# Patient Record
Sex: MALE | Race: WHITE | NOT HISPANIC OR LATINO | Employment: FULL TIME | URBAN - METROPOLITAN AREA
[De-identification: names, ages, dates, MRNs, and addresses within clinical notes are randomized per-mention and may not be internally consistent; named-entity substitution may affect disease eponyms.]

---

## 2017-03-26 ENCOUNTER — HOSPITAL ENCOUNTER (EMERGENCY)
Facility: HOSPITAL | Age: 42
Discharge: HOME | End: 2017-03-26
Attending: EMERGENCY MEDICINE | Admitting: EMERGENCY MEDICINE

## 2017-03-26 VITALS
HEIGHT: 70 IN | DIASTOLIC BLOOD PRESSURE: 76 MMHG | OXYGEN SATURATION: 96 % | BODY MASS INDEX: 26.48 KG/M2 | RESPIRATION RATE: 24 BRPM | HEART RATE: 119 BPM | WEIGHT: 185 LBS | TEMPERATURE: 97.4 F | SYSTOLIC BLOOD PRESSURE: 122 MMHG

## 2017-03-26 DIAGNOSIS — M54.2 NECK PAIN: Primary | ICD-10-CM

## 2017-03-26 DIAGNOSIS — E10.8 TYPE 1 DIABETES MELLITUS WITH COMPLICATION (HCC): ICD-10-CM

## 2017-03-26 LAB — GLUCOSE BLDC GLUCOMTR-MCNC: 305 MG/DL (ref 70–130)

## 2017-03-26 PROCEDURE — 82962 GLUCOSE BLOOD TEST: CPT

## 2017-03-26 PROCEDURE — 99283 EMERGENCY DEPT VISIT LOW MDM: CPT

## 2017-03-27 NOTE — ED PROVIDER NOTES
"Subjective   History of Present Illness  This 42-year-old gentleman presents to the emergency department with complaints of headache and left neck and upper back pain.  This apparently is chronic and ongoing issue for this gentleman with frequent exacerbations.  The patient has been seen in October of this past year with similar complaints and at that time apparently became upset regarding the plan of care and left AMA.  He is also been seen a number of times at the [facility] with a similar complaints with several AMA discharges.  The patient's most recent visit to the [facility] was on the 22nd of this month at which time he admission was proposed due to uncontrolled diabetes and some degree of ketosis.  The patient left AMA once again.  A portion of the patient's chief complaint at presentation at that time was once again back and neck pain.  The patient has been seen at [facility] several times in the last several weeks with neck and back pain as well as sinus congestion a diagnosis of sinusitis and has been started on several medications.    Precipitant for his hyperglycemic issues Y's the initiation of a prednisone Dosepak by the urgent care center when he was seen the day prior to presenting to the emergency department at .  He is also continuing on an completing a course of Augmentin for \"sinusitis\".  Regardless the patient complains primarily of left sided neck pain for the last several days initially radiating into his shoulder and left upper extremity now not doing so.  He denies acute trauma or injury and indicates that this has been an ongoing issue.  Per review of records he has undergone imaging which is demonstrated no acute disease requiring surgical entered mention he has apparently in the past had physical therapy without significant response and has been referred to pain treatment center with, at least in the past, report of difficulty in arranging an appointment.  " "The patient this evening tells me primarily that he simply feels unwell and he is having neck and back pain as well as a headache he tells me that his sugars were elevated and that \"no doctor in this state seems to be able to tell me what's wrong with me\".     Past medical history is significant for a history of diabetes he tells me that he utilizes Lantus twice daily at a 10 unit dose each time.  He is apparently not on a short acting agent.  Additionally he appears to be on Januvia as well as metformin.  Other medications are reviewed and are noted on the chart.    Social history he does not smoke he does not drink or utilize drugs    Of systems he denies fever or chills he denies nausea vomiting or diarrhea he denies cough cold or runny nose he denies sore throat he denies visual changes she denies loss of bowel or bladder control  Review of Systems   Constitutional: Negative for chills and fever.   HENT: Negative for sore throat and trouble swallowing.    Eyes: Negative for visual disturbance.   Respiratory: Negative for cough.    Gastrointestinal: Negative for abdominal pain, diarrhea, nausea and vomiting.   Genitourinary: Negative for dysuria, frequency and urgency.   Musculoskeletal: Positive for back pain.   All other systems reviewed and are negative.      Past Medical History:   Diagnosis Date   • Diabetes mellitus        No Known Allergies    History reviewed. No pertinent surgical history.    Family History   Problem Relation Age of Onset   • Diabetes Father    • Stroke Father    • Kidney failure Father        Social History     Social History   • Marital status:      Spouse name: N/A   • Number of children: N/A   • Years of education: N/A     Social History Main Topics   • Smoking status: Never Smoker   • Smokeless tobacco: None   • Alcohol use No   • Drug use: No   • Sexual activity: Defer     Other Topics Concern   • None     Social History Narrative           Objective   Physical Exam "   Constitutional: He is oriented to person, place, and time. He appears well-developed and well-nourished. No distress.   HENT:   Head: Normocephalic and atraumatic.   Mouth/Throat: Oropharynx is clear and moist.   Eyes: Pupils are equal, round, and reactive to light. No scleral icterus.   Cardiovascular: Normal rate and regular rhythm.    No murmur heard.  Pulmonary/Chest: Effort normal and breath sounds normal. No respiratory distress. He has no wheezes. He has no rales.   Abdominal: Soft. Bowel sounds are normal. He exhibits no distension. There is no tenderness.   Musculoskeletal: He exhibits no edema.   Neurological: He is alert and oriented to person, place, and time. No cranial nerve deficit. Coordination normal.   Skin: Skin is warm and dry. He is not diaphoretic.   Psychiatric: He has a normal mood and affect. His behavior is normal.   Nursing note and vitals reviewed.   the patient's speech and mentation are normal he appears in no acute distress has noted his cardiac exam shows a normal rate counted at 80 by myself on examination (an initial pulse was recorded as 119 in triage) there is mild diffuse tenderness to the left neck however range of motion is normal there is no crepitus with movement pulses are intact in the upper extremities bilaterally they are normal they are symmetric deep tendon reflexes are intact in the upper extremity bilaterally    The patient further tells me that he is apparently was told at  at some point that he required a transfusion was anemic.    At the conclusion of my examination I spoke with the patient about a proposed course of evaluation which would include blood work to confirm an elevated blood glucose as well as to confirm an anemia.  I told him that we would check for ketones as he apparently did have some previous ketosis.  I told him that we would provide him with pain medication and muscle relaxers for his neck pain as well as perform a rectal examination to check  "for occult blood (he denies melena hematochezia or hematemesis)..  At that point he expressed frustration that \"no doctor could do anything for him\".  I told him that our primary function here was not necessarily to provide in depth and detail diagnosis but rather to address more acute issues as they appeared and to attempt to make either appropriate referrals or initiate appropriate treatment as an inpatient.  I further told him that we would provide him a short acting insulin should his glucoses be elevated to that point on serum evaluation.  He replied that\" we were going to do anything\" that he would leave this place.  He asked that I prepare his paperwork and he be allowed to be to leave.  The patient is noted to be hemodynamically stable he is afebrile his exam demonstrates no evidence of any acute life-threatening illness and I will comply with the patient's request that we discharge him.  He has adamantly refused to allow me to draw blood or perform any other diagnostic evaluation.  He tells me that he is very familiar with diabetes and with ketosis and that he does not feel that that is currently a problem and once again declines any further workup.  I have told him that I will evaluate him for his anemia as well he again has declined.  He appears to be aware of his surroundings to understand the implication of declining evaluation and to have normal mental status he appears to be competent to accept or decline any proposed treatment.  It asked further that he follow-up with his physician he is currently seeing a Dr. Saenz [facility] has done so several times over the past several weeks    Procedures         ED Course  ED Course                  MDM    Final diagnoses:   Neck pain   Type 1 diabetes mellitus with complication            Reynaldo Haley MD  03/28/17 0643    "

## 2017-10-19 ENCOUNTER — OFFICE VISIT (OUTPATIENT)
Dept: NEUROSURGERY | Facility: CLINIC | Age: 42
End: 2017-10-19

## 2017-10-19 VITALS — WEIGHT: 190 LBS | HEIGHT: 69 IN | TEMPERATURE: 97 F | BODY MASS INDEX: 28.14 KG/M2

## 2017-10-19 DIAGNOSIS — M51.34 DDD (DEGENERATIVE DISC DISEASE), THORACIC: Primary | ICD-10-CM

## 2017-10-19 DIAGNOSIS — M51.36 DDD (DEGENERATIVE DISC DISEASE), LUMBAR: ICD-10-CM

## 2017-10-19 DIAGNOSIS — M50.30 DDD (DEGENERATIVE DISC DISEASE), CERVICAL: ICD-10-CM

## 2017-10-19 PROCEDURE — 99243 OFF/OP CNSLTJ NEW/EST LOW 30: CPT | Performed by: NEUROLOGICAL SURGERY

## 2017-10-19 NOTE — PROGRESS NOTES
Subjective   Patient ID: Joshua Power is a 42 y.o. male is being seen for consultation today at the request of Riky Saenz MD  Chief Complaint: Diffuse pain    History of Present Illness: The patient is a 42-year-old man who has a history of progressive pain that began in his low to mid back and has progressively involved his upper back and neck and now his head.  He has developed such a debilitating pain in his description that he has had to stop employment working at a computer because simply sitting and working is painful to him.  He had MRI of his thoracic and cervical spine last year, and again this year, and a diagnosis of DISH (disseminated idiopathic skeletal hyperostosis.  A new MRI scan of his grass neck and cervical spine were done and he was referred for neurosurgical evaluation from rheumatology.    Review of Radiographic Studies:  Cervical MRI scan shows degenerative disc change of moderate degree at C5-6.  Thoracic MRI scan shows multiple anterior osteophytes throughout out the thoracic spine without significant degenerative instability.  There is no canal stenosis, no subluxation, and no evidence of nerve root compression.    The following portions of the patient's history were reviewed, updated as appropriate and approved: allergies, current medications, past family history, past medical history, past social history, past surgical history, review of systems and problem list.  Review of Systems   Constitutional: Positive for activity change, appetite change, chills, diaphoresis, fatigue and unexpected weight change. Negative for fever.   HENT: Positive for congestion, dental problem, ear pain, facial swelling, sinus pressure, sore throat and trouble swallowing. Negative for drooling, ear discharge, hearing loss, mouth sores, nosebleeds, postnasal drip, rhinorrhea, sneezing, tinnitus and voice change.    Eyes: Positive for photophobia. Negative for pain, discharge, redness, itching and  visual disturbance.   Respiratory: Positive for apnea and shortness of breath. Negative for cough, choking, chest tightness, wheezing and stridor.    Cardiovascular: Negative for chest pain, palpitations and leg swelling.   Gastrointestinal: Negative for abdominal distention, abdominal pain, anal bleeding, blood in stool, constipation, diarrhea, nausea, rectal pain and vomiting.   Endocrine: Negative for cold intolerance, heat intolerance, polydipsia, polyphagia and polyuria.   Genitourinary: Positive for flank pain. Negative for decreased urine volume, difficulty urinating, dysuria, enuresis, frequency, genital sores, hematuria and urgency.   Musculoskeletal: Positive for arthralgias, gait problem, joint swelling, myalgias, neck pain and neck stiffness. Negative for back pain.   Skin: Negative for color change, pallor, rash and wound.   Allergic/Immunologic: Negative for environmental allergies, food allergies and immunocompromised state.   Neurological: Positive for dizziness, tremors, weakness, light-headedness, numbness and headaches. Negative for seizures, syncope, facial asymmetry and speech difficulty.   Hematological: Negative for adenopathy. Does not bruise/bleed easily.   Psychiatric/Behavioral: Positive for confusion, dysphoric mood and sleep disturbance. Negative for agitation, behavioral problems, decreased concentration, hallucinations, self-injury and suicidal ideas. The patient is not nervous/anxious and is not hyperactive.    All other systems reviewed and are negative.      Objective     NEUROLOGICAL EXAMINATION:      MENTAL STATUS:  Alert and oriented.  Speech intact.  Recent and remote memory intact.      CRANIAL NERVES:    Pupils are equal and reactive to light.  Extraocular movements are conjugate.  There is no facial asymmetry.  Hearing appears to be intact.    MUSCULOSKELETAL:  SLR negative. Jorge's test negative.    MOTOR:  Deltoid, biceps, triceps, and  strength intact.  No hand  atrophy.  Hip flexion, knee extension, ankle dorsiflexion and plantar flexion intact. No tremor, spasticity, ataxia, or dysmetria.    SENSATION: Intact to touch upper and lower extremities.  Position sense intact.    REFLEXES:  DTR Intact and symmetrical in upper and lower extremities. Negative Babinski bilaterally    Assessment   Disseminated spinal pain.  A possible diagnosis of disseminated idiopathic skeletal hyperostosis based on anterior osteophytes in the thoracic region is a consideration, but it does not have the appearance of a full-blown syndrome.  I cannot specifically diagnose the cause of his pain.       Plan   Recommend pain management since there is no neurosurgical treatment for this spinal problem.  He says he had a referral may but it was denied by his insurance carrier.  He became quite agitated and left suddenly.       Riky Fraser MD

## 2019-03-17 ENCOUNTER — APPOINTMENT (OUTPATIENT)
Dept: CT IMAGING | Facility: HOSPITAL | Age: 44
End: 2019-03-17

## 2019-03-17 ENCOUNTER — HOSPITAL ENCOUNTER (INPATIENT)
Facility: HOSPITAL | Age: 44
LOS: 2 days | Discharge: HOME | End: 2019-03-20
Attending: EMERGENCY MEDICINE | Admitting: HOSPITALIST

## 2019-03-17 DIAGNOSIS — K57.80 PERFORATED DIVERTICULUM: Primary | ICD-10-CM

## 2019-03-17 LAB
ALBUMIN SERPL-MCNC: 5.04 G/DL (ref 3.2–4.8)
ALBUMIN/GLOB SERPL: 1.8 G/DL (ref 1.5–2.5)
ALP SERPL-CCNC: 47 U/L (ref 25–100)
ALT SERPL W P-5'-P-CCNC: 53 U/L (ref 7–40)
ANION GAP SERPL CALCULATED.3IONS-SCNC: 11 MMOL/L (ref 3–11)
AST SERPL-CCNC: 137 U/L (ref 0–33)
BASOPHILS # BLD AUTO: 0.01 10*3/MM3 (ref 0–0.2)
BASOPHILS NFR BLD AUTO: 0.1 % (ref 0–1)
BILIRUB SERPL-MCNC: 0.5 MG/DL (ref 0.3–1.2)
BUN BLD-MCNC: 15 MG/DL (ref 9–23)
BUN/CREAT SERPL: 13.3 (ref 7–25)
CALCIUM SPEC-SCNC: 8.8 MG/DL (ref 8.7–10.4)
CHLORIDE SERPL-SCNC: 98 MMOL/L (ref 99–109)
CO2 SERPL-SCNC: 23 MMOL/L (ref 20–31)
CREAT BLD-MCNC: 1.13 MG/DL (ref 0.6–1.3)
DEPRECATED RDW RBC AUTO: 47.7 FL (ref 37–54)
EOSINOPHIL # BLD AUTO: 0.05 10*3/MM3 (ref 0–0.3)
EOSINOPHIL NFR BLD AUTO: 0.4 % (ref 0–3)
ERYTHROCYTE [DISTWIDTH] IN BLOOD BY AUTOMATED COUNT: 13.8 % (ref 11.3–14.5)
GFR SERPL CREATININE-BSD FRML MDRD: 70 ML/MIN/1.73
GLOBULIN UR ELPH-MCNC: 2.8 GM/DL
GLUCOSE BLD-MCNC: 253 MG/DL (ref 70–100)
HCT VFR BLD AUTO: 44.6 % (ref 38.9–50.9)
HGB BLD-MCNC: 15.6 G/DL (ref 13.1–17.5)
IMM GRANULOCYTES # BLD AUTO: 0.08 10*3/MM3 (ref 0–0.05)
IMM GRANULOCYTES NFR BLD AUTO: 0.6 % (ref 0–0.6)
LIPASE SERPL-CCNC: 63 U/L (ref 6–51)
LYMPHOCYTES # BLD AUTO: 3.1 10*3/MM3 (ref 0.6–4.8)
LYMPHOCYTES NFR BLD AUTO: 22 % (ref 24–44)
MCH RBC QN AUTO: 33.2 PG (ref 27–31)
MCHC RBC AUTO-ENTMCNC: 35 G/DL (ref 32–36)
MCV RBC AUTO: 94.9 FL (ref 80–99)
MONOCYTES # BLD AUTO: 0.77 10*3/MM3 (ref 0–1)
MONOCYTES NFR BLD AUTO: 5.5 % (ref 0–12)
NEUTROPHILS # BLD AUTO: 10.16 10*3/MM3 (ref 1.5–8.3)
NEUTROPHILS NFR BLD AUTO: 72 % (ref 41–71)
PLATELET # BLD AUTO: 179 10*3/MM3 (ref 150–450)
PMV BLD AUTO: 10.8 FL (ref 6–12)
POTASSIUM BLD-SCNC: 5.5 MMOL/L (ref 3.5–5.5)
PROT SERPL-MCNC: 7.8 G/DL (ref 5.7–8.2)
RBC # BLD AUTO: 4.7 10*6/MM3 (ref 4.2–5.76)
SODIUM BLD-SCNC: 132 MMOL/L (ref 132–146)
WBC NRBC COR # BLD: 14.09 10*3/MM3 (ref 3.5–10.8)

## 2019-03-17 PROCEDURE — 80053 COMPREHEN METABOLIC PANEL: CPT | Performed by: NURSE PRACTITIONER

## 2019-03-17 PROCEDURE — 87040 BLOOD CULTURE FOR BACTERIA: CPT | Performed by: NURSE PRACTITIONER

## 2019-03-17 PROCEDURE — 25010000002 ONDANSETRON PER 1 MG: Performed by: NURSE PRACTITIONER

## 2019-03-17 PROCEDURE — 83605 ASSAY OF LACTIC ACID: CPT | Performed by: NURSE PRACTITIONER

## 2019-03-17 PROCEDURE — 83690 ASSAY OF LIPASE: CPT | Performed by: NURSE PRACTITIONER

## 2019-03-17 PROCEDURE — 81003 URINALYSIS AUTO W/O SCOPE: CPT | Performed by: NURSE PRACTITIONER

## 2019-03-17 PROCEDURE — 74177 CT ABD & PELVIS W/CONTRAST: CPT

## 2019-03-17 PROCEDURE — 85025 COMPLETE CBC W/AUTO DIFF WBC: CPT | Performed by: NURSE PRACTITIONER

## 2019-03-17 PROCEDURE — 25010000002 MORPHINE PER 10 MG: Performed by: EMERGENCY MEDICINE

## 2019-03-17 PROCEDURE — 25010000002 HYDROMORPHONE 1 MG/ML SOLUTION: Performed by: EMERGENCY MEDICINE

## 2019-03-17 PROCEDURE — 36415 COLL VENOUS BLD VENIPUNCTURE: CPT

## 2019-03-17 PROCEDURE — 84145 PROCALCITONIN (PCT): CPT | Performed by: NURSE PRACTITIONER

## 2019-03-17 PROCEDURE — 25010000002 IOPAMIDOL 61 % SOLUTION: Performed by: EMERGENCY MEDICINE

## 2019-03-17 PROCEDURE — 99285 EMERGENCY DEPT VISIT HI MDM: CPT

## 2019-03-17 RX ORDER — ONDANSETRON 2 MG/ML
4 INJECTION INTRAMUSCULAR; INTRAVENOUS ONCE
Status: COMPLETED | OUTPATIENT
Start: 2019-03-17 | End: 2019-03-17

## 2019-03-17 RX ORDER — TRAZODONE HYDROCHLORIDE 150 MG/1
50 TABLET ORAL NIGHTLY
COMMUNITY
End: 2022-03-01 | Stop reason: ALTCHOICE

## 2019-03-17 RX ORDER — PROPRANOLOL HYDROCHLORIDE 10 MG/1
10 TABLET ORAL DAILY
COMMUNITY
End: 2022-03-01

## 2019-03-17 RX ORDER — CLONAZEPAM 0.5 MG/1
0.5 TABLET ORAL
COMMUNITY

## 2019-03-17 RX ORDER — MORPHINE SULFATE 4 MG/ML
4 INJECTION, SOLUTION INTRAMUSCULAR; INTRAVENOUS ONCE
Status: COMPLETED | OUTPATIENT
Start: 2019-03-17 | End: 2019-03-17

## 2019-03-17 RX ORDER — SODIUM CHLORIDE 0.9 % (FLUSH) 0.9 %
10 SYRINGE (ML) INJECTION AS NEEDED
Status: DISCONTINUED | OUTPATIENT
Start: 2019-03-17 | End: 2019-03-20 | Stop reason: HOSPADM

## 2019-03-17 RX ADMIN — HYDROMORPHONE HYDROCHLORIDE 1 MG: 1 INJECTION, SOLUTION INTRAMUSCULAR; INTRAVENOUS; SUBCUTANEOUS at 23:22

## 2019-03-17 RX ADMIN — ONDANSETRON 4 MG: 2 INJECTION INTRAMUSCULAR; INTRAVENOUS at 20:47

## 2019-03-17 RX ADMIN — IOPAMIDOL 100 ML: 612 INJECTION, SOLUTION INTRAVENOUS at 21:51

## 2019-03-17 RX ADMIN — MORPHINE SULFATE 4 MG: 4 INJECTION INTRAVENOUS at 20:57

## 2019-03-17 RX ADMIN — SODIUM CHLORIDE 1000 ML: 9 INJECTION, SOLUTION INTRAVENOUS at 20:47

## 2019-03-18 PROBLEM — E86.0 DEHYDRATION: Status: ACTIVE | Noted: 2019-03-18

## 2019-03-18 PROBLEM — F32.A DEPRESSION: Status: ACTIVE | Noted: 2019-03-18

## 2019-03-18 PROBLEM — I10 HTN (HYPERTENSION): Status: ACTIVE | Noted: 2019-03-18

## 2019-03-18 PROBLEM — K57.80 PERFORATED DIVERTICULUM: Status: ACTIVE | Noted: 2019-03-18

## 2019-03-18 PROBLEM — D64.9 ANEMIA: Status: ACTIVE | Noted: 2019-03-18

## 2019-03-18 PROBLEM — E11.9 TYPE 2 DIABETES MELLITUS: Status: ACTIVE | Noted: 2019-03-18

## 2019-03-18 PROBLEM — E87.20 LACTIC ACIDOSIS: Status: ACTIVE | Noted: 2019-03-18

## 2019-03-18 PROBLEM — G47.33 OSA (OBSTRUCTIVE SLEEP APNEA): Status: ACTIVE | Noted: 2019-03-18

## 2019-03-18 PROBLEM — G89.4 CHRONIC PAIN SYNDROME: Status: ACTIVE | Noted: 2019-03-18

## 2019-03-18 PROBLEM — K57.20 DIVERTICULITIS OF COLON WITH PERFORATION: Status: ACTIVE | Noted: 2019-03-18

## 2019-03-18 LAB
ANION GAP SERPL CALCULATED.3IONS-SCNC: 9 MMOL/L (ref 3–11)
BASOPHILS # BLD AUTO: 0.01 10*3/MM3 (ref 0–0.2)
BASOPHILS NFR BLD AUTO: 0.1 % (ref 0–1)
BILIRUB UR QL STRIP: NEGATIVE
BUN BLD-MCNC: 14 MG/DL (ref 9–23)
BUN/CREAT SERPL: 14.4 (ref 7–25)
CALCIUM SPEC-SCNC: 8.1 MG/DL (ref 8.7–10.4)
CHLORIDE SERPL-SCNC: 104 MMOL/L (ref 99–109)
CLARITY UR: CLEAR
CO2 SERPL-SCNC: 26 MMOL/L (ref 20–31)
COLOR UR: YELLOW
CREAT BLD-MCNC: 0.97 MG/DL (ref 0.6–1.3)
D-LACTATE SERPL-SCNC: 1.5 MMOL/L (ref 0.5–2)
D-LACTATE SERPL-SCNC: 3.2 MMOL/L (ref 0.5–2)
DEPRECATED RDW RBC AUTO: 48.7 FL (ref 37–54)
EOSINOPHIL # BLD AUTO: 0.03 10*3/MM3 (ref 0–0.3)
EOSINOPHIL NFR BLD AUTO: 0.2 % (ref 0–3)
ERYTHROCYTE [DISTWIDTH] IN BLOOD BY AUTOMATED COUNT: 13.9 % (ref 11.3–14.5)
GFR SERPL CREATININE-BSD FRML MDRD: 84 ML/MIN/1.73
GLUCOSE BLD-MCNC: 153 MG/DL (ref 70–100)
GLUCOSE BLDC GLUCOMTR-MCNC: 101 MG/DL (ref 70–130)
GLUCOSE BLDC GLUCOMTR-MCNC: 114 MG/DL (ref 70–130)
GLUCOSE BLDC GLUCOMTR-MCNC: 187 MG/DL (ref 70–130)
GLUCOSE BLDC GLUCOMTR-MCNC: 200 MG/DL (ref 70–130)
GLUCOSE UR STRIP-MCNC: ABNORMAL MG/DL
HBA1C MFR BLD: 8.8 % (ref 4.8–5.6)
HCT VFR BLD AUTO: 37.9 % (ref 38.9–50.9)
HGB BLD-MCNC: 12.8 G/DL (ref 13.1–17.5)
HGB UR QL STRIP.AUTO: NEGATIVE
HOLD SPECIMEN: NORMAL
IMM GRANULOCYTES # BLD AUTO: 0.06 10*3/MM3 (ref 0–0.05)
IMM GRANULOCYTES NFR BLD AUTO: 0.4 % (ref 0–0.6)
KETONES UR QL STRIP: NEGATIVE
LEUKOCYTE ESTERASE UR QL STRIP.AUTO: NEGATIVE
LYMPHOCYTES # BLD AUTO: 2.58 10*3/MM3 (ref 0.6–4.8)
LYMPHOCYTES NFR BLD AUTO: 16.8 % (ref 24–44)
MCH RBC QN AUTO: 32.5 PG (ref 27–31)
MCHC RBC AUTO-ENTMCNC: 33.8 G/DL (ref 32–36)
MCV RBC AUTO: 96.2 FL (ref 80–99)
MONOCYTES # BLD AUTO: 1.14 10*3/MM3 (ref 0–1)
MONOCYTES NFR BLD AUTO: 7.4 % (ref 0–12)
NEUTROPHILS # BLD AUTO: 11.58 10*3/MM3 (ref 1.5–8.3)
NEUTROPHILS NFR BLD AUTO: 75.5 % (ref 41–71)
NITRITE UR QL STRIP: NEGATIVE
PH UR STRIP.AUTO: 6.5 [PH] (ref 5–8)
PLATELET # BLD AUTO: 130 10*3/MM3 (ref 150–450)
PMV BLD AUTO: 10.2 FL (ref 6–12)
POTASSIUM BLD-SCNC: 3.7 MMOL/L (ref 3.5–5.5)
PROCALCITONIN SERPL-MCNC: <0.05 NG/ML
PROT UR QL STRIP: NEGATIVE
RBC # BLD AUTO: 3.94 10*6/MM3 (ref 4.2–5.76)
SODIUM BLD-SCNC: 139 MMOL/L (ref 132–146)
SP GR UR STRIP: 1.03 (ref 1–1.03)
UROBILINOGEN UR QL STRIP: ABNORMAL
WBC NRBC COR # BLD: 15.34 10*3/MM3 (ref 3.5–10.8)

## 2019-03-18 PROCEDURE — 80048 BASIC METABOLIC PNL TOTAL CA: CPT | Performed by: HOSPITALIST

## 2019-03-18 PROCEDURE — 25010000002 HYDROMORPHONE 1 MG/ML SOLUTION: Performed by: INTERNAL MEDICINE

## 2019-03-18 PROCEDURE — 94799 UNLISTED PULMONARY SVC/PX: CPT

## 2019-03-18 PROCEDURE — 99223 1ST HOSP IP/OBS HIGH 75: CPT | Performed by: HOSPITALIST

## 2019-03-18 PROCEDURE — 63710000001 INSULIN LISPRO (HUMAN) PER 5 UNITS: Performed by: PHYSICIAN ASSISTANT

## 2019-03-18 PROCEDURE — C1894 INTRO/SHEATH, NON-LASER: HCPCS

## 2019-03-18 PROCEDURE — 25010000002 HYDROMORPHONE PER 4 MG: Performed by: HOSPITALIST

## 2019-03-18 PROCEDURE — 25010000002 PIPERACILLIN SOD-TAZOBACTAM PER 1 G: Performed by: HOSPITALIST

## 2019-03-18 PROCEDURE — 25010000002 PIPERACILLIN SOD-TAZOBACTAM PER 1 G: Performed by: NURSE PRACTITIONER

## 2019-03-18 PROCEDURE — 25010000002 LORAZEPAM PER 2 MG: Performed by: HOSPITALIST

## 2019-03-18 PROCEDURE — 02HV33Z INSERTION OF INFUSION DEVICE INTO SUPERIOR VENA CAVA, PERCUTANEOUS APPROACH: ICD-10-PCS | Performed by: HOSPITALIST

## 2019-03-18 PROCEDURE — 63710000001 INSULIN REGULAR HUMAN PER 5 UNITS: Performed by: NURSE PRACTITIONER

## 2019-03-18 PROCEDURE — 82962 GLUCOSE BLOOD TEST: CPT

## 2019-03-18 PROCEDURE — 63710000001 INSULIN DETEMIR PER 5 UNITS: Performed by: HOSPITALIST

## 2019-03-18 PROCEDURE — 94660 CPAP INITIATION&MGMT: CPT

## 2019-03-18 PROCEDURE — 85025 COMPLETE CBC W/AUTO DIFF WBC: CPT | Performed by: HOSPITALIST

## 2019-03-18 PROCEDURE — 83605 ASSAY OF LACTIC ACID: CPT | Performed by: NURSE PRACTITIONER

## 2019-03-18 PROCEDURE — 83036 HEMOGLOBIN GLYCOSYLATED A1C: CPT | Performed by: HOSPITALIST

## 2019-03-18 PROCEDURE — C1751 CATH, INF, PER/CENT/MIDLINE: HCPCS

## 2019-03-18 RX ORDER — LORAZEPAM 2 MG/ML
0.5 INJECTION INTRAMUSCULAR EVERY 12 HOURS PRN
Status: DISCONTINUED | OUTPATIENT
Start: 2019-03-18 | End: 2019-03-20 | Stop reason: HOSPADM

## 2019-03-18 RX ORDER — HYDROCODONE BITARTRATE AND ACETAMINOPHEN 5; 325 MG/1; MG/1
1 TABLET ORAL EVERY 6 HOURS PRN
Status: DISCONTINUED | OUTPATIENT
Start: 2019-03-18 | End: 2019-03-19

## 2019-03-18 RX ORDER — DULOXETIN HYDROCHLORIDE 60 MG/1
60 CAPSULE, DELAYED RELEASE ORAL 2 TIMES DAILY
Status: DISCONTINUED | OUTPATIENT
Start: 2019-03-18 | End: 2019-03-20 | Stop reason: HOSPADM

## 2019-03-18 RX ORDER — SODIUM CHLORIDE 9 MG/ML
125 INJECTION, SOLUTION INTRAVENOUS CONTINUOUS
Status: DISCONTINUED | OUTPATIENT
Start: 2019-03-18 | End: 2019-03-20 | Stop reason: HOSPADM

## 2019-03-18 RX ORDER — DIVALPROEX SODIUM 500 MG/1
500 TABLET, DELAYED RELEASE ORAL EVERY 12 HOURS SCHEDULED
Status: CANCELLED | OUTPATIENT
Start: 2019-03-17

## 2019-03-18 RX ORDER — PROPRANOLOL HYDROCHLORIDE 10 MG/1
10 TABLET ORAL DAILY
Status: DISCONTINUED | OUTPATIENT
Start: 2019-03-19 | End: 2019-03-20 | Stop reason: HOSPADM

## 2019-03-18 RX ORDER — FERROUS SULFATE 325(65) MG
325 TABLET ORAL
Status: CANCELLED | OUTPATIENT
Start: 2019-03-18

## 2019-03-18 RX ORDER — SODIUM CHLORIDE 0.9 % (FLUSH) 0.9 %
20 SYRINGE (ML) INJECTION AS NEEDED
Status: DISCONTINUED | OUTPATIENT
Start: 2019-03-18 | End: 2019-03-20 | Stop reason: HOSPADM

## 2019-03-18 RX ORDER — NICOTINE POLACRILEX 4 MG
15 LOZENGE BUCCAL
Status: DISCONTINUED | OUTPATIENT
Start: 2019-03-18 | End: 2019-03-20 | Stop reason: HOSPADM

## 2019-03-18 RX ORDER — PROPRANOLOL HYDROCHLORIDE 10 MG/1
10 TABLET ORAL DAILY
Status: CANCELLED | OUTPATIENT
Start: 2019-03-18

## 2019-03-18 RX ORDER — TRAZODONE HYDROCHLORIDE 50 MG/1
75 TABLET ORAL NIGHTLY
Status: DISCONTINUED | OUTPATIENT
Start: 2019-03-18 | End: 2019-03-19

## 2019-03-18 RX ORDER — SODIUM CHLORIDE 0.9 % (FLUSH) 0.9 %
10 SYRINGE (ML) INJECTION EVERY 12 HOURS SCHEDULED
Status: DISCONTINUED | OUTPATIENT
Start: 2019-03-18 | End: 2019-03-20 | Stop reason: HOSPADM

## 2019-03-18 RX ORDER — SODIUM CHLORIDE 0.9 % (FLUSH) 0.9 %
10 SYRINGE (ML) INJECTION AS NEEDED
Status: DISCONTINUED | OUTPATIENT
Start: 2019-03-18 | End: 2019-03-20 | Stop reason: HOSPADM

## 2019-03-18 RX ORDER — CLONAZEPAM 0.5 MG/1
0.5 TABLET ORAL 2 TIMES DAILY
Status: CANCELLED | OUTPATIENT
Start: 2019-03-17

## 2019-03-18 RX ORDER — HYDROMORPHONE HYDROCHLORIDE 1 MG/ML
0.5 INJECTION, SOLUTION INTRAMUSCULAR; INTRAVENOUS; SUBCUTANEOUS
Status: DISCONTINUED | OUTPATIENT
Start: 2019-03-18 | End: 2019-03-18

## 2019-03-18 RX ORDER — CLONAZEPAM 0.5 MG/1
0.5 TABLET ORAL 2 TIMES DAILY
Status: DISCONTINUED | OUTPATIENT
Start: 2019-03-18 | End: 2019-03-20 | Stop reason: HOSPADM

## 2019-03-18 RX ORDER — DEXTROSE MONOHYDRATE 25 G/50ML
25 INJECTION, SOLUTION INTRAVENOUS
Status: DISCONTINUED | OUTPATIENT
Start: 2019-03-18 | End: 2019-03-20 | Stop reason: HOSPADM

## 2019-03-18 RX ORDER — DIVALPROEX SODIUM 500 MG/1
500 TABLET, DELAYED RELEASE ORAL EVERY 12 HOURS SCHEDULED
Status: DISCONTINUED | OUTPATIENT
Start: 2019-03-18 | End: 2019-03-20 | Stop reason: HOSPADM

## 2019-03-18 RX ORDER — DULOXETIN HYDROCHLORIDE 60 MG/1
60 CAPSULE, DELAYED RELEASE ORAL 2 TIMES DAILY
Status: CANCELLED | OUTPATIENT
Start: 2019-03-17

## 2019-03-18 RX ORDER — FAMOTIDINE 20 MG/1
20 TABLET, FILM COATED ORAL 2 TIMES DAILY
Status: DISCONTINUED | OUTPATIENT
Start: 2019-03-18 | End: 2019-03-20 | Stop reason: HOSPADM

## 2019-03-18 RX ADMIN — TAZOBACTAM SODIUM AND PIPERACILLIN SODIUM 3.38 G: 375; 3 INJECTION, SOLUTION INTRAVENOUS at 18:18

## 2019-03-18 RX ADMIN — LORAZEPAM 0.5 MG: 2 INJECTION INTRAMUSCULAR; INTRAVENOUS at 03:28

## 2019-03-18 RX ADMIN — SODIUM CHLORIDE 125 ML/HR: 9 INJECTION, SOLUTION INTRAVENOUS at 12:35

## 2019-03-18 RX ADMIN — LORAZEPAM 0.5 MG: 2 INJECTION INTRAMUSCULAR; INTRAVENOUS at 17:32

## 2019-03-18 RX ADMIN — FAMOTIDINE 20 MG: 20 TABLET ORAL at 20:32

## 2019-03-18 RX ADMIN — HYDROCODONE BITARTRATE AND ACETAMINOPHEN 1 TABLET: 5; 325 TABLET ORAL at 22:40

## 2019-03-18 RX ADMIN — HYDROMORPHONE HYDROCHLORIDE 0.5 MG: 1 INJECTION, SOLUTION INTRAMUSCULAR; INTRAVENOUS; SUBCUTANEOUS at 12:46

## 2019-03-18 RX ADMIN — TAZOBACTAM SODIUM AND PIPERACILLIN SODIUM 3.38 G: 375; 3 INJECTION, SOLUTION INTRAVENOUS at 12:02

## 2019-03-18 RX ADMIN — HYDROMORPHONE HYDROCHLORIDE 0.5 MG: 1 INJECTION, SOLUTION INTRAMUSCULAR; INTRAVENOUS; SUBCUTANEOUS at 09:03

## 2019-03-18 RX ADMIN — TAZOBACTAM SODIUM AND PIPERACILLIN SODIUM 4.5 G: 500; 4 INJECTION, SOLUTION INTRAVENOUS at 00:02

## 2019-03-18 RX ADMIN — DIVALPROEX SODIUM 500 MG: 500 TABLET, DELAYED RELEASE ORAL at 20:33

## 2019-03-18 RX ADMIN — HYDROMORPHONE HYDROCHLORIDE 0.5 MG: 1 INJECTION, SOLUTION INTRAMUSCULAR; INTRAVENOUS; SUBCUTANEOUS at 05:50

## 2019-03-18 RX ADMIN — HYDROMORPHONE HYDROCHLORIDE 1 MG: 1 INJECTION, SOLUTION INTRAMUSCULAR; INTRAVENOUS; SUBCUTANEOUS at 14:21

## 2019-03-18 RX ADMIN — METHYLNALTREXONE BROMIDE 6 MG: 12 INJECTION, SOLUTION SUBCUTANEOUS at 16:52

## 2019-03-18 RX ADMIN — CLONAZEPAM 0.5 MG: 0.5 TABLET ORAL at 20:32

## 2019-03-18 RX ADMIN — TAZOBACTAM SODIUM AND PIPERACILLIN SODIUM 3.38 G: 375; 3 INJECTION, SOLUTION INTRAVENOUS at 05:29

## 2019-03-18 RX ADMIN — TAZOBACTAM SODIUM AND PIPERACILLIN SODIUM 3.38 G: 375; 3 INJECTION, SOLUTION INTRAVENOUS at 23:48

## 2019-03-18 RX ADMIN — INSULIN DETEMIR 15 UNITS: 100 INJECTION, SOLUTION SUBCUTANEOUS at 01:33

## 2019-03-18 RX ADMIN — SODIUM CHLORIDE 1000 ML: 9 INJECTION, SOLUTION INTRAVENOUS at 01:35

## 2019-03-18 RX ADMIN — SODIUM CHLORIDE 1000 ML: 9 INJECTION, SOLUTION INTRAVENOUS at 15:45

## 2019-03-18 RX ADMIN — HYDROMORPHONE HYDROCHLORIDE 1 MG: 1 INJECTION, SOLUTION INTRAMUSCULAR; INTRAVENOUS; SUBCUTANEOUS at 16:52

## 2019-03-18 RX ADMIN — SODIUM CHLORIDE 125 ML/HR: 9 INJECTION, SOLUTION INTRAVENOUS at 01:35

## 2019-03-18 RX ADMIN — INSULIN LISPRO 2 UNITS: 100 INJECTION, SOLUTION INTRAVENOUS; SUBCUTANEOUS at 08:50

## 2019-03-18 RX ADMIN — DULOXETINE HYDROCHLORIDE 60 MG: 60 CAPSULE, DELAYED RELEASE ORAL at 20:33

## 2019-03-18 RX ADMIN — INSULIN HUMAN 2 UNITS: 100 INJECTION, SOLUTION PARENTERAL at 23:48

## 2019-03-18 RX ADMIN — TRAZODONE HYDROCHLORIDE 75 MG: 50 TABLET ORAL at 20:33

## 2019-03-18 RX ADMIN — INSULIN LISPRO 3 UNITS: 100 INJECTION, SOLUTION INTRAVENOUS; SUBCUTANEOUS at 01:43

## 2019-03-18 NOTE — H&P
"    [facility] Medicine Services  HISTORY AND PHYSICAL    Patient Name: Joshua Poewr  : 1975  MRN: [id number]  Primary Care Physician: Riky Saenz MD  Date of admission: 3/17/2019      Subjective   Subjective     Chief Complaint:  Abd pain    HPI:  Joshua Power is a 44 y.o. male with history of CONSTANTIN, HTN, depression, uncontrolled IDDM2, and chronic pain syndrome, who presented to the ED with sudden onset of sharp bilat pelvic pain, that radiated to the groin, which started about 1 hour prior to presenting to the ED. Pt denies any prior abd pain or fever/chills prior to today, but while he was in the ED, he reported some hot and cold flashes. No N/V/D/C. No aggravating or relieving factors. No hematochezia or melena. No dysuria. Currently denies any other symptoms, except extreme thirst, but he also reports chronic polydipsia/polyuria.     Of note, pt reported that he's had 1 prior episode of diverticulitis in 2018, which he was treated at . He stated that there were no perforation, but \"doctors told me my colon was very angry and that I needed to have surgery, but the next day, came back and said we'll try some liquid first.\" Pt was sent home after about 3 days with abx. He did not have a f/u colonoscopy after his last bout of diverticulitis, but stated that he did have a colonoscopy about 5 years ago in [place], but he's not sure why, poss due to h/o anemia.      W/U in the ED c/w acute diverticulitis with perforation. Dr Dumont from gen surgery consulted by ED provided. Pt will be admitted for further care under hospitalists' service.      Review of Systems   Otherwise complete ROS reviewed and is negative except as mentioned in the HPI.    Personal History     Past Medical History:   Diagnosis Date   • Anemia    • Arthritis    • Diabetes mellitus (CMS/HCC)    • DISH (diffuse idiopathic skeletal hyperostosis)    • Headache    • Hypertension    • Injury of back  "       History reviewed. No pertinent surgical history.    Family History: family history includes Diabetes in his father; Kidney failure in his father; Stroke in his father. Otherwise pertinent FHx was reviewed and unremarkable.     Social History:  reports that  has never smoked. he has never used smokeless tobacco. He reports that he does not drink alcohol or use drugs.  Social History     Social History Narrative   • Not on file       Medications:    Available home medication information reviewed.      Allergies   Allergen Reactions   • Nsaids GI Bleeding       Objective   Objective     Vital Signs:   Temp:  [97.4 °F (36.3 °C)-99 °F (37.2 °C)] 99 °F (37.2 °C)  Heart Rate:  [] 105  Resp:  [16-18] 18  BP: (122-143)/(79-99) 140/80        Physical Exam   General Assessment: No acute cardiopulmonary distress. Well developed and well nourished.    HEENT: NCAT, PERRL, lips/MM very dry    Neck: Supple    CVS: RRR, S1S2 normal, no murmurs    Resp: CTAB, no adventitious sound    Abd: soft, tender over bilat lower quadrants, mildly distended, normal BS, + voluntary guarding, but peritoneal signs    Ext: No edema, both calves are symmetric and NTTP    Neuro: Face symmetric, speech clear, ROMERO    Skin: W/D/I. No rash.    Psych: Affect is appropriate        Results Reviewed:  I have personally reviewed current lab, radiology, and data and agree.    Results from last 7 days   Lab Units 03/17/19  2042   WBC 10*3/mm3 14.09*   HEMOGLOBIN g/dL 15.6   HEMATOCRIT % 44.6   PLATELETS 10*3/mm3 179     Results from last 7 days   Lab Units 03/17/19  2042   SODIUM mmol/L 132   POTASSIUM mmol/L 5.5   CHLORIDE mmol/L 98*   CO2 mmol/L 23.0   BUN mg/dL 15   CREATININE mg/dL 1.13   GLUCOSE mg/dL 253*   CALCIUM mg/dL 8.8   ALT (SGPT) U/L 53*   AST (SGOT) U/L 137*     Estimated Creatinine Clearance: 93.1 mL/min (by C-G formula based on SCr of 1.13 mg/dL).  Brief Urine Lab Results  (Last result in the past 365 days)      Color   Clarity    Blood   Leuk Est   Nitrite   Protein   CREAT   Urine HCG        03/17/19 2338 Yellow Clear Negative Negative Negative Negative             No results found for: BNP  Imaging Results (last 24 hours)     Procedure Component Value Units Date/Time    CT Abdomen Pelvis With Contrast [[id number]] Collected:  03/17/19 2144     Updated:  03/17/19 2242    Narrative:       EXAM:    CT Abdomen and Pelvis With Contrast     EXAM DATE/TIME:    3/17/2019 9:44 PM     CLINICAL HISTORY:    44 years old, male; Pain; Abdominal pain; Localized; Lower; Patient HX: Low   abd pain, diarrhea     TECHNIQUE:    Axial computed tomography images of the abdomen and pelvis with intravenous   contrast.    All CT scans at this facility use at least one of these dose optimization   techniques: automated exposure control; mA and/or kV adjustment per patient   size (includes targeted exams where dose is matched to clinical indication); or   iterative reconstruction.    Coronal reformatted images were created and reviewed.     CONTRAST:    Contrast Material: 100 ml of iso 300; Contrast Route: existing     COMPARISON:    No relevant prior studies available.     FINDINGS:    Lower thorax: No acute findings.     ABDOMEN:    Liver: Normal. No mass.    Gallbladder and bile ducts: Normal. No calcified stones. No ductal dilation.    Pancreas: Normal. No ductal dilation.    Spleen: Normal. No splenomegaly.    Adrenals: Normal. No mass.    Kidneys and ureters: Normal. No hydronephrosis.    Stomach and bowel:  Colonic diverticulosis. Segment of wall thickening at the   junction of the sigmoid and descending colon, consistent in appearance with   acute diverticulitis. There is surrounding fat stranding. There are adjacent   small locules of extraluminal air.    Appendix: A normal appendix is identified.    PELVIS:    Bladder: Unremarkable as visualized.    Reproductive: Unremarkable as visualized.     ABDOMEN and PELVIS:    Intraperitoneal space: See above. No  abscess.   Bones/joints: No acute fracture. No dislocation.    Soft tissues: Unremarkable.    Vasculature: Normal. No abdominal aortic aneurysm.    Lymph nodes: Normal. No enlarged lymph nodes.       Impression:       Acute perforated diverticulitis at the junction of the sigmoid and descending   colon. No abscess.    THIS DOCUMENT HAS BEEN ELECTRONICALLY SIGNED BY JOSE G TREVIZO MD             Assessment/Plan   Assessment / Plan     Active Hospital Problems    Diagnosis Date Noted   • Diverticulitis of colon with perforation [K57.20] 03/18/2019   • HTN (hypertension) [I10] 03/18/2019   • Type 2 diabetes mellitus (CMS/HCC) [E11.9] 03/18/2019   • Anemia [D64.9] 03/18/2019   • Perforated diverticulum [K57.80] 03/18/2019   • CONSTANTIN (obstructive sleep apnea) [G47.33] 03/18/2019   • Depression [F32.9] 03/18/2019   • Chronic pain syndrome [G89.4] 03/18/2019   • Dehydration [E86.0] 03/18/2019   • Lactic acidosis [E87.2] 03/18/2019     Joshua Power is a 44 y.o. male with history of CONSTANTIN, HTN, depression, IDDM2, and chronic pain syndrome, admitted with complicated recurrent diverticulitis-first episode 7 months ago, treated at [facility].    - Hemodynamically stable and nontoxic  - Lactic acid is 3.2 and pt only received 1L NS in the ED. Still appears very dry, so will give anoth 1L NS bolus.  - Surgical consultation obtained by ED provider, awaiting formal eval  - Cont with maintenance IVF after bolus, pain control/supportive measures, and anticipatory care  - Strict I/O  - Holding home meds and sub with IV equivalent when possible  - Insulin for DM2. Pt normally takes Humalog 26 units BID. No A1C on file. Curently hyperglycemic, could be reactive. I'll give 15 units of basal insulin x 1 only since pt will be NPO and add low dose SSI. Accu check q6h while NPO. Check A1C in am.  - CPAP at night      DVT prophylaxis: Mechanical only due to poss need for surgery    CODE STATUS:    Code Status and Medical Interventions:   Ordered at:  03/18/19 0001     Code Status:    CPR     Medical Interventions (Level of Support Prior to Arrest):    Full       Admission Status:  I believe this patient meets INPATIENT status due to the need for care which can only be reasonably provided in an hospital setting such as possible need for aggressive/expedited ancillary services and/or consultation services, IV medications, close physician monitoring, and/or procedures.  In such, I feel patient’s risk for adverse outcomes and need for care warrant INPATIENT evaluation and predict the patient’s care encounter to likely last beyond 2 midnights.    Electronically signed by Bonny Putnam MD, 03/18/19, 12:08 AM.

## 2019-03-18 NOTE — CONSULTS
General Surgery Consultation Note    Date of Service: 3/18/2019  Joshua Power  [id number]  1975      Referring Provider: Vivi Humphreys MD    Location of Consult: Hospital floor     Reason for Consultation: Diverticulitis       History of Present Illness:  I am seeing, Joshua Power, in consultation for Vivi Humphreys MD regarding diverticulitis.  44-year-old gentleman presents with approximately 24 hours worth of crampy abdominal pain which is located in the left lower quadrant.  He reports chills without fever, and nausea without significant vomiting.  He had an episode of diverticulitis approximately 1 year ago for which she was seen at the [facility].  He is feeling somewhat better.    Problem List Items Addressed This Visit        Digestive    Perforated diverticulum - Primary          Past Medical History:   Diagnosis Date   • Anemia    • Arthritis    • Diabetes mellitus (CMS/HCC)    • DISH (diffuse idiopathic skeletal hyperostosis)    • Headache    • Hypertension    • Injury of back        No past surgical history on file.    Allergies   Allergen Reactions   • Nsaids GI Bleeding       No current facility-administered medications on file prior to encounter.      Current Outpatient Medications on File Prior to Encounter   Medication Sig Dispense Refill   • clonazePAM (KlonoPIN) 0.5 MG tablet Take 0.5 mg by mouth 2 (Two) Times a Day. TAKE 0.5MG IN AM, TAKE 1MG DAILY AT 4PM     • divalproex (DEPAKOTE) 500 MG DR tablet Take 500 mg by mouth 2 (Two) Times a Day.     • DULoxetine (CYMBALTA) 20 MG capsule Take 60 mg by mouth 2 (Two) Times a Day.     • ferrous gluconate 324 (37.5 Fe) MG tablet tablet Take 1 mg by mouth Daily With Breakfast.     • insulin lispro (humaLOG) 100 UNIT/ML injection Inject 26 Units under the skin into the appropriate area as directed 2 (Two) Times a Day.     • propranolol (INDERAL) 10 MG tablet Take 10 mg by mouth Daily.     • traZODone (DESYREL) 150 MG tablet Take 150 mg  by mouth Every Night.           Current Facility-Administered Medications:   •  ! [facility], , Does not apply, Q12H, Jose Rush, [facility]  •  dextrose (D50W) 25 g/ 50mL Intravenous Solution 25 g, 25 g, Intravenous, Q15 Min PRN, Bonny Putnam MD  •  dextrose (GLUTOSE) oral gel 15 g, 15 g, Oral, Q15 Min PRN, Bonny Putnam MD  •  glucagon (human recombinant) (GLUCAGEN DIAGNOSTIC) injection 1 mg, 1 mg, Subcutaneous, PRN, Bonny Putnam MD  •  HYDROmorphone (DILAUDID) injection 1 mg, 1 mg, Intravenous, Q2H PRN, Vivi Humphreys MD  •  insulin regular (humuLIN R,novoLIN R) injection 0-7 Units, 0-7 Units, Subcutaneous, Q6H, Gladis Bhakta APRN  •  LORazepam (ATIVAN) injection 0.5 mg, 0.5 mg, Intravenous, Q12H PRN, Bonny Putnam MD, 0.5 mg at 03/18/19 0328  •  piperacillin-tazobactam (ZOSYN) 3.375 g in iso-osmotic dextrose 50 ml (premix), 3.375 g, Intravenous, Q6H, Bonny Putnam MD, 3.375 g at 03/18/19 1202  •  sodium chloride 0.9 % bolus 1,000 mL, 1,000 mL, Intravenous, Once, Vivi Humphreys MD  •  [COMPLETED] Insert peripheral IV, , , Once **AND** sodium chloride 0.9 % flush 10 mL, 10 mL, Intravenous, PRN, Leidy Osborne, APRN  •  sodium chloride 0.9 % flush 10 mL, 10 mL, Intravenous, Q12H, Fior Ferrell APRN  •  sodium chloride 0.9 % flush 10 mL, 10 mL, Intravenous, PRN, Fior Ferrell APRN  •  sodium chloride 0.9 % flush 20 mL, 20 mL, Intravenous, PRN, Fior Ferrell APRN  •  sodium chloride 0.9 % infusion, 125 mL/hr, Intravenous, Continuous, Bonny Putnam MD, Last Rate: 125 mL/hr at 03/18/19 1235, 125 mL/hr at 03/18/19 1235    Family History   Problem Relation Age of Onset   • Diabetes Father    • Stroke Father    • Kidney failure Father      Social History     Socioeconomic History   • Marital status:      Spouse name: Not on file   • Number of children: Not on file   • Years of education: Not on  "file   • Highest education level: Not on file   Social Needs   • Financial resource strain: Not on file   • Food insecurity - worry: Not on file   • Food insecurity - inability: Not on file   • Transportation needs - medical: Not on file   • Transportation needs - non-medical: Not on file   Occupational History   • Not on file   Tobacco Use   • Smoking status: Never Smoker   • Smokeless tobacco: Never Used   Substance and Sexual Activity   • Alcohol use: No   • Drug use: No   • Sexual activity: Defer   Other Topics Concern   • Not on file   Social History Narrative   • Not on file       Review of Systems:  Review of Systems   Constitutional: Negative for fatigue and fever.   HENT: Negative for drooling, hearing loss, postnasal drip and sore throat.    Eyes: Negative for photophobia and visual disturbance.   Respiratory: Negative for chest tightness and shortness of breath.    Cardiovascular: Negative for chest pain and leg swelling.   Gastrointestinal: Positive for abdominal distention, constipation and nausea.   Endocrine: Positive for polyphagia. Negative for polyuria.   Genitourinary: Negative for difficulty urinating, flank pain and hematuria.   Musculoskeletal: Negative for arthralgias and myalgias.   Skin: Negative for color change and pallor.   Allergic/Immunologic: Negative for immunocompromised state.   Neurological: Positive for headaches. Negative for speech difficulty and light-headedness.   Hematological: Negative for adenopathy.   Psychiatric/Behavioral: Negative for behavioral problems, dysphoric mood and self-injury.     Otherwise the 12 point review of systems is negative.    /69 (BP Location: Left arm, Patient Position: Lying)   Pulse 100   Temp 97.7 °F (36.5 °C) (Oral)   Resp 16   Ht 175.3 cm (69\")   Wt 91.2 kg (201 lb 2 oz)   SpO2 100%   BMI 29.70 kg/m²   Body mass index is 29.7 kg/m².    General: Mild distress  HEENT: PER, no icterus, normal sclerae  Cardiac: regular rhythm,  no " audible rubs  Pulmonary: bilateral breath sounds, non labored  Abdominal: Soft, tender in the lower abdomen, no evidence of a generalized peritonitis  Neurologic: awake, alert, no obvious focal deficits  Extremities: warm, no edema  Skin: no obvious rashes nor worrisome lesions seen     CBC  Results from last 7 days   Lab Units 03/18/19  0323   WBC 10*3/mm3 15.34*   HEMOGLOBIN g/dL 12.8*   HEMATOCRIT % 37.9*   PLATELETS 10*3/mm3 130*       CMP  Results from last 7 days   Lab Units 03/18/19  0323 03/17/19  2042   SODIUM mmol/L 139 132   POTASSIUM mmol/L 3.7 5.5   CHLORIDE mmol/L 104 98*   CO2 mmol/L 26.0 23.0   BUN mg/dL 14 15   CREATININE mg/dL 0.97 1.13   CALCIUM mg/dL 8.1* 8.8   BILIRUBIN mg/dL  --  0.5   ALK PHOS U/L  --  47   ALT (SGPT) U/L  --  53*   AST (SGOT) U/L  --  137*   GLUCOSE mg/dL 153* 253*       Radiology  Imaging Results (last 72 hours)     Procedure Component Value Units Date/Time    CT Abdomen Pelvis With Contrast [[id number]] Collected:  03/17/19 2144     Updated:  03/17/19 2242    Narrative:       EXAM:    CT Abdomen and Pelvis With Contrast     EXAM DATE/TIME:    3/17/2019 9:44 PM     CLINICAL HISTORY:    44 years old, male; Pain; Abdominal pain; Localized; Lower; Patient HX: Low   abd pain, diarrhea     TECHNIQUE:    Axial computed tomography images of the abdomen and pelvis with intravenous   contrast.    All CT scans at this facility use at least one of these dose optimization   techniques: automated exposure control; mA and/or kV adjustment per patient   size (includes targeted exams where dose is matched to clinical indication); or   iterative reconstruction.    Coronal reformatted images were created and reviewed.     CONTRAST:    Contrast Material: 100 ml of iso 300; Contrast Route: existing     COMPARISON:    No relevant prior studies available.     FINDINGS:    Lower thorax: No acute findings.     ABDOMEN:    Liver: Normal. No mass.    Gallbladder and bile ducts: Normal. No calcified  stones. No ductal dilation.    Pancreas: Normal. No ductal dilation.    Spleen: Normal. No splenomegaly.    Adrenals: Normal. No mass.    Kidneys and ureters: Normal. No hydronephrosis.    Stomach and bowel:  Colonic diverticulosis. Segment of wall thickening at the   junction of the sigmoid and descending colon, consistent in appearance with   acute diverticulitis. There is surrounding fat stranding. There are adjacent   small locules of extraluminal air.    Appendix: A normal appendix is identified.    PELVIS:    Bladder: Unremarkable as visualized.    Reproductive: Unremarkable as visualized.     ABDOMEN and PELVIS:    Intraperitoneal space: See above. No abscess.   Bones/joints: No acute fracture. No dislocation.    Soft tissues: Unremarkable.    Vasculature: Normal. No abdominal aortic aneurysm.    Lymph nodes: Normal. No enlarged lymph nodes.       Impression:       Acute perforated diverticulitis at the junction of the sigmoid and descending   colon. No abscess.    THIS DOCUMENT HAS BEEN ELECTRONICALLY SIGNED BY JOSE G TREVIZO MD            Assessment:  Perforated sigmoid diverticulitis  Diabetes mellitus  Hypertension    Plan:  Clear liquids, IV fluid resuscitation, IV antibiotics, and judicious glucose control.  I personally reviewed the CT images of his abdomen and pelvis.  I think he will get over this without an acute operation at this point.  I discussed this with the patient and his mother (via telephone).  Repeat labs in the morning.    Marco A Dumont MD  03/18/19  3:23 PM

## 2019-03-18 NOTE — PROGRESS NOTES
Discharge Planning Assessment  [facility]     Patient Name: Joshua Power  MRN: [id number]  Today's Date: 3/18/2019    Admit Date: 3/17/2019    Discharge Needs Assessment     Row Name 03/18/19 1543       Living Environment    Lives With  alone    Current Living Arrangements  home/apartment/condo    Primary Care Provided by  self    Provides Primary Care For  no one    Family Caregiver if Needed  none    Able to Return to Prior Arrangements  yes       Resource/Environmental Concerns    Resource/Environmental Concerns  none    Transportation Concerns  car, none       Transition Planning    Patient/Family Anticipates Transition to  home    Patient/Family Anticipated Services at Transition  none    Transportation Anticipated  family or friend will provide tells me a friend will drive him home       Discharge Needs Assessment    Readmission Within the Last 30 Days  no previous admission in last 30 days    Concerns to be Addressed  no discharge needs identified    Equipment Currently Used at Home  -- has a cpap machine from [facility] [employer], needs further instruction and wishes to change agencies. I will check to see if he can do this. Has a glucometer    Anticipated Changes Related to Illness  none    Equipment Needed After Discharge  none    Current Discharge Risk  lives alone        Discharge Plan     Row Name 03/18/19 1548       Plan    Plan  home    Patient/Family in Agreement with Plan  yes    Plan Comments  Patient anticipates home at discharge. He would like to change PCP to [facility] as well as endocrinologist.     Final Discharge Disposition Code  01 - home or self-care        Destination      No service coordination in this encounter.      Durable Medical Equipment      No service coordination in this encounter.      Dialysis/Infusion      No service coordination in this encounter.      Home Medical Care      No service coordination in this encounter.      Community Resources      No service coordination in this  encounter.          Demographic Summary     Row Name 03/18/19 1541       General Information    Admission Type  inpatient    Referral Source  admission list    Preferred Language  English    General Information Comments  PCP is Riky Saenz, patient wishes a [facility] PCP        Contact Information    Permission Granted to Share Info With      Contact Information Obtained for      Contact Information Comments  [phone]        Functional Status     Row Name 03/18/19 1541       Functional Status    Usual Activity Tolerance  excellent    Current Activity Tolerance  excellent       Functional Status, IADL    Medications  independent    Meal Preparation  independent    Housekeeping  independent    Laundry  independent    Shopping  independent       Employment/    Employment/ Comments  Has [place] [employer]        Psychosocial    No documentation.       Abuse/Neglect    No documentation.       Legal    No documentation.       Substance Abuse    No documentation.       Patient Forms    No documentation.           Ingris Garrison RN

## 2019-03-18 NOTE — PLAN OF CARE
Problem: Patient Care Overview  Goal: Plan of Care Review  Outcome: Ongoing (interventions implemented as appropriate)   03/18/19 0037 03/18/19 0413   Plan of Care Review   Progress --  improving   OTHER   Outcome Summary --  VSS. Resting well on shift. Fluids running. Bolus completed. Consult in the am. Continue to monitor.   Coping/Psychosocial   Plan of Care Reviewed With patient --      Goal: Individualization and Mutuality  Outcome: Ongoing (interventions implemented as appropriate)    Goal: Discharge Needs Assessment  Outcome: Ongoing (interventions implemented as appropriate)    Goal: Interprofessional Rounds/Family Conf  Outcome: Ongoing (interventions implemented as appropriate)

## 2019-03-18 NOTE — PLAN OF CARE
Problem: Infection, Risk/Actual (Adult)  Goal: Infection Prevention/Resolution  Outcome: Ongoing (interventions implemented as appropriate)   03/18/19 3093   Infection, Risk/Actual (Adult)   Infection Prevention/Resolution making progress toward outcome

## 2019-03-18 NOTE — PROGRESS NOTES
"    [facility] Medicine Services  PROGRESS NOTE    Patient Name: Joshua Power  : 1975  MRN: [id number]    Date of Admission: 3/17/2019  Length of Stay: 0  Primary Care Physician: Riky Saenz MD    Subjective   Subjective     CC:  Abd pain    HPI:  Bilat lower abd soreness, no nausea.  Had diarrhea yesterday.    Had diverticulitis 7 months ago; has not had colonoscopy since age 35.     Seemed NAD when I was there, but three minutes after I left, PICC nurse went in and he told her he might check himself out AMA because he feels so bad.    Review of Systems   Gen-  Mouth dry, has headache.  Has been up to BR today and is less dizzy than yest.   CV- No chest pain, palpitations  Resp- No cough, dyspnea.  Has CONSTANTIN but doesn't use CPAP at home - \"Its my fault, I know.\"  Endo - DM since age 35, no neuropathy or retinopathy, rarely sees MD        Otherwise ROS is negative except as mentioned in the HPI.    Objective   Objective     Vital Signs:   Temp:  [97.4 °F (36.3 °C)-99 °F (37.2 °C)] 97.7 °F (36.5 °C)  Heart Rate:  [] 100  Resp:  [16-18] 16  BP: (115-143)/(61-99) 115/69        Physical Exam:  Constitutional: Awake, alert, pleasant NAD  Eyes: PERRLA, sclerae anicteric, no conjunctival injection  HENT: NCAT, mucous membranes dry  Neck: Supple, no thyromegaly, no lymphadenopathy, trachea midline  Respiratory: Clear to auscultation bilaterally, nonlabored respirations   Cardiovascular: RRR, no murmurs, rubs, or gallops, palpable pedal pulses bilaterally  Gastrointestinal: pos BS.  Tender BLQ.  Severe rebound pain.  Abd soft.    Musculoskeletal: No bilateral ankle edema, no clubbing or cyanosis to extremities  Psychiatric: Appropriate affect, cooperative  Neurologic: Oriented x 3, strength symmetric in all extremities, Cranial Nerves grossly intact to confrontation, speech clear  Skin: No rashes    Results Reviewed:  I have personally reviewed current lab, radiology, and data " and agree.    Results from last 7 days   Lab Units 03/18/19  0323 03/17/19  2042   WBC 10*3/mm3 15.34* 14.09*   HEMOGLOBIN g/dL 12.8* 15.6   HEMATOCRIT % 37.9* 44.6   PLATELETS 10*3/mm3 130* 179     Results from last 7 days   Lab Units 03/18/19  0323 03/17/19  2042   SODIUM mmol/L 139 132   POTASSIUM mmol/L 3.7 5.5   CHLORIDE mmol/L 104 98*   CO2 mmol/L 26.0 23.0   BUN mg/dL 14 15   CREATININE mg/dL 0.97 1.13   GLUCOSE mg/dL 153* 253*   CALCIUM mg/dL 8.1* 8.8   ALT (SGPT) U/L  --  53*   AST (SGOT) U/L  --  137*     Estimated Creatinine Clearance: 108.5 mL/min (by C-G formula based on SCr of 0.97 mg/dL).    No results found for: BNP    Microbiology Results Abnormal     Procedure Component Value - Date/Time    Blood Culture - Blood, Arm, Left [[id number]] Collected:  03/17/19 2325    Lab Status:  Preliminary result Specimen:  Blood from Arm, Left Updated:  03/18/19 1201     Blood Culture No growth at less than 24 hours    Blood Culture - Blood, Arm, Left [[id number]] Collected:  03/17/19 2325    Lab Status:  Preliminary result Specimen:  Blood from Arm, Left Updated:  03/18/19 1201     Blood Culture No growth at less than 24 hours          Imaging Results (last 24 hours)     Procedure Component Value Units Date/Time    CT Abdomen Pelvis With Contrast [[id number]] Collected:  03/17/19 2144     Updated:  03/17/19 2242    Narrative:       EXAM:    CT Abdomen and Pelvis With Contrast     EXAM DATE/TIME:    3/17/2019 9:44 PM     CLINICAL HISTORY:    44 years old, male; Pain; Abdominal pain; Localized; Lower; Patient HX: Low   abd pain, diarrhea     TECHNIQUE:    Axial computed tomography images of the abdomen and pelvis with intravenous   contrast.    All CT scans at this facility use at least one of these dose optimization   techniques: automated exposure control; mA and/or kV adjustment per patient   size (includes targeted exams where dose is matched to clinical indication); or   iterative reconstruction.    Coronal  reformatted images were created and reviewed.     CONTRAST:    Contrast Material: 100 ml of iso 300; Contrast Route: existing     COMPARISON:    No relevant prior studies available.     FINDINGS:    Lower thorax: No acute findings.     ABDOMEN:    Liver: Normal. No mass.    Gallbladder and bile ducts: Normal. No calcified stones. No ductal dilation.    Pancreas: Normal. No ductal dilation.    Spleen: Normal. No splenomegaly.    Adrenals: Normal. No mass.    Kidneys and ureters: Normal. No hydronephrosis.    Stomach and bowel:  Colonic diverticulosis. Segment of wall thickening at the   junction of the sigmoid and descending colon, consistent in appearance with   acute diverticulitis. There is surrounding fat stranding. There are adjacent   small locules of extraluminal air.    Appendix: A normal appendix is identified.    PELVIS:    Bladder: Unremarkable as visualized.    Reproductive: Unremarkable as visualized.     ABDOMEN and PELVIS:    Intraperitoneal space: See above. No abscess.   Bones/joints: No acute fracture. No dislocation.    Soft tissues: Unremarkable.    Vasculature: Normal. No abdominal aortic aneurysm.    Lymph nodes: Normal. No enlarged lymph nodes.       Impression:       Acute perforated diverticulitis at the junction of the sigmoid and descending   colon. No abscess.    THIS DOCUMENT HAS BEEN ELECTRONICALLY SIGNED BY JOSE G TREVIZO MD               I have reviewed the medications:    Current Facility-Administered Medications:   •  ! [facility], , Does not apply, Q12H, Jose Rush, [facility]  •  dextrose (D50W) 25 g/ 50mL Intravenous Solution 25 g, 25 g, Intravenous, Q15 Min PRN, Bonny Putnam MD  •  dextrose (GLUTOSE) oral gel 15 g, 15 g, Oral, Q15 Min PRN, Bonny Putnam MD  •  glucagon (human recombinant) (GLUCAGEN DIAGNOSTIC) injection 1 mg, 1 mg, Subcutaneous, PRN, Bonny Putnam MD  •  HYDROmorphone (DILAUDID) injection 0.5 mg, 0.5  mg, Intravenous, Q2H PRN, Bonny Putnam MD, 0.5 mg at 03/18/19 1246  •  insulin regular (humuLIN R,novoLIN R) injection 0-7 Units, 0-7 Units, Subcutaneous, Q6H, Gladis Bhakta APRN  •  LORazepam (ATIVAN) injection 0.5 mg, 0.5 mg, Intravenous, Q12H PRN, Bonny Putnam MD, 0.5 mg at 03/18/19 0328  •  piperacillin-tazobactam (ZOSYN) 3.375 g in iso-osmotic dextrose 50 ml (premix), 3.375 g, Intravenous, Q6H, Bonny Putnam MD, 3.375 g at 03/18/19 1202  •  [COMPLETED] Insert peripheral IV, , , Once **AND** sodium chloride 0.9 % flush 10 mL, 10 mL, Intravenous, PRN, Leidy Osborne APRN  •  sodium chloride 0.9 % infusion, 125 mL/hr, Intravenous, Continuous, Bonny Putnam MD, Last Rate: 125 mL/hr at 03/18/19 1235, 125 mL/hr at 03/18/19 1235      Assessment/Plan   Assessment / Plan     Active Hospital Problems    Diagnosis Date Noted   • Diverticulitis of colon with perforation [K57.20] 03/18/2019   • HTN (hypertension) [I10] 03/18/2019   • Type 2 diabetes mellitus (CMS/HCC) [E11.9] 03/18/2019   • Anemia [D64.9] 03/18/2019   • Perforated diverticulum [K57.80] 03/18/2019   • CONSTANTIN (obstructive sleep apnea) [G47.33] 03/18/2019   • Depression [F32.9] 03/18/2019   • Chronic pain syndrome [G89.4] 03/18/2019   • Dehydration [E86.0] 03/18/2019   • Lactic acidosis [E87.2] 03/18/2019          Brief Hospital Course to date:  Joshua Power is a 44 y.o. male with history of CONSTANTIN, HTN, depression, IDDM2, and chronic pain syndrome, admitted with complicated recurrent diverticulitis.  First episode 7 months ago, treated at [facility].       Diverticulitis with microperf  - abx day 1  - ID and Gen Surg consulted  - Lactic acidosis:  Fluids - give additional bolus    DM2  - normally takes Humalog 26 units BID  - 8.8 A1c  - basal/SSI.  Currently NPO    CONSTANTIN  - CPAP at night    LFTs elev  - denies Etoh use  - liver, spleen and GB nl on CT    Anxiety, chronic benzos  - continue home meds when able to take po.   -  IV ativan for now.       DVT Prophylaxis:      Disposition: I expect the patient to be discharged tbd.    CODE STATUS:   Code Status and Medical Interventions:   Ordered at: 03/18/19 0001     Code Status:    CPR     Medical Interventions (Level of Support Prior to Arrest):    Full         Electronically signed by Vivi Humphreys MD, 03/18/19, 1:53 PM.

## 2019-03-18 NOTE — CONSULTS
INFECTIOUS DISEASE CONSULT/INITIAL HOSPITAL VISIT    Joshua Power  1975  [id number]    Date of Consult: 3/18/2019    Admission Date: 3/17/2019      Requesting Provider: Vivi Humphreys MD  Evaluating Physician: Amador Vega MD    Reason for Consultation: acute diverticulitis with perforation     History of present illness:    Patient is a 44 y.o. male, seen today for acute diverticulitis with perforation.  He presented to the ED with sharp bilateral pelvic pain.  CT scan with perorated diverticulitis, no abscess.  He had a previous episode of diverticulitis last year treated at . He did not have a follow up colonoscopy.  Admitting labs with a leukocytosis of 14, 000, elevated LFTs,  Lactic acidosis, and he has been afebrile.  He denies any fever, but has been experiencing chills.     Past Medical History:   Diagnosis Date   • Anemia    • Arthritis    • Diabetes mellitus (CMS/HCC)    • DISH (diffuse idiopathic skeletal hyperostosis)    • Headache    • Hypertension    • Injury of back        History reviewed. No pertinent surgical history.    Family History   Problem Relation Age of Onset   • Diabetes Father    • Stroke Father    • Kidney failure Father        Social History     Socioeconomic History   • Marital status:      Spouse name: Not on file   • Number of children: Not on file   • Years of education: Not on file   • Highest education level: Not on file   Social Needs   • Financial resource strain: Not on file   • Food insecurity - worry: Not on file   • Food insecurity - inability: Not on file   • Transportation needs - medical: Not on file   • Transportation needs - non-medical: Not on file   Occupational History   • Not on file   Tobacco Use   • Smoking status: Never Smoker   • Smokeless tobacco: Never Used   Substance and Sexual Activity   • Alcohol use: No   • Drug use: No   • Sexual activity: Defer   Other Topics Concern   • Not on file   Social History Narrative   • Not on file        Allergies   Allergen Reactions   • Nsaids GI Bleeding         Medication:    Current Facility-Administered Medications:   •  ! Home medication [facility], , Does not apply, Q12H, Jose Rush, [facility]  •  clonazePAM (KlonoPIN) tablet 0.5 mg, 0.5 mg, Oral, BID, Vivi Humphreys MD  •  dextrose (D50W) 25 g/ 50mL Intravenous Solution 25 g, 25 g, Intravenous, Q15 Min PRN, Bonny Putnam MD  •  dextrose (GLUTOSE) oral gel 15 g, 15 g, Oral, Q15 Min PRN, Bonny Putnam MD  •  divalproex (DEPAKOTE) DR tablet 500 mg, 500 mg, Oral, Q12H, Vivi Humphreys MD  •  DULoxetine (CYMBALTA) DR capsule 60 mg, 60 mg, Oral, BID, Vivi Humphreys MD  •  famotidine (PEPCID) tablet 20 mg, 20 mg, Oral, BID, Marco A Dumont MD  •  glucagon (human recombinant) (GLUCAGEN DIAGNOSTIC) injection 1 mg, 1 mg, Subcutaneous, PRN, Bonny Putnam MD  •  HYDROcodone-acetaminophen (NORCO) 5-325 MG per tablet 1 tablet, 1 tablet, Oral, Q6H PRN, Marco A Dumont MD  •  HYDROmorphone (DILAUDID) injection 1 mg, 1 mg, Intravenous, Q2H PRN, Vivi Humphreys MD  •  insulin regular (humuLIN R,novoLIN R) injection 0-7 Units, 0-7 Units, Subcutaneous, Q6H, Gladis Bhakta, [facility]AL  •  LORazepam (ATIVAN) injection 0.5 mg, 0.5 mg, Intravenous, Q12H PRN, Bonny Putnam MD, 0.5 mg at 03/18/19 0328  •  methylnaltrexone (RELISTOR) injection 6 mg, 6 mg, Subcutaneous, Every Other Day, Marco A Dumont MD  •  piperacillin-tazobactam (ZOSYN) 3.375 g in iso-osmotic dextrose 50 ml (premix), 3.375 g, Intravenous, Q6H, Bonny Putnam MD, 3.375 g at 03/18/19 1202  •  [START ON 3/19/2019] propranolol (INDERAL) tablet 10 mg, 10 mg, Oral, Daily, Vivi Humphreys MD  •  sodium chloride 0.9 % bolus 1,000 mL, 1,000 mL, Intravenous, Once, Vivi Humphreys MD, Last Rate: 500 mL/hr at 03/18/19 1545, 1,000 mL at 03/18/19 1545  •  [COMPLETED] Insert peripheral IV, , , Once **AND** sodium chloride 0.9 % flush 10 mL, 10  mL, Intravenous, PRN, Leidy Osborne APRN  •  sodium chloride 0.9 % flush 10 mL, 10 mL, Intravenous, Q12H, Fior Ferrell APRN  •  sodium chloride 0.9 % flush 10 mL, 10 mL, Intravenous, PRN, Fior Ferrell APRN  •  sodium chloride 0.9 % flush 20 mL, 20 mL, Intravenous, PRN, Fior Ferrell APRN  •  sodium chloride 0.9 % infusion, 125 mL/hr, Intravenous, Continuous, Bonny Putnam MD, Last Rate: 125 mL/hr at 03/18/19 1235, 125 mL/hr at 03/18/19 1235  •  traZODone (DESYREL) tablet 75 mg, 75 mg, Oral, Nightly, Vivi Humphreys MD    Antibiotics:  Anti-Infectives (From admission, onward)    Ordered     Dose/Rate Route Frequency Start Stop    03/18/19 0100  piperacillin-tazobactam (ZOSYN) 3.375 g in iso-osmotic dextrose 50 ml (premix)     Ordering Provider:  Bonny Putnam MD    3.375 g  over 30 Minutes Intravenous Every 6 Hours 03/18/19 0600 03/23/19 0559    03/17/19 2306  piperacillin-tazobactam (ZOSYN) 4.5 g in iso-osmotic dextrose 100 mL IVPB (premix)     Ordering Provider:  Leidy Osborne APRN    4.5 g  over 30 Minutes Intravenous Once 03/17/19 2308 03/18/19 0032            Review of Systems:  Constitutional-- No Fever, +  chills or sweats.  Appetite good, and no malaise. No fatigue.  HEENT-- No new vision, hearing or throat complaints.  No epistaxis or oral sores.  Denies odynophagia or dysphagia. No headache, photophobia or neck stiffness.  CV-- No chest pain, palpitation or syncope  Resp-- No SOB/cough/Hemoptysis  GI- No nausea, vomiting, or diarrhea  Bilateral lower quadrant pain  -- No dysuria, hematuria, or flank pain.  Denies hesitancy, urgency or flank pain.  Lymph- no swollen lymph nodes in neck/axilla or groin.   Heme- No active bruising or bleeding; no Hx of DVT or PE.  MS-- no swelling or pain in the bones or joints of arms/legs.  No new back pain.  Neuro-- No acute focal weakness or numbness in the arms or legs.  No seizures.  Skin--No rashes or  lesions      Physical Exam:   Vital Signs  Temp (24hrs), Av.4 °F (36.9 °C), Min:97.4 °F (36.3 °C), Max:99.3 °F (37.4 °C)    Temp  Min: 97.4 °F (36.3 °C)  Max: 99.3 °F (37.4 °C)  BP  Min: 115/69  Max: 143/99  Pulse  Min: 79  Max: 110  Resp  Min: 16  Max: 18  SpO2  Min: 95 %  Max: 100 %    GENERAL: Awake and alert, in no acute distress.   HEENT: Normocephalic, atraumatic.  PERRL. EOMI. No conjunctival injection. No icterus. Oropharynx clear without evidence of thrush or exudate. No evidence of peridontal disease.    NECK: Supple without nuchal rigidity. No mass.  LYMPH: No cervical, axillary or inguinal lymphadenopathy.  HEART: RRR; No murmur, rubs, gallops.   LUNGS: Clear to auscultation bilaterally without wheezing, rales, rhonchi. Normal respiratory effort. Nonlabored. No dullness.  ABDOMEN: Soft, moderately tender R> L lower quadrants  EXT:  No cyanosis, clubbing or edema. No cord.  : Genitalia generally unremarkable.  Without Puente catheter.  MSK: FROM without joint effusions noted arms/legs.    SKIN: Warm and dry without cutaneous eruptions on Inspection/palpation.    NEURO: Oriented to PPT. No focal deficits on motor/sensory exam at arms/legs.  PSYCHIATRIC: Normal insight and judgement. Cooperative with PE    Laboratory Data    Results from last 7 days   Lab Units 19   WBC 10*3/mm3 15.34* 14.09*   HEMOGLOBIN g/dL 12.8* 15.6   HEMATOCRIT % 37.9* 44.6   PLATELETS 10*3/mm3 130* 179     Results from last 7 days   Lab Units 19  0323   SODIUM mmol/L 139   POTASSIUM mmol/L 3.7   CHLORIDE mmol/L 104   CO2 mmol/L 26.0   BUN mg/dL 14   CREATININE mg/dL 0.97   GLUCOSE mg/dL 153*   CALCIUM mg/dL 8.1*     Results from last 7 days   Lab Units 19   ALK PHOS U/L 47   BILIRUBIN mg/dL 0.5   ALT (SGPT) U/L 53*   AST (SGOT) U/L 137*             Results from last 7 days   Lab Units 19  0342   LACTATE mmol/L 1.5             Estimated Creatinine Clearance: 108.5 mL/min (by C-G  formula based on SCr of 0.97 mg/dL).      Microbiology:      3/17:  BC pending                           Radiology:  Imaging Results (last 72 hours)     Procedure Component Value Units Date/Time    CT Abdomen Pelvis With Contrast [[id number]] Collected:  03/17/19 2144     Updated:  03/17/19 2242    Narrative:       EXAM:    CT Abdomen and Pelvis With Contrast     EXAM DATE/TIME:    3/17/2019 9:44 PM     CLINICAL HISTORY:    44 years old, male; Pain; Abdominal pain; Localized; Lower; Patient HX: Low   abd pain, diarrhea     TECHNIQUE:    Axial computed tomography images of the abdomen and pelvis with intravenous   contrast.    All CT scans at this facility use at least one of these dose optimization   techniques: automated exposure control; mA and/or kV adjustment per patient   size (includes targeted exams where dose is matched to clinical indication); or   iterative reconstruction.    Coronal reformatted images were created and reviewed.     CONTRAST:    Contrast Material: 100 ml of iso 300; Contrast Route: existing     COMPARISON:    No relevant prior studies available.     FINDINGS:    Lower thorax: No acute findings.     ABDOMEN:    Liver: Normal. No mass.    Gallbladder and bile ducts: Normal. No calcified stones. No ductal dilation.    Pancreas: Normal. No ductal dilation.    Spleen: Normal. No splenomegaly.    Adrenals: Normal. No mass.    Kidneys and ureters: Normal. No hydronephrosis.    Stomach and bowel:  Colonic diverticulosis. Segment of wall thickening at the   junction of the sigmoid and descending colon, consistent in appearance with   acute diverticulitis. There is surrounding fat stranding. There are adjacent   small locules of extraluminal air.    Appendix: A normal appendix is identified.    PELVIS:    Bladder: Unremarkable as visualized.    Reproductive: Unremarkable as visualized.     ABDOMEN and PELVIS:    Intraperitoneal space: See above. No abscess.   Bones/joints: No acute fracture. No  dislocation.    Soft tissues: Unremarkable.    Vasculature: Normal. No abdominal aortic aneurysm.    Lymph nodes: Normal. No enlarged lymph nodes.       Impression:       Acute perforated diverticulitis at the junction of the sigmoid and descending   colon. No abscess.    THIS DOCUMENT HAS BEEN ELECTRONICALLY SIGNED BY JOSE G TREVIZO MD        I read his CT scan    Impression:   1.  Acute sigmoid diverticulitis-with perforation.  I will plan to simplify his antibiotics at the time of discharge to either Invanz or ceftriaxone/Flagyl.  He will likely require approximately 2 weeks of intravenous antibiotic therapy and probably should ultimately undergo surgical resection of this area on an elective basis.  2.  Leukocytosis/neutrophilia-secondary to above  3.  Elevated LFTs  4.  Diabetes mellitus, type 2      PLAN/RECOMMENDATIONS:   Thank you for asking us to see Joshua Power, I recommend the followin.  Continue Zosyn for now  2.  PICC line placement  3.  Surgery consult    Dr. Vega has obtained the history, performed the physical exam and formulated the above treatment plan.        I discussed his situation with Dr. Dumont today.  We will plan for him to discharge soon on intravenous antibiotic therapy and consider a partial colectomy in 3-4 weeks.    Amador Vega MD  3/18/2019  4:45 PM

## 2019-03-18 NOTE — PAYOR COMM NOTE
"Joshua Nielson (44 y.o. Male) Initial notification and clinicals. Sonia Jefferson RN      Date of Birth Social Security Number Address Home Phone MRN    [id number]  [address]  [address]  [facility] [address] [phone] [id number]    Cheondo[place] Marital Status          None        Admission Date Admission Type Admitting Provider Attending Provider Department, Room/Bed    3/17/19 Emergency Bonny Putnam MD Khamvanthong, Phonekeo, MD [facility] 5B, N538/1    Discharge Date Discharge Disposition Discharge Destination                       Attending Provider:  Bonny Putnam MD    Allergies:  Nsaids    Isolation:  None   Infection:  None   Code Status:  CPR    Ht:  175.3 cm (69\")   Wt:  91.2 kg (201 lb 2 oz)    Admission Cmt:  None   Principal Problem:  None                Active Insurance as of 3/17/2019     Primary Coverage     Payor Plan Insurance Group Employer/Plan Group    ANTHEM MEDICAID ANTHEM MEDICAID [id number]     Payor Plan Address Payor Plan Phone Number Payor Plan Fax Number Effective Dates    [address] [address] [phone]  10/1/2016 - None Entered    [facility] [id number]       Subscriber Name Subscriber Birth Date Member ID       JOSHUA NIELSON 1975 [id number]                 Emergency Contacts      (Rel.) Home Phone Work Phone Mobile Phone    Sana Nielson (Mother) [phone] -- [phone]            Problem List           Codes Noted - Resolved       Hospital    Diverticulitis of colon with perforation ICD-10-CM: K57.20  ICD-9-CM: 562.11 3/18/2019 - Present    HTN (hypertension) ICD-10-CM: I10  ICD-9-CM: 401.9 3/18/2019 - Present    Type 2 diabetes mellitus (CMS/HCC) ICD-10-CM: E11.9  ICD-9-CM: 250.00 3/18/2019 - Present    Anemia ICD-10-CM: D64.9  ICD-9-CM: 285.9 3/18/2019 - Present    Perforated diverticulum ICD-10-CM: K57.80  ICD-9-CM: 562.10 3/18/2019 - Present    CONSTANTIN (obstructive sleep apnea) ICD-10-CM: " "G47.33  ICD-9-CM: 327.23 3/18/2019 - Present    Depression ICD-10-CM: F32.9  ICD-9-CM: 311 3/18/2019 - Present    Chronic pain syndrome ICD-10-CM: G89.4  ICD-9-CM: 338.4 3/18/2019 - Present          History & Physical     No notes of this type exist for this encounter.        Emergency Department Notes     No notes of this type exist for this encounter.        ICU Vital Signs     Row Name 03/18/19 0037 03/18/19 0020 03/18/19 0002 03/17/19 2130 03/17/19 2000       Height and Weight    Height  175.3 cm (69\")  --  --  --  175.3 cm (69\")    Height Method  Stated  --  --  --  Stated    Weight  91.2 kg (201 lb 2 oz)  --  --  --  83.9 kg (185 lb)    Weight Method  Standing scale  --  --  --  Stated    Ideal Body Weight (IBW) (kg)  73.69  --  --  --  73.69    BSA (Calculated - sq m)  2.07 sq meters  --  --  --  2 sq meters    BMI (Calculated)  29.7  --  --  --  27.3    Weight in (lb) to have BMI = 25  168.9  --  --  --  168.9       Vitals    Temp  99 °F (37.2 °C) nurse advised  98.1 °F (36.7 °C)  --  --  97.4 °F (36.3 °C)    Temp src  Oral  Oral  --  --  --    Pulse  105 nurse advised  107  110  89  79    Heart Rate Source  Monitor  Monitor  --  --  --    Resp  18  16  --  --  16    Resp Rate Source  Visual  Visual  --  --  --    BP  140/80 nurse advised  122/81  122/82  124/79  143/99    Noninvasive MAP (mmHg)  --  --  87  93  --    BP Location  Left arm  Left arm  --  --  --    BP Method  Automatic  Automatic  --  --  --    Patient Position  Lying  Lying  --  --  --       Oxygen Therapy    SpO2  97 %  96 %  96 %  98 %  98 %    Pulse Oximetry Type  Intermittent  Continuous  --  --  --    Device (Oxygen Therapy)  room air  room air  --  --  --        Hospital Medications (all)       Dose Frequency Start End    dextrose (D50W) 25 g/ 50mL Intravenous Solution 25 g 25 g Every 15 Minutes PRN 3/18/2019     Sig - Route: Infuse 50 mL into a venous catheter Every 15 (Fifteen) Minutes As Needed for Low Blood Sugar (Blood Sugar Less " Than 70). - Intravenous    dextrose (GLUTOSE) oral gel 15 g 15 g Every 15 Minutes PRN 3/18/2019     Sig - Route: Take 15 g by mouth Every 15 (Fifteen) Minutes As Needed for Low Blood Sugar (Blood sugar less than 70). - Oral    glucagon (human recombinant) (GLUCAGEN DIAGNOSTIC) injection 1 mg 1 mg As Needed 3/18/2019     Sig - Route: Inject 1 mg under the skin into the appropriate area as directed As Needed (Blood Glucose Less Than 70). - Subcutaneous    HYDROmorphone (DILAUDID) injection 0.5 mg 0.5 mg Every 2 Hours PRN 3/18/2019 3/28/2019    Sig - Route: Infuse 0.5 mL into a venous catheter Every 2 (Two) Hours As Needed for Severe Pain . - Intravenous    HYDROmorphone (DILAUDID) injection 1 mg 1 mg Once 3/17/2019 3/17/2019    Sig - Route: Infuse 1 mL into a venous catheter 1 (One) Time. - Intravenous    insulin detemir (LEVEMIR) injection 15 Units 15 Units Once 3/18/2019     Sig - Route: Inject 15 Units under the skin into the appropriate area as directed 1 (One) Time. - Subcutaneous    insulin lispro (humaLOG) injection 0-7 Units 0-7 Units 4 Times Daily With Meals & Nightly 3/18/2019     Sig - Route: Inject 0-7 Units under the skin into the appropriate area as directed 4 (Four) Times a Day With Meals & at Bedtime. - Subcutaneous    iopamidol (ISOVUE-300) 61 % injection 100 mL 100 mL Once in Imaging 3/17/2019 3/17/2019    Sig - Route: Infuse 100 mL into a venous catheter Once. - Intravenous    LORazepam (ATIVAN) injection 0.5 mg 0.5 mg Every 12 Hours PRN 3/18/2019 3/28/2019    Sig - Route: Infuse 0.25 mL into a venous catheter Every 12 (Twelve) Hours As Needed for Anxiety. - Intravenous    Morphine sulfate (PF) injection 4 mg 4 mg Once 3/17/2019 3/17/2019    Sig - Route: Infuse 1 mL into a venous catheter 1 (One) Time. - Intravenous    ondansetron (ZOFRAN) injection 4 mg 4 mg Once 3/17/2019 3/17/2019    Sig - Route: Infuse 2 mL into a venous catheter 1 (One) Time. - Intravenous    piperacillin-tazobactam (ZOSYN)  "3.375 g in iso-osmotic dextrose 50 ml (premix) 3.375 g Every 6 Hours 3/18/2019 3/23/2019    Sig - Route: Infuse 50 mL into a venous catheter Every 6 (Six) Hours. - Intravenous    piperacillin-tazobactam (ZOSYN) 4.5 g in iso-osmotic dextrose 100 mL IVPB (premix) 4.5 g Once 3/17/2019 3/18/2019    Sig - Route: Infuse 100 mL into a venous catheter 1 (One) Time. - Intravenous    sodium chloride 0.9 % bolus 1,000 mL 1,000 mL Once 3/17/2019 3/17/2019    Sig - Route: Infuse 1,000 mL into a venous catheter 1 (One) Time. - Intravenous    sodium chloride 0.9 % flush 10 mL 10 mL As Needed 3/17/2019     Sig - Route: Infuse 10 mL into a venous catheter As Needed for Line Care. - Intravenous    Linked Group 1:  \"And\" Linked Group Details        sodium chloride 0.9 % infusion 125 mL/hr Continuous 3/18/2019     Sig - Route: Infuse 125 mL/hr into a venous catheter Continuous. - Intravenous          Blood Administration Record (From admission, onward)    None          Lab Results (last 24 hours)     Procedure Component Value Units Date/Time    POC Glucose Once [[id number]]  (Abnormal) Collected:  03/18/19 0045    Specimen:  Blood Updated:  03/18/19 0105     Glucose 200 mg/dL     Procalcitonin [[id number]]  (Normal) Collected:  03/17/19 2325    Specimen:  Blood from Arm, Left Updated:  03/18/19 0031     Procalcitonin <0.05 ng/mL     Narrative:       As a Marker for Sepsis (Non-Neonates):   1. <0.5 ng/mL represents a low risk of severe sepsis and/or septic shock.  2. >2 ng/mL represents a high risk of severe sepsis and/or septic shock.    As a Marker for Lower Respiratory Tract Infections that require antibiotic therapy:    PCT on Admission     Antibiotic Therapy       6-12 Hrs later  > 0.5                Strongly Recommended             >0.25 - <0.5         Recommended  0.1 - 0.25           Discouraged              Remeasure/reassess PCT  <0.1                 Strongly Discouraged     Remeasure/reassess PCT                     PCT values " of < 0.5 ng/mL do not exclude an infection, because localized infections (without systemic signs) may be associated with such low concentrations, or a systemic infection in its initial stages (< 6 hours). Furthermore, increased PCT can occur without infection. PCT concentrations between 0.5 and 2.0 ng/mL should be interpreted taking into account the patient's history. It is recommended to retest PCT within 6-24 hours if any concentrations < 2 ng/mL are obtained.    Lactic Acid, Plasma [[id number]]  (Abnormal) Collected:  03/17/19 2325    Specimen:  Blood from Arm, Left Updated:  03/18/19 0025     Lactate 3.2 mmol/L      Comment: Falsely depressed results may occur on samples drawn from patients receiving N-Acetylcysteine (NAC) or Metamizole.       Lactic Acid, Reflex Timer (This will reflex a repeat order 3-3:15 hours after ordered.) [[id number]] Collected:  03/17/19 2325    Specimen:  Blood from Arm, Left Updated:  03/18/19 0025    Urinalysis With Microscopic If Indicated (No Culture) - Urine, Clean Catch [[id number]]  (Abnormal) Collected:  03/17/19 2338    Specimen:  Urine, Clean Catch Updated:  03/18/19 0014     Color, UA Yellow     Appearance, UA Clear     pH, UA 6.5     Specific Gravity, UA 1.029     Glucose, UA >=1000 mg/dL (3+)     Ketones, UA Negative     Bilirubin, UA Negative     Blood, UA Negative     Protein, UA Negative     Leuk Esterase, UA Negative     Nitrite, UA Negative     Urobilinogen, UA 0.2 E.U./dL    Narrative:       Urine microscopic not indicated.    Blood Culture - Blood, Arm, Left [[id number]] Collected:  03/17/19 2325    Specimen:  Blood from Arm, Left Updated:  03/17/19 2357    Blood Culture - Blood, Arm, Left [[id number]] Collected:  03/17/19 2325    Specimen:  Blood from Arm, Left Updated:  03/17/19 2357    Comprehensive Metabolic Panel [[id number]]  (Abnormal) Collected:  03/17/19 2042    Specimen:  Blood Updated:  03/17/19 2140     Glucose 253 mg/dL      BUN 15 mg/dL      Creatinine 1.13 mg/dL       Sodium 132 mmol/L      Potassium 5.5 mmol/L      Chloride 98 mmol/L      CO2 23.0 mmol/L      Calcium 8.8 mg/dL      Total Protein 7.8 g/dL      Albumin 5.04 g/dL      ALT (SGPT) 53 U/L      AST (SGOT) 137 U/L      Alkaline Phosphatase 47 U/L      Total Bilirubin 0.5 mg/dL      eGFR Non African Amer 70 mL/min/1.73      Globulin 2.8 gm/dL      A/G Ratio 1.8 g/dL      BUN/Creatinine Ratio 13.3     Anion Gap 11.0 mmol/L     Narrative:       National [employer] Guidelines    Stage     Description        GFR  1         Normal or High     90+  2         Mild decrease      60-89  3         Moderate decrease  30-59  4         Severe decrease    15-29  5         Kidney failure     <15    The MDRD GFR formula is only valid for adults with stable renal function between ages 18 and 70.    Lipase [[id number]]  (Abnormal) Collected:  03/17/19 2042    Specimen:  Blood Updated:  03/17/19 2140     Lipase 63 U/L     CBC & Differential [[id number]] Collected:  03/17/19 2042    Specimen:  Blood Updated:  03/17/19 2050    Narrative:       The following orders were created for panel order CBC & Differential.  Procedure                               Abnormality         Status                     ---------                               -----------         ------                     CBC Auto Differential[[id number]]         Abnormal            Final result                 Please view results for these tests on the individual orders.    CBC Auto Differential [[id number]]  (Abnormal) Collected:  03/17/19 2042    Specimen:  Blood Updated:  03/17/19 2050     WBC 14.09 10*3/mm3      RBC 4.70 10*6/mm3      Hemoglobin 15.6 g/dL      Hematocrit 44.6 %      MCV 94.9 fL      MCH 33.2 pg      MCHC 35.0 g/dL      RDW 13.8 %      RDW-SD 47.7 fl      MPV 10.8 fL      Platelets 179 10*3/mm3      Neutrophil % 72.0 %      Lymphocyte % 22.0 %      Monocyte % 5.5 %      Eosinophil % 0.4 %      Basophil % 0.1 %      Immature Grans % 0.6 %      Neutrophils,  Absolute 10.16 10*3/mm3      Lymphocytes, Absolute 3.10 10*3/mm3      Monocytes, Absolute 0.77 10*3/mm3      Eosinophils, Absolute 0.05 10*3/mm3      Basophils, Absolute 0.01 10*3/mm3      Immature Grans, Absolute 0.08 10*3/mm3         Imaging Results (last 24 hours)     Procedure Component Value Units Date/Time    CT Abdomen Pelvis With Contrast [[id number]] Collected:  03/17/19 2144     Updated:  03/17/19 2242    Narrative:       EXAM:    CT Abdomen and Pelvis With Contrast     EXAM DATE/TIME:    3/17/2019 9:44 PM     CLINICAL HISTORY:    44 years old, male; Pain; Abdominal pain; Localized; Lower; Patient HX: Low   abd pain, diarrhea     TECHNIQUE:    Axial computed tomography images of the abdomen and pelvis with intravenous   contrast.    All CT scans at this facility use at least one of these dose optimization   techniques: automated exposure control; mA and/or kV adjustment per patient   size (includes targeted exams where dose is matched to clinical indication); or   iterative reconstruction.    Coronal reformatted images were created and reviewed.     CONTRAST:    Contrast Material: 100 ml of iso 300; Contrast Route: existing     COMPARISON:    No relevant prior studies available.     FINDINGS:    Lower thorax: No acute findings.     ABDOMEN:    Liver: Normal. No mass.    Gallbladder and bile ducts: Normal. No calcified stones. No ductal dilation.    Pancreas: Normal. No ductal dilation.    Spleen: Normal. No splenomegaly.    Adrenals: Normal. No mass.    Kidneys and ureters: Normal. No hydronephrosis.    Stomach and bowel:  Colonic diverticulosis. Segment of wall thickening at the   junction of the sigmoid and descending colon, consistent in appearance with   acute diverticulitis. There is surrounding fat stranding. There are adjacent   small locules of extraluminal air.    Appendix: A normal appendix is identified.    PELVIS:    Bladder: Unremarkable as visualized.    Reproductive: Unremarkable as  visualized.     ABDOMEN and PELVIS:    Intraperitoneal space: See above. No abscess.   Bones/joints: No acute fracture. No dislocation.    Soft tissues: Unremarkable.    Vasculature: Normal. No abdominal aortic aneurysm.    Lymph nodes: Normal. No enlarged lymph nodes.       Impression:       Acute perforated diverticulitis at the junction of the sigmoid and descending   colon. No abscess.    THIS DOCUMENT HAS BEEN ELECTRONICALLY SIGNED BY JOSE G TREVIZO MD        ECG/EMG Results (last 24 hours)     ** No results found for the last 24 hours. **        Orders (last 24 hrs)     Start     Ordered    03/18/19 0800  insulin lispro (humaLOG) injection 0-7 Units  4 Times Daily With Meals & Nightly      03/18/19 0100    03/18/19 0600  POC Glucose Q6H  Every 6 Hours      03/18/19 0100    03/18/19 0600  piperacillin-tazobactam (ZOSYN) 3.375 g in iso-osmotic dextrose 50 ml (premix)  Every 6 Hours      03/18/19 0100    03/18/19 0600  CBC & Differential  Morning Draw      03/18/19 0018    03/18/19 0600  Basic Metabolic Panel  Morning Draw      03/18/19 0018    03/18/19 0600  CBC Auto Differential  PROCEDURE ONCE      03/18/19 0018    03/18/19 0200  sodium chloride 0.9 % infusion  Continuous      03/18/19 0100    03/18/19 0200  insulin detemir (LEVEMIR) injection 15 Units  Once      03/18/19 0100    03/18/19 0106  POC Glucose Once  Once      03/18/19 0045    03/18/19 0101  NPO Diet  Diet Effective Now      03/18/19 0100    03/18/19 0101  Strict Intake & Output  Every Shift      03/18/19 0100    03/18/19 0101  Inpatient General Surgery Consult  Once     Specialty:  General Surgery  Provider:  (Not yet assigned)    03/18/19 0100    03/18/19 0101  Do NOT Hold Basal Insulin When Patient is NPO, Hold Bolus Dose if NPO  Continuous      03/18/19 0100    03/18/19 0101  Follow [facility] Hypoglycemia Standing Orders For Blood Glucose Less Than 70 mg/dL  Until Discontinued      03/18/19 0100    03/18/19 0101  Hypoglycemia Treatment - Alert Patient  That is Not NPO and Can Safely Swallow  Until Discontinued     Comments:  Administer 4 oz Fruit Juice OR 4 oz Regular Soda OR 8 oz Milk OR 15-30 grams (1 tube) of Glucose Gel.  Recheck Blood Glucose 15 Minutes After Ingestion, Repeat Treatment & Continue to Recheck Blood Sugar Every 15 Minutes Until Blood Glucose is 70 mg/dL or Higher.  Once Blood Glucose is 70 mg/dL or Higher and if It Will Be more than 60 Minutes Until the Next Meal, Provide Appropriate Snack (Including Carbohydrate Food) Based on Meal Plan Order. Give Meal Tray As Soon As Possible.    03/18/19 0100 03/18/19 0101  Hypoglycemia Treatment - Patient Has IV Access - Unresponsive, NPO or Unable To Safely Swallow  Until Discontinued     Comments:  Administer 25g (50ml) D50W IV Push.  Recheck Blood Glucose 15 Minutes After Administration, if Blood Glucose Remains Less Than 70 mg/dl, Repeat Treatment   Recheck Blood Glucose 15 Minutes After 2nd Administration, if Blood Glucose Remains Less Than 70 mg/dL After 2nd Dose of D50W, Contact Provider for Further Treatment Orders & Consider Adding IVF With D5W for Maintenance    03/18/19 0100 03/18/19 0101  Hypoglycemia Treatment - Patient Without IV Access - Unresponsive, NPO or Unable To Safely Swallow  Until Discontinued     Comments:  Administer 1mg Glucagon SQ & Establish IV Access.  Turn Patient on Side - Nausea / Vomiting May Occur.  Recheck Blood Glucose 15 Minutes After Administration.  If Blood Glucose Remains Less Than 70, Administer 25g D50W IV Push (50ml).  Recheck Blood Glucose 15 Minutes After Administration of D50W, if Blood Glucose Remains Less Than 70 mg/dl, Contact Provider for Further Treatment Orders & Consider Adding IVF With D5 for Maintenance    03/18/19 0100 03/18/19 0101  Notify Provider of event. Communicate needs and document in EMR.  Once      03/18/19 0100 03/18/19 0101  Document event and patients response to treatment in EMR, any medications given to be documented on  MAR.  Once      03/18/19 0100    03/18/19 0101  Hemoglobin A1c  Once      03/18/19 0100    03/18/19 0100  HYDROmorphone (DILAUDID) injection 0.5 mg  Every 2 Hours PRN      03/18/19 0100    03/18/19 0100  LORazepam (ATIVAN) injection 0.5 mg  Every 12 Hours PRN      03/18/19 0100    03/18/19 0100  dextrose (GLUTOSE) oral gel 15 g  Every 15 Minutes PRN      03/18/19 0100    03/18/19 0100  dextrose (D50W) 25 g/ 50mL Intravenous Solution 25 g  Every 15 Minutes PRN      03/18/19 0100    03/18/19 0100  glucagon (human recombinant) (GLUCAGEN DIAGNOSTIC) injection 1 mg  As Needed      03/18/19 0100    03/18/19 0026  Lactic Acid, Reflex Timer (This will reflex a repeat order 3-3:15 hours after ordered.)  Once      03/18/19 0025    03/18/19 0022  NIPPV (CPAP or BIPAP)  Until Discontinued      03/18/19 0021    03/18/19 0018  Inpatient Admission  Once      03/18/19 0018    03/18/19 0012  Place Sequential Compression Device  Once      03/18/19 0011    03/17/19 2356  Code Status and Medical Interventions:  Continuous      03/18/19 0001    03/17/19 2324  Med / Surg Bed Request  Once      03/17/19 2323    03/17/19 2308  piperacillin-tazobactam (ZOSYN) 4.5 g in iso-osmotic dextrose 100 mL IVPB (premix)  Once      03/17/19 2306    03/17/19 2308  HYDROmorphone (DILAUDID) injection 1 mg  Once      03/17/19 2306    03/17/19 2307  Blood Culture - Blood,  Once      03/17/19 2306    03/17/19 2307  Blood Culture - Blood,  Once      03/17/19 2306    03/17/19 2307  Lactic Acid, Plasma  Once      03/17/19 2306    03/17/19 2307  Procalcitonin  Once      03/17/19 2306    03/17/19 2146  iopamidol (ISOVUE-300) 61 % injection 100 mL  Once in Imaging      03/17/19 2144    03/17/19 2033  Morphine sulfate (PF) injection 4 mg  Once      03/17/19 2031 03/17/19 2032  sodium chloride 0.9 % bolus 1,000 mL  Once      03/17/19 2030 03/17/19 2032  ondansetron (ZOFRAN) injection 4 mg  Once      03/17/19 2030 03/17/19 2030  CBC & Differential  Once       03/17/19 2030 03/17/19 2030  Comprehensive Metabolic Panel  Once      03/17/19 2030 03/17/19 2030  Lipase  Once      03/17/19 2030 03/17/19 2030  Urinalysis With Microscopic If Indicated (No Culture) - Urine, Clean Catch  Once      03/17/19 2030 03/17/19 2030  Insert peripheral IV  Once      03/17/19 2030 03/17/19 2030  CT Abdomen Pelvis With Contrast  1 Time Imaging     Comments:  IV CONTRAST ONLY      03/17/19 2030 03/17/19 2030  CBC Auto Differential  PROCEDURE ONCE      03/17/19 2030 03/17/19 2029  sodium chloride 0.9 % flush 10 mL  As Needed      03/17/19 2030    --  insulin lispro (humaLOG) 100 UNIT/ML injection  2 Times Daily      03/17/19 2034    --  traZODone (DESYREL) 150 MG tablet  Nightly      03/17/19 2034    --  propranolol (INDERAL) 10 MG tablet  Daily      03/17/19 2034    --  clonazePAM (KlonoPIN) 0.5 MG tablet  2 Times Daily      03/17/19 2034    Signed and Held  clonazePAM (KlonoPIN) tablet 0.5 mg  2 Times Daily,   Status:  Canceled      Signed and Held    Signed and Held  divalproex (DEPAKOTE) DR tablet 500 mg  Every 12 Hours Scheduled,   Status:  Canceled      Signed and Held    Signed and Held  DULoxetine (CYMBALTA) DR capsule 60 mg  2 Times Daily,   Status:  Canceled      Signed and Held    Signed and Held  ferrous sulfate tablet 325 mg  Daily With Breakfast,   Status:  Canceled      Signed and Held    Signed and Held  propranolol (INDERAL) tablet 10 mg  Daily,   Status:  Canceled      Signed and Held    Signed and Held  traZODone (DESYREL) tablet 150 mg  Nightly,   Status:  Canceled      Signed and Held

## 2019-03-19 LAB
ANION GAP SERPL CALCULATED.3IONS-SCNC: 7 MMOL/L (ref 3–11)
BUN BLD-MCNC: 9 MG/DL (ref 9–23)
BUN/CREAT SERPL: 8.8 (ref 7–25)
CALCIUM SPEC-SCNC: 9.4 MG/DL (ref 8.7–10.4)
CHLORIDE SERPL-SCNC: 104 MMOL/L (ref 99–109)
CO2 SERPL-SCNC: 26 MMOL/L (ref 20–31)
CREAT BLD-MCNC: 1.02 MG/DL (ref 0.6–1.3)
D-LACTATE SERPL-SCNC: 1.2 MMOL/L (ref 0.5–2)
DEPRECATED RDW RBC AUTO: 50.7 FL (ref 37–54)
ERYTHROCYTE [DISTWIDTH] IN BLOOD BY AUTOMATED COUNT: 14.1 % (ref 11.3–14.5)
GFR SERPL CREATININE-BSD FRML MDRD: 79 ML/MIN/1.73
GLUCOSE BLD-MCNC: 139 MG/DL (ref 70–100)
GLUCOSE BLDC GLUCOMTR-MCNC: 128 MG/DL (ref 70–130)
GLUCOSE BLDC GLUCOMTR-MCNC: 157 MG/DL (ref 70–130)
GLUCOSE BLDC GLUCOMTR-MCNC: 174 MG/DL (ref 70–130)
GLUCOSE BLDC GLUCOMTR-MCNC: 177 MG/DL (ref 70–130)
HCT VFR BLD AUTO: 40.3 % (ref 38.9–50.9)
HGB BLD-MCNC: 13.4 G/DL (ref 13.1–17.5)
MAGNESIUM SERPL-MCNC: 1.6 MG/DL (ref 1.3–2.7)
MCH RBC QN AUTO: 32.7 PG (ref 27–31)
MCHC RBC AUTO-ENTMCNC: 33.3 G/DL (ref 32–36)
MCV RBC AUTO: 98.3 FL (ref 80–99)
PLATELET # BLD AUTO: 138 10*3/MM3 (ref 150–450)
PMV BLD AUTO: 10.9 FL (ref 6–12)
POTASSIUM BLD-SCNC: 4.4 MMOL/L (ref 3.5–5.5)
RBC # BLD AUTO: 4.1 10*6/MM3 (ref 4.2–5.76)
SODIUM BLD-SCNC: 137 MMOL/L (ref 132–146)
WBC NRBC COR # BLD: 15.53 10*3/MM3 (ref 3.5–10.8)

## 2019-03-19 PROCEDURE — G0108 DIAB MANAGE TRN  PER INDIV: HCPCS

## 2019-03-19 PROCEDURE — 83735 ASSAY OF MAGNESIUM: CPT | Performed by: INTERNAL MEDICINE

## 2019-03-19 PROCEDURE — 83605 ASSAY OF LACTIC ACID: CPT | Performed by: INTERNAL MEDICINE

## 2019-03-19 PROCEDURE — 82962 GLUCOSE BLOOD TEST: CPT

## 2019-03-19 PROCEDURE — 25010000002 ERTAPENEM 1 GM/100ML SOLUTION: Performed by: INTERNAL MEDICINE

## 2019-03-19 PROCEDURE — 85027 COMPLETE CBC AUTOMATED: CPT | Performed by: INTERNAL MEDICINE

## 2019-03-19 PROCEDURE — 25010000002 HYDROMORPHONE 1 MG/ML SOLUTION: Performed by: INTERNAL MEDICINE

## 2019-03-19 PROCEDURE — 99233 SBSQ HOSP IP/OBS HIGH 50: CPT | Performed by: HOSPITALIST

## 2019-03-19 PROCEDURE — 80048 BASIC METABOLIC PNL TOTAL CA: CPT | Performed by: INTERNAL MEDICINE

## 2019-03-19 PROCEDURE — 25010000002 PIPERACILLIN SOD-TAZOBACTAM PER 1 G: Performed by: HOSPITALIST

## 2019-03-19 PROCEDURE — 25010000002 MORPHINE PER 10 MG: Performed by: SURGERY

## 2019-03-19 RX ORDER — TRAZODONE HYDROCHLORIDE 50 MG/1
50 TABLET ORAL NIGHTLY
Status: DISCONTINUED | OUTPATIENT
Start: 2019-03-19 | End: 2019-03-20 | Stop reason: HOSPADM

## 2019-03-19 RX ORDER — MORPHINE SULFATE 4 MG/ML
4 INJECTION, SOLUTION INTRAMUSCULAR; INTRAVENOUS EVERY 4 HOURS PRN
Status: DISCONTINUED | OUTPATIENT
Start: 2019-03-19 | End: 2019-03-20 | Stop reason: HOSPADM

## 2019-03-19 RX ORDER — MAGNESIUM SULFATE HEPTAHYDRATE 40 MG/ML
2 INJECTION, SOLUTION INTRAVENOUS AS NEEDED
Status: DISCONTINUED | OUTPATIENT
Start: 2019-03-19 | End: 2019-03-20 | Stop reason: HOSPADM

## 2019-03-19 RX ORDER — ACETAMINOPHEN 325 MG/1
650 TABLET ORAL EVERY 6 HOURS PRN
Status: DISCONTINUED | OUTPATIENT
Start: 2019-03-19 | End: 2019-03-20 | Stop reason: HOSPADM

## 2019-03-19 RX ORDER — HYDROCODONE BITARTRATE AND ACETAMINOPHEN 5; 325 MG/1; MG/1
1 TABLET ORAL EVERY 8 HOURS PRN
Status: DISCONTINUED | OUTPATIENT
Start: 2019-03-19 | End: 2019-03-20 | Stop reason: HOSPADM

## 2019-03-19 RX ORDER — BUTALBITAL, ACETAMINOPHEN AND CAFFEINE 50; 325; 40 MG/1; MG/1; MG/1
1 TABLET ORAL ONCE
Status: COMPLETED | OUTPATIENT
Start: 2019-03-19 | End: 2019-03-19

## 2019-03-19 RX ORDER — MAGNESIUM SULFATE HEPTAHYDRATE 40 MG/ML
4 INJECTION, SOLUTION INTRAVENOUS AS NEEDED
Status: DISCONTINUED | OUTPATIENT
Start: 2019-03-19 | End: 2019-03-20 | Stop reason: HOSPADM

## 2019-03-19 RX ADMIN — ERTAPENEM SODIUM 1 G: 1 INJECTION, POWDER, LYOPHILIZED, FOR SOLUTION INTRAMUSCULAR; INTRAVENOUS at 08:25

## 2019-03-19 RX ADMIN — FAMOTIDINE 20 MG: 20 TABLET ORAL at 08:07

## 2019-03-19 RX ADMIN — BUTALBITAL, ACETAMINOPHEN AND CAFFEINE 1 TABLET: 50; 325; 40 TABLET ORAL at 15:47

## 2019-03-19 RX ADMIN — DIVALPROEX SODIUM 500 MG: 500 TABLET, DELAYED RELEASE ORAL at 08:07

## 2019-03-19 RX ADMIN — FAMOTIDINE 20 MG: 20 TABLET ORAL at 20:05

## 2019-03-19 RX ADMIN — INSULIN HUMAN 2 UNITS: 100 INJECTION, SOLUTION PARENTERAL at 17:24

## 2019-03-19 RX ADMIN — HYDROMORPHONE HYDROCHLORIDE 1 MG: 1 INJECTION, SOLUTION INTRAMUSCULAR; INTRAVENOUS; SUBCUTANEOUS at 09:52

## 2019-03-19 RX ADMIN — SODIUM CHLORIDE 125 ML/HR: 9 INJECTION, SOLUTION INTRAVENOUS at 07:59

## 2019-03-19 RX ADMIN — DULOXETINE HYDROCHLORIDE 60 MG: 60 CAPSULE, DELAYED RELEASE ORAL at 08:07

## 2019-03-19 RX ADMIN — HYDROCODONE BITARTRATE AND ACETAMINOPHEN 1 TABLET: 5; 325 TABLET ORAL at 12:11

## 2019-03-19 RX ADMIN — ACETAMINOPHEN 650 MG: 325 TABLET ORAL at 13:38

## 2019-03-19 RX ADMIN — TRAZODONE HYDROCHLORIDE 50 MG: 50 TABLET ORAL at 22:39

## 2019-03-19 RX ADMIN — MAGNESIUM SULFATE IN WATER 4 G: 40 INJECTION, SOLUTION INTRAVENOUS at 12:14

## 2019-03-19 RX ADMIN — DULOXETINE HYDROCHLORIDE 60 MG: 60 CAPSULE, DELAYED RELEASE ORAL at 20:05

## 2019-03-19 RX ADMIN — INSULIN HUMAN 2 UNITS: 100 INJECTION, SOLUTION PARENTERAL at 12:08

## 2019-03-19 RX ADMIN — TAZOBACTAM SODIUM AND PIPERACILLIN SODIUM 3.38 G: 375; 3 INJECTION, SOLUTION INTRAVENOUS at 05:16

## 2019-03-19 RX ADMIN — DIVALPROEX SODIUM 500 MG: 500 TABLET, DELAYED RELEASE ORAL at 20:05

## 2019-03-19 RX ADMIN — MORPHINE SULFATE 4 MG: 4 INJECTION INTRAVENOUS at 13:39

## 2019-03-19 RX ADMIN — POLYETHYLENE GLYCOL 3350 17 G: 17 POWDER, FOR SOLUTION ORAL at 15:47

## 2019-03-19 RX ADMIN — INSULIN HUMAN 0 UNITS: 100 INJECTION, SOLUTION PARENTERAL at 05:16

## 2019-03-19 RX ADMIN — HYDROCODONE BITARTRATE AND ACETAMINOPHEN 1 TABLET: 5; 325 TABLET ORAL at 20:05

## 2019-03-19 RX ADMIN — SODIUM CHLORIDE 125 ML/HR: 9 INJECTION, SOLUTION INTRAVENOUS at 20:06

## 2019-03-19 RX ADMIN — HYDROCODONE BITARTRATE AND ACETAMINOPHEN 1 TABLET: 5; 325 TABLET ORAL at 05:16

## 2019-03-19 RX ADMIN — HYDROMORPHONE HYDROCHLORIDE 1 MG: 1 INJECTION, SOLUTION INTRAMUSCULAR; INTRAVENOUS; SUBCUTANEOUS at 01:02

## 2019-03-19 RX ADMIN — CLONAZEPAM 0.5 MG: 0.5 TABLET ORAL at 08:07

## 2019-03-19 RX ADMIN — SODIUM CHLORIDE, PRESERVATIVE FREE 10 ML: 5 INJECTION INTRAVENOUS at 20:06

## 2019-03-19 RX ADMIN — HYDROMORPHONE HYDROCHLORIDE 1 MG: 1 INJECTION, SOLUTION INTRAMUSCULAR; INTRAVENOUS; SUBCUTANEOUS at 07:48

## 2019-03-19 RX ADMIN — PROPRANOLOL HYDROCHLORIDE 10 MG: 10 TABLET ORAL at 08:07

## 2019-03-19 RX ADMIN — CLONAZEPAM 0.5 MG: 0.5 TABLET ORAL at 20:49

## 2019-03-19 NOTE — PROGRESS NOTES
"    [facility] Medicine Services  PROGRESS NOTE    Patient Name: Joshua Power  : 1975  MRN: [id number]    Date of Admission: 3/17/2019  Length of Stay: 1  Primary Care Physician: Riky Saenz MD    Subjective   Subjective     CC:  Abd pain    HPI:  Has chronic pain syndrome per H and P.  Reported headache on assessment today.  Asked by Floor Nurse Mgr to talk to patient later today about pain mgmt.  Called patient in room today.  He was angry and did not seem to understand or appreciate concerns about apnea.  He says it was due to \"what's going on out there\" with pain medication.    Threatened leaving AMA yesterday per Dr. Humphreys's progress note.    Review of Systems   Gen-  Mouth dry, has headache.  Has been up to BR today and is less dizzy than yest.   CV- No chest pain, palpitations  Resp- No cough, dyspnea.  Has CONSTANTIN but doesn't use CPAP at home - \"Its my fault, I know.\"  Endo - DM since age 35, no neuropathy or retinopathy, rarely sees MD        Otherwise ROS is negative except as mentioned in the HPI.    Objective   Objective     Vital Signs:   Temp:  [97.6 °F (36.4 °C)-99.9 °F (37.7 °C)] 98.1 °F (36.7 °C)  Heart Rate:  [] 86  Resp:  [16-18] 16  BP: (126-139)/(73-90) 136/84        Physical Exam:  Constitutional: Awake, alert, pleasant NAD  Eyes: PERRLA, sclerae anicteric, no conjunctival injection  HENT: NCAT, mucous membranes dry  Neck: Supple, no thyromegaly, no lymphadenopathy, trachea midline  Respiratory: Clear to auscultation bilaterally, nonlabored respirations   Cardiovascular: RRR, no murmurs, rubs, or gallops, palpable pedal pulses bilaterally  Gastrointestinal: pos BS.  Tender BLQ.  Severe rebound pain.  Abd soft.    Musculoskeletal: No bilateral ankle edema, no clubbing or cyanosis to extremities  Psychiatric: Appropriate affect, cooperative  Neurologic: Oriented x 3, strength symmetric in all extremities, Cranial Nerves grossly intact to confrontation, " speech clear  Skin: No rashes    Results Reviewed:  I have personally reviewed current lab, radiology, and data and agree.    Results from last 7 days   Lab Units 03/19/19  0517 03/18/19 0323 03/17/19 2042   WBC 10*3/mm3 15.53* 15.34* 14.09*   HEMOGLOBIN g/dL 13.4 12.8* 15.6   HEMATOCRIT % 40.3 37.9* 44.6   PLATELETS 10*3/mm3 138* 130* 179     Results from last 7 days   Lab Units 03/19/19  0517 03/18/19 0323 03/17/19 2042   SODIUM mmol/L 137 139 132   POTASSIUM mmol/L 4.4 3.7 5.5   CHLORIDE mmol/L 104 104 98*   CO2 mmol/L 26.0 26.0 23.0   BUN mg/dL 9 14 15   CREATININE mg/dL 1.02 0.97 1.13   GLUCOSE mg/dL 139* 153* 253*   CALCIUM mg/dL 9.4 8.1* 8.8   ALT (SGPT) U/L  --   --  53*   AST (SGOT) U/L  --   --  137*     Estimated Creatinine Clearance: 103.1 mL/min (by C-G formula based on SCr of 1.02 mg/dL).    No results found for: BNP    Microbiology Results Abnormal     Procedure Component Value - Date/Time    Blood Culture - Blood, Arm, Left [[id number]] Collected:  03/17/19 2325    Lab Status:  Preliminary result Specimen:  Blood from Arm, Left Updated:  03/19/19 0000     Blood Culture No growth at 24 hours    Blood Culture - Blood, Arm, Left [[id number]] Collected:  03/17/19 2325    Lab Status:  Preliminary result Specimen:  Blood from Arm, Left Updated:  03/19/19 0000     Blood Culture No growth at 24 hours        Imaging Results (last 24 hours)     ** No results found for the last 24 hours. **        I have reviewed the medications:    Current Facility-Administered Medications:   •  ! Home medication [facility], , Does not apply, Q12H, Jose Rush, [facility], Stopped at 03/18/19 2110  •  acetaminophen (TYLENOL) tablet 650 mg, 650 mg, Oral, Q6H PRN, Atiya Hernandez, APRN, 650 mg at 03/19/19 1338  •  clonazePAM (KlonoPIN) tablet 0.5 mg, 0.5 mg, Oral, BID, Vivi Humphreys MD, 0.5 mg at 03/19/19 0807  •  dextrose (D50W) 25 g/ 50mL Intravenous Solution 25 g, 25 g, Intravenous, Q15 Min PRN,  Bonny Putnam MD  •  dextrose (GLUTOSE) oral gel 15 g, 15 g, Oral, Q15 Min PRN, Bonny Putnam MD  •  divalproex (DEPAKOTE) DR tablet 500 mg, 500 mg, Oral, Q12H, Vivi Humphreys MD, 500 mg at 03/19/19 0807  •  DULoxetine (CYMBALTA) DR capsule 60 mg, 60 mg, Oral, BID, Vivi Humphreys MD, 60 mg at 03/19/19 0807  •  ertapenem (INVanz) 1 g/100 mL 0.9% NS VTB (mbp), 1 g, Intravenous, Q24H, Amador Vega MD, 1 g at 03/19/19 0825  •  famotidine (PEPCID) tablet 20 mg, 20 mg, Oral, BID, Marco A Dumont MD, 20 mg at 03/19/19 0807  •  glucagon (human recombinant) (GLUCAGEN DIAGNOSTIC) injection 1 mg, 1 mg, Subcutaneous, PRN, Bonny Putnam MD  •  HYDROcodone-acetaminophen (NORCO) 5-325 MG per tablet 1 tablet, 1 tablet, Oral, Q8H PRN, Chaim Thakur MD  •  insulin regular (humuLIN R,novoLIN R) injection 0-7 Units, 0-7 Units, Subcutaneous, Q6H, Gladis Bhakta, APRN, 2 Units at 03/19/19 1208  •  LORazepam (ATIVAN) injection 0.5 mg, 0.5 mg, Intravenous, Q12H PRN, Bonny Putnam MD, 0.5 mg at 03/18/19 1732  •  Magnesium Sulfate 2 gram Bolus, followed by 8 gram infusion (total Mg dose 10 grams)- Mg less than or equal to 1mg/dL, 2 g, Intravenous, PRN **OR** Magnesium Sulfate 2 gram / 50mL Infusion (GIVE X 3 BAGS TO EQUAL 6GM TOTAL DOSE) - Mg 1.1 - 1.5 mg/dl, 2 g, Intravenous, PRN **OR** Magnesium Sulfate 4 gram infusion- Mg 1.6-1.9 mg/dL, 4 g, Intravenous, PRN, Hernandez, Atiya R, APRN, Last Rate: 25 mL/hr at 03/19/19 1214, 4 g at 03/19/19 1214  •  methylnaltrexone (RELISTOR) injection 6 mg, 6 mg, Subcutaneous, Every Other Day, Marco A Dumont MD, 6 mg at 03/18/19 1652  •  Morphine sulfate (PF) injection 4 mg, 4 mg, Intravenous, Q4H PRN, Marco A Dumont MD, 4 mg at 03/19/19 1339  •  polyethylene glycol 3350 powder (packet), 17 g, Oral, Daily, Marco A Dumont MD, 17 g at 03/19/19 1547  •  propranolol (INDERAL) tablet 10 mg, 10 mg, Oral, Daily, Vivi Humphreys MD, 10  mg at 03/19/19 0807  •  [COMPLETED] Insert peripheral IV, , , Once **AND** sodium chloride 0.9 % flush 10 mL, 10 mL, Intravenous, PRN, Leidy Osborne APRN  •  sodium chloride 0.9 % flush 10 mL, 10 mL, Intravenous, Q12H, Fior Ferrell APRN  •  sodium chloride 0.9 % flush 10 mL, 10 mL, Intravenous, PRN, Fior Ferrell APRN  •  sodium chloride 0.9 % flush 20 mL, 20 mL, Intravenous, PRN, Fior Ferrell APRN  •  sodium chloride 0.9 % infusion, 125 mL/hr, Intravenous, Continuous, Bonny Putnam MD, Last Rate: 125 mL/hr at 03/19/19 0759, 125 mL/hr at 03/19/19 0759  •  traZODone (DESYREL) tablet 50 mg, 50 mg, Oral, Nightly, Chaim Thakur MD      Assessment/Plan   Assessment / Plan     Active Hospital Problems    Diagnosis Date Noted   • Diverticulitis of colon with perforation [K57.20] 03/18/2019   • HTN (hypertension) [I10] 03/18/2019   • Type 2 diabetes mellitus (CMS/HCC) [E11.9] 03/18/2019   • Anemia [D64.9] 03/18/2019   • Perforated diverticulum [K57.80] 03/18/2019   • CONSTANTIN (obstructive sleep apnea) [G47.33] 03/18/2019   • Depression [F32.9] 03/18/2019   • Chronic pain syndrome [G89.4] 03/18/2019   • Dehydration [E86.0] 03/18/2019   • Lactic acidosis [E87.2] 03/18/2019     Brief Hospital Course to date:  Joshua Power is a 44 y.o. male with history of CONSTANTIN, HTN, depression, IDDM2, and chronic pain syndrome, admitted with complicated recurrent diverticulitis.  First episode 7 months ago, treated at [facility].     Complicated Diverticulitis  - abx per ID  - ID and Gen Surg consulted  - Lactic acidosis:  Fluids - give additional bolus    DM2  - normally takes Humalog 26 units BID  - 8.8 A1c  - basal/SSI.    LFTs elev  - denies Etoh use  - liver, spleen and GB nl on CT    Anxiety, chronic benzos  - continue home meds when able to take po.   - Klonopin 0.5 mg BID standing / scheduled    Untreated Sleep Apnea (likely Mixed)  - chronic pain syndrome  - Obstructive + Central apnea (side effect of  medications)  - suggested patient use machine - acted like he did not know it was there today  - on narcotic analgesia + Klonopin (benzodiazapine) standing    Desperately needs CPAP/BiPAP therapy for sleep  Started on Opioid Antagonist (Relistor)    Currently on IV morphine, oral Norco, trazodone, and Klonopin (schedule)    Fiorcet ordered today for headache - seems pain has been a issue daily    DVT Prophylaxis:  SCDs    Disposition: I expect the patient to be discharged home soon    CODE STATUS:   Code Status and Medical Interventions:   Ordered at: 03/18/19 0001     Code Status:    CPR     Medical Interventions (Level of Support Prior to Arrest):    Full     Electronically signed by Chaim Thakur MD, 03/19/19, 4:45 PM.

## 2019-03-19 NOTE — NURSING NOTE
"Patient presented to nurses station asking for someone to talk to him about leaving AMA. Pt was instructed by PAIGE that charge nurse was in another room and that she would be in shortly. He stated he wanted to speak with nurse manager but instructed she was gone for the day. Pt proceeded to yell across nurses station at RN \"Why do you have such an attitude with me right now, you're a nasty SOB\" Nurse had not spoke or looked in patients direction. Instructed that nurse had not even addressed patient, however patient became more volatile and instructed by staff to return to room.   House  notified and instructed to contact Dr Thakur. Dr Thakur notified and transferred to patients room, security also on floor. Pt is audibly upset on the phone with the MD.   "

## 2019-03-19 NOTE — PROGRESS NOTES
Continued Stay Note  [facility]     Patient Name: Joshua Power  MRN: [id number]  Today's Date: 3/19/2019    Admit Date: 3/17/2019    Discharge Plan     Row Name 03/19/19 1113       Plan    Plan  Home    Plan Comments  I spoke with [facility], where patient has gotten his CPAP and equipment. Patient had told me not using,  as machine id not fitting well. Darvin will have a follow up call and work with patient when he is discharged to resolve the issue.             Discharge Codes    No documentation.             Ingris Garrison RN

## 2019-03-19 NOTE — PROGRESS NOTES
"General Surgery Daily Progress Note    Subjective:  Feeling about the same.    Objective:  /90   Pulse 91   Temp 98.3 °F (36.8 °C)   Resp 18   Ht 175.3 cm (69\")   Wt 91.2 kg (201 lb 2 oz)   SpO2 97%   BMI 29.70 kg/m²       General Appearance: NAD  Eyes: Anicteric  Neck: Trachea midline   Cardiovascular:  RRR without murmur nor rub  Lungs:  Bilateral respirations unlabored   Abdomen:  Soft, tender in the left lower quadrant, no generalized peritonitis  Extremities:  No cyanosis or edema   Skin:  No obvious rashes   Neurologic: awake and conversant       Imaging Results (last 24 hours)     ** No results found for the last 24 hours. **          CBC:  Results from last 7 days   Lab Units 03/19/19  0517   WBC 10*3/mm3 15.53*   HEMOGLOBIN g/dL 13.4   HEMATOCRIT % 40.3   PLATELETS 10*3/mm3 138*       CMP:  Results from last 7 days   Lab Units 03/19/19  0517  03/17/19  2042   SODIUM mmol/L 137   < > 132   POTASSIUM mmol/L 4.4   < > 5.5   CHLORIDE mmol/L 104   < > 98*   CO2 mmol/L 26.0   < > 23.0   BUN mg/dL 9   < > 15   CREATININE mg/dL 1.02   < > 1.13   CALCIUM mg/dL 9.4   < > 8.8   BILIRUBIN mg/dL  --   --  0.5   ALK PHOS U/L  --   --  47   ALT (SGPT) U/L  --   --  53*   AST (SGOT) U/L  --   --  137*   GLUCOSE mg/dL 139*   < > 253*    < > = values in this interval not displayed.         Assessment:  Perforated colonic diverticular disease    Plan:   Clears  Miralax  IV antibiotics.  I discussed the case directly with Dr. Vega, ID.    Marco A Dumont MD - 3/19/2019, 12:42 PM        "

## 2019-03-19 NOTE — PAYOR COMM NOTE
"Eva Yi RN Utilization Review [phone]  Fax # [phone]    Notification of admission faxed in on 3/18/19.  Authorization still pending.  Additional clinicals faxed. Please call or fax with authorization.         Joshua Nielson (44 y.o. Male)     Date of Birth Social Security Number Address Home Phone MRN    1975  [address]  [address]  [place] [id number] [phone] [id number]    [employer] Marital Status          None        Admission Date Admission Type Admitting Provider Attending Provider Department, Room/Bed    3/17/19 Emergency Chaim Thakur MD Schnell, Aaron, MD [facility] 5B, N538/1    Discharge Date Discharge Disposition Discharge Destination                       Attending Provider:  Chaim Thakur MD    Allergies:  Nsaids    Isolation:  None   Infection:  None   Code Status:  CPR    Ht:  175.3 cm (69\")   Wt:  91.2 kg (201 lb 2 oz)    Admission Cmt:  None   Principal Problem:  None                Active Insurance as of 3/17/2019     Primary Coverage     Payor Plan Insurance Group Employer/Plan Group    [employer]HE[place] MEDICAID [place] MEDICAID [id number]     Payor Plan Address Payor Plan Phone Number Payor Plan Fax Number Effective Dates    [address] [address] [phone]  10/1/2016 - None Entered    [facility] [id number]       Subscriber Name Subscriber Birth Date Member ID       JOSHUA NIELSON 1975 [id number]                 Emergency Contacts      (Rel.) Home Phone Work Phone Mobile Phone    Sana Nielson (Mother) [phone] -- [phone]               History & Physical      Bonny Putnam MD at 3/18/2019 12:08 AM              [facility]  HISTORY AND PHYSICAL    Patient Name: Joshua Nielson  : 1975  MRN: [id number]  Primary Care Physician: Riky Saenz MD  Date of admission: 3/17/2019      Subjective   Subjective     Chief Complaint:  Abd " "pain    HPI:  Joshua Power is a 44 y.o. male with history of CONSTANTIN, HTN, depression, uncontrolled IDDM2, and chronic pain syndrome, who presented to the ED with sudden onset of sharp bilat pelvic pain, that radiated to the groin, which started about 1 hour prior to presenting to the ED. Pt denies any prior abd pain or fever/chills prior to today, but while he was in the ED, he reported some hot and cold flashes. No N/V/D/C. No aggravating or relieving factors. No hematochezia or melena. No dysuria. Currently denies any other symptoms, except extreme thirst, but he also reports chronic polydipsia/polyuria.     Of note, pt reported that he's had 1 prior episode of diverticulitis in 8/2018, which he was treated at . He stated that there were no perforation, but \"doctors told me my colon was very angry and that I needed to have surgery, but the next day, came back and said we'll try some liquid first.\" Pt was sent home after about 3 days with abx. He did not have a f/u colonoscopy after his last bout of diverticulitis, but stated that he did have a colonoscopy about 5 years ago in [place], but he's not sure why, poss due to h/o anemia.      W/U in the ED c/w acute diverticulitis with perforation. Dr Dumont from gen surgery consulted by ED provided. Pt will be admitted for further care under hospitalists' service.      Review of Systems   Otherwise complete ROS reviewed and is negative except as mentioned in the HPI.    Personal History     Past Medical History:   Diagnosis Date   • Anemia    • Arthritis    • Diabetes mellitus (CMS/HCC)    • DISH (diffuse idiopathic skeletal hyperostosis)    • Headache    • Hypertension    • Injury of back        History reviewed. No pertinent surgical history.    Family History: family history includes Diabetes in his father; Kidney failure in his father; Stroke in his father. Otherwise pertinent FHx was reviewed and unremarkable.     Social History:  reports that  has never smoked. he " has never used smokeless tobacco. He reports that he does not drink alcohol or use drugs.  Social History     Social History Narrative   • Not on file       Medications:    Available home medication information reviewed.      Allergies   Allergen Reactions   • Nsaids GI Bleeding       Objective   Objective     Vital Signs:   Temp:  [97.4 °F (36.3 °C)-99 °F (37.2 °C)] 99 °F (37.2 °C)  Heart Rate:  [] 105  Resp:  [16-18] 18  BP: (122-143)/(79-99) 140/80        Physical Exam   General Assessment: No acute cardiopulmonary distress. Well developed and well nourished.    HEENT: NCAT, PERRL, lips/MM very dry    Neck: Supple    CVS: RRR, S1S2 normal, no murmurs    Resp: CTAB, no adventitious sound    Abd: soft, tender over bilat lower quadrants, mildly distended, normal BS, + voluntary guarding, but peritoneal signs    Ext: No edema, both calves are symmetric and NTTP    Neuro: Face symmetric, speech clear, ROMERO    Skin: W/D/I. No rash.    Psych: Affect is appropriate        Results Reviewed:  I have personally reviewed current lab, radiology, and data and agree.    Results from last 7 days   Lab Units 03/17/19  2042   WBC 10*3/mm3 14.09*   HEMOGLOBIN g/dL 15.6   HEMATOCRIT % 44.6   PLATELETS 10*3/mm3 179     Results from last 7 days   Lab Units 03/17/19  2042   SODIUM mmol/L 132   POTASSIUM mmol/L 5.5   CHLORIDE mmol/L 98*   CO2 mmol/L 23.0   BUN mg/dL 15   CREATININE mg/dL 1.13   GLUCOSE mg/dL 253*   CALCIUM mg/dL 8.8   ALT (SGPT) U/L 53*   AST (SGOT) U/L 137*     Estimated Creatinine Clearance: 93.1 mL/min (by C-G formula based on SCr of 1.13 mg/dL).  Brief Urine Lab Results  (Last result in the past 365 days)      Color   Clarity   Blood   Leuk Est   Nitrite   Protein   CREAT   Urine HCG        03/17/19 2338 Yellow Clear Negative Negative Negative Negative             No results found for: BNP  Imaging Results (last 24 hours)     Procedure Component Value Units Date/Time    CT Abdomen Pelvis With Contrast  [[id number]] Collected:  03/17/19 2144     Updated:  03/17/19 2242    Narrative:       EXAM:    CT Abdomen and Pelvis With Contrast     EXAM DATE/TIME:    3/17/2019 9:44 PM     CLINICAL HISTORY:    44 years old, male; Pain; Abdominal pain; Localized; Lower; Patient HX: Low   abd pain, diarrhea     TECHNIQUE:    Axial computed tomography images of the abdomen and pelvis with intravenous   contrast.    All CT scans at this facility use at least one of these dose optimization   techniques: automated exposure control; mA and/or kV adjustment per patient   size (includes targeted exams where dose is matched to clinical indication); or   iterative reconstruction.    Coronal reformatted images were created and reviewed.     CONTRAST:    Contrast Material: 100 ml of iso 300; Contrast Route: existing     COMPARISON:    No relevant prior studies available.     FINDINGS:    Lower thorax: No acute findings.     ABDOMEN:    Liver: Normal. No mass.    Gallbladder and bile ducts: Normal. No calcified stones. No ductal dilation.    Pancreas: Normal. No ductal dilation.    Spleen: Normal. No splenomegaly.    Adrenals: Normal. No mass.    Kidneys and ureters: Normal. No hydronephrosis.    Stomach and bowel:  Colonic diverticulosis. Segment of wall thickening at the   junction of the sigmoid and descending colon, consistent in appearance with   acute diverticulitis. There is surrounding fat stranding. There are adjacent   small locules of extraluminal air.    Appendix: A normal appendix is identified.    PELVIS:    Bladder: Unremarkable as visualized.    Reproductive: Unremarkable as visualized.     ABDOMEN and PELVIS:    Intraperitoneal space: See above. No abscess.   Bones/joints: No acute fracture. No dislocation.    Soft tissues: Unremarkable.    Vasculature: Normal. No abdominal aortic aneurysm.    Lymph nodes: Normal. No enlarged lymph nodes.       Impression:       Acute perforated diverticulitis at the junction of the sigmoid  and descending   colon. No abscess.    THIS DOCUMENT HAS BEEN ELECTRONICALLY SIGNED BY JOSE G TREVIZO MD             Assessment/Plan   Assessment / Plan     Active Hospital Problems    Diagnosis Date Noted   • Diverticulitis of colon with perforation [K57.20] 03/18/2019   • HTN (hypertension) [I10] 03/18/2019   • Type 2 diabetes mellitus (CMS/HCC) [E11.9] 03/18/2019   • Anemia [D64.9] 03/18/2019   • Perforated diverticulum [K57.80] 03/18/2019   • CONSTANTIN (obstructive sleep apnea) [G47.33] 03/18/2019   • Depression [F32.9] 03/18/2019   • Chronic pain syndrome [G89.4] 03/18/2019   • Dehydration [E86.0] 03/18/2019   • Lactic acidosis [E87.2] 03/18/2019     Joshua Power is a 44 y.o. male with history of CONSTANTIN, HTN, depression, IDDM2, and chronic pain syndrome, admitted with complicated recurrent diverticulitis-first episode 7 months ago, treated at [facility].    - Hemodynamically stable and nontoxic  - Lactic acid is 3.2 and pt only received 1L NS in the ED. Still appears very dry, so will give anoth 1L NS bolus.  - Surgical consultation obtained by ED provider, awaiting formal eval  - Cont with maintenance IVF after bolus, pain control/supportive measures, and anticipatory care  - Strict I/O  - Holding home meds and sub with IV equivalent when possible  - Insulin for DM2. Pt normally takes Humalog 26 units BID. No A1C on file. Curently hyperglycemic, could be reactive. I'll give 15 units of basal insulin x 1 only since pt will be NPO and add low dose SSI. Accu check q6h while NPO. Check A1C in am.  - CPAP at night      DVT prophylaxis: Mechanical only due to poss need for surgery    CODE STATUS:    Code Status and Medical Interventions:   Ordered at: 03/18/19 0001     Code Status:    CPR     Medical Interventions (Level of Support Prior to Arrest):    Full       Admission Status:  I believe this patient meets INPATIENT status due to the need for care which can only be reasonably provided in an hospital setting such as possible need  for aggressive/expedited ancillary services and/or consultation services, IV medications, close physician monitoring, and/or procedures.  In such, I feel patient’s risk for adverse outcomes and need for care warrant INPATIENT evaluation and predict the patient’s care encounter to likely last beyond 2 midnights.    Electronically signed by Bonny Putnam MD, 03/18/19, 12:08 AM.          Electronically signed by Bonny Putnam MD at 3/18/2019  1:30 AM          Emergency Department Notes      Leidy Osborne, PAYAL at 3/17/2019  8:25 PM          Subjective   Mr. Joshua Power is a 44 y.o. male who presents to the ED with complaints of abdominal pain. He reports that around an hour ago he developed severe lower abdominal pain, which prompted presentation to the ED. He states that the pain is worse on the right side and radiates into groin. His pain has improved since onset and he currently rates his pain an 8/10. He also complains of chills but he denies nausea, vomiting, a fever, dysuria, frequency, and urgency. He has a history of diverticulitis, HTN, and DM. He does not have a history of abdominal surgery. He does not smoke, drink alcohol, or use illicit drugs. No other acute complaints at this time.         History provided by:  Patient  Abdominal Pain   Pain location:  RLQ and LLQ  Pain radiates to:  Groin  Pain severity:  Severe  Duration:  1 hour  Timing:  Constant  Progression:  Improving  Chronicity:  New  Associated symptoms: chills and fatigue    Associated symptoms: no chest pain, no diarrhea, no dysuria, no nausea, no shortness of breath and no vomiting        Review of Systems   Constitutional: Positive for chills and fatigue.   Respiratory: Negative for shortness of breath.    Cardiovascular: Negative for chest pain.   Gastrointestinal: Positive for abdominal pain. Negative for diarrhea, nausea and vomiting.   Genitourinary: Negative for dysuria, frequency and urgency.   Neurological:  Negative for dizziness and weakness.   All other systems reviewed and are negative.      Past Medical History:   Diagnosis Date   • Anemia    • Arthritis    • Diabetes mellitus (CMS/HCC)    • DISH (diffuse idiopathic skeletal hyperostosis)    • Headache    • Hypertension    • Injury of back        Allergies   Allergen Reactions   • Nsaids GI Bleeding       History reviewed. No pertinent surgical history.    Family History   Problem Relation Age of Onset   • Diabetes Father    • Stroke Father    • Kidney failure Father        Social History     Socioeconomic History   • Marital status:      Spouse name: Not on file   • Number of children: Not on file   • Years of education: Not on file   • Highest education level: Not on file   Tobacco Use   • Smoking status: Never Smoker   • Smokeless tobacco: Never Used   Substance and Sexual Activity   • Alcohol use: No   • Drug use: No   • Sexual activity: Defer         Objective   Physical Exam   Constitutional: He is oriented to person, place, and time. He appears well-developed and well-nourished. He is cooperative.  Non-toxic appearance.   Appears uncomfortable   HENT:   Head: Normocephalic and atraumatic.   Mouth/Throat: Oropharynx is clear and moist. No oropharyngeal exudate.   Eyes: Conjunctivae, EOM and lids are normal. Pupils are equal, round, and reactive to light.   Neck: Trachea normal, normal range of motion and full passive range of motion without pain.   Cardiovascular: Regular rhythm, normal heart sounds, intact distal pulses and normal pulses.   Pulmonary/Chest: Effort normal and breath sounds normal. No respiratory distress. He has no decreased breath sounds. He has no wheezes. He has no rhonchi. He has no rales.   Abdominal: Soft. Normal appearance and bowel sounds are normal. There is tenderness in the right lower quadrant and left lower quadrant.   Musculoskeletal: Normal range of motion.   Neurological: He is alert and oriented to person, place, and  time. He has normal strength. No cranial nerve deficit.   Skin: Skin is warm, dry and intact. No rash noted.   Psychiatric: He has a normal mood and affect. His speech is normal and behavior is normal.   Nursing note and vitals reviewed.      Procedures        ED Course  ED Course as of Mar 18 0345   Sun Mar 17, 2019   2143 WBC: (!) 14.09 [KG]   2143 Glucose: (!) 253 [KG]   2144 Lipase: (!) 63 [KG]   2311 Julia Pearson contacted at this time agrees with treatment plan.  Hospitalist to admit the patient. He will see the patient.   [KG]   2322 Spoke with Dr. Montoya, hospitalist, who agrees to admit the patient. -KCG  [AT]      ED Course User Index  [AT] Mikki Johnson  [KG] Leidy Osborne APRN       Recent Results (from the past 24 hour(s))   Comprehensive Metabolic Panel    Collection Time: 03/17/19  8:42 PM   Result Value Ref Range    Glucose 253 (H) 70 - 100 mg/dL    BUN 15 9 - 23 mg/dL    Creatinine 1.13 0.60 - 1.30 mg/dL    Sodium 132 132 - 146 mmol/L    Potassium 5.5 3.5 - 5.5 mmol/L    Chloride 98 (L) 99 - 109 mmol/L    CO2 23.0 20.0 - 31.0 mmol/L    Calcium 8.8 8.7 - 10.4 mg/dL    Total Protein 7.8 5.7 - 8.2 g/dL    Albumin 5.04 (H) 3.20 - 4.80 g/dL    ALT (SGPT) 53 (H) 7 - 40 U/L    AST (SGOT) 137 (H) 0 - 33 U/L    Alkaline Phosphatase 47 25 - 100 U/L    Total Bilirubin 0.5 0.3 - 1.2 mg/dL    eGFR Non African Amer 70 >60 mL/min/1.73    Globulin 2.8 gm/dL    A/G Ratio 1.8 1.5 - 2.5 g/dL    BUN/Creatinine Ratio 13.3 7.0 - 25.0    Anion Gap 11.0 3.0 - 11.0 mmol/L   Lipase    Collection Time: 03/17/19  8:42 PM   Result Value Ref Range    Lipase 63 (H) 6 - 51 U/L   CBC Auto Differential    Collection Time: 03/17/19  8:42 PM   Result Value Ref Range    WBC 14.09 (H) 3.50 - 10.80 10*3/mm3    RBC 4.70 4.20 - 5.76 10*6/mm3    Hemoglobin 15.6 13.1 - 17.5 g/dL    Hematocrit 44.6 38.9 - 50.9 %    MCV 94.9 80.0 - 99.0 fL    MCH 33.2 (H) 27.0 - 31.0 pg    MCHC 35.0 32.0 - 36.0 g/dL    RDW 13.8 11.3 - 14.5 %    RDW-SD  47.7 37.0 - 54.0 fl    MPV 10.8 6.0 - 12.0 fL    Platelets 179 150 - 450 10*3/mm3    Neutrophil % 72.0 (H) 41.0 - 71.0 %    Lymphocyte % 22.0 (L) 24.0 - 44.0 %    Monocyte % 5.5 0.0 - 12.0 %    Eosinophil % 0.4 0.0 - 3.0 %    Basophil % 0.1 0.0 - 1.0 %    Immature Grans % 0.6 0.0 - 0.6 %    Neutrophils, Absolute 10.16 (H) 1.50 - 8.30 10*3/mm3    Lymphocytes, Absolute 3.10 0.60 - 4.80 10*3/mm3    Monocytes, Absolute 0.77 0.00 - 1.00 10*3/mm3    Eosinophils, Absolute 0.05 0.00 - 0.30 10*3/mm3    Basophils, Absolute 0.01 0.00 - 0.20 10*3/mm3    Immature Grans, Absolute 0.08 (H) 0.00 - 0.05 10*3/mm3   Lactic Acid, Plasma    Collection Time: 03/17/19 11:25 PM   Result Value Ref Range    Lactate 3.2 (C) 0.5 - 2.0 mmol/L   Procalcitonin    Collection Time: 03/17/19 11:25 PM   Result Value Ref Range    Procalcitonin <0.05 <=0.25 ng/mL   Lactic Acid, Reflex Timer (This will reflex a repeat order 3-3:15 hours after ordered.)    Collection Time: 03/17/19 11:25 PM   Result Value Ref Range    Extra Tube Hold for add-ons.    Urinalysis With Microscopic If Indicated (No Culture) - Urine, Clean Catch    Collection Time: 03/17/19 11:38 PM   Result Value Ref Range    Color, UA Yellow Yellow, Straw    Appearance, UA Clear Clear    pH, UA 6.5 5.0 - 8.0    Specific Gravity, UA 1.029 1.001 - 1.030    Glucose, UA >=1000 mg/dL (3+) (A) Negative    Ketones, UA Negative Negative    Bilirubin, UA Negative Negative    Blood, UA Negative Negative    Protein, UA Negative Negative    Leuk Esterase, UA Negative Negative    Nitrite, UA Negative Negative    Urobilinogen, UA 0.2 E.U./dL 0.2 - 1.0 E.U./dL   POC Glucose Once    Collection Time: 03/18/19 12:45 AM   Result Value Ref Range    Glucose 200 (H) 70 - 130 mg/dL     Note: In addition to lab results from this visit, the labs listed above may include labs taken at another facility or during a different encounter within the last 24 hours. Please correlate lab times with ED admission and discharge  "times for further clarification of the services performed during this visit.    CT Abdomen Pelvis With Contrast   Final Result   Acute perforated diverticulitis at the junction of the sigmoid and descending    colon. No abscess.      THIS DOCUMENT HAS BEEN ELECTRONICALLY SIGNED BY JOSE G TREVIZO MD        Vitals:    03/17/19 2130 03/18/19 0002 03/18/19 0020 03/18/19 0037   BP: 124/79 122/82 122/81 140/80   BP Location:   Left arm Left arm   Patient Position:   Lying Lying   Pulse: 89 110 107 105   Resp:   16 18   Temp:   98.1 °F (36.7 °C) 99 °F (37.2 °C)   TempSrc:   Oral Oral   SpO2: 98% 96% 96% 97%   Weight:    91.2 kg (201 lb 2 oz)   Height:    175.3 cm (69\")     Medications   sodium chloride 0.9 % flush 10 mL (not administered)   HYDROmorphone (DILAUDID) injection 0.5 mg (not administered)   sodium chloride 0.9 % infusion (125 mL/hr Intravenous New Bag 3/18/19 0135)   dextrose (GLUTOSE) oral gel 15 g (not administered)   dextrose (D50W) 25 g/ 50mL Intravenous Solution 25 g (not administered)   glucagon (human recombinant) (GLUCAGEN DIAGNOSTIC) injection 1 mg (not administered)   LORazepam (ATIVAN) injection 0.5 mg (0.5 mg Intravenous Given 3/18/19 0328)   piperacillin-tazobactam (ZOSYN) 3.375 g in iso-osmotic dextrose 50 ml (premix) (not administered)   insulin lispro (humaLOG) injection 0-7 Units (3 Units Subcutaneous Given 3/18/19 0143)   ! Home medication stored in [facility] (not administered)   sodium chloride 0.9 % bolus 1,000 mL (0 mL Intravenous Stopped 3/17/19 2147)   ondansetron (ZOFRAN) injection 4 mg (4 mg Intravenous Given 3/17/19 2047)   Morphine sulfate (PF) injection 4 mg (4 mg Intravenous Given 3/17/19 2057)   iopamidol (ISOVUE-300) 61 % injection 100 mL (100 mL Intravenous Given 3/17/19 2151)   piperacillin-tazobactam (ZOSYN) 4.5 g in iso-osmotic dextrose 100 mL IVPB (premix) (0 g Intravenous Stopped 3/18/19 0032)   HYDROmorphone (DILAUDID) injection 1 mg (1 mg Intravenous Given 3/17/19 5722) "   insulin detemir (LEVEMIR) injection 15 Units (15 Units Subcutaneous Given 3/18/19 0133)   sodium chloride 0.9 % bolus 1,000 mL (1,000 mL Intravenous New Bag 3/18/19 0135)     ECG/EMG Results (last 24 hours)     ** No results found for the last 24 hours. **        No orders to display                     MDM    Final diagnoses:   Perforated diverticulum       Documentation assistance provided by scribcarlo Johnson.  Information recorded by the scribe was done at my direction and has been verified and validated by me.     Mikki Johnson  03/17/19 2055       Leidy Osborne APRN  03/18/19 0345      Electronically signed by Leidy Osborne APRN at 3/18/2019  3:45 AM       ICU Vital Signs     Row Name 03/19/19 0726 03/19/19 0600 03/19/19 0516 03/19/19 0400 03/19/19 0202       Vitals    Temp  97.6 °F (36.4 °C)  --  99.6 °F (37.6 °C)  --  --    Temp src  --  --  Axillary  --  --    Pulse  92  --  91  --  --    Heart Rate Source  --  --  Monitor  --  --    Resp  18  --  18  --  --    Resp Rate Source  --  --  Visual  --  --    BP  139/75  --  133/81  --  --    BP Location  --  --  Left arm  --  --    BP Method  --  --  Automatic  --  --    Patient Position  --  --  Lying  --  --       Oxygen Therapy    SpO2  98 %  --  98 %  --  --    Device (Oxygen Therapy)  room air  room air  room air  room air  room air    Row Name 03/19/19 0005 03/19/19 0000 03/18/19 2240 03/18/19 1942 03/18/19 1926       Vitals    Temp  99.9 °F (37.7 °C) nurse advised  --  --  --  98.9 °F (37.2 °C)    Temp src  Oral  --  --  --  Oral    Pulse  107  --  --  --  104    Heart Rate Source  Monitor  --  --  --  Monitor    Resp  18  --  --  --  16    Resp Rate Source  Visual  --  --  --  Visual    BP  126/73  --  --  --  128/77    BP Location  Left arm  --  --  --  Left arm    BP Method  Automatic  --  --  --  Automatic    Patient Position  Lying  --  --  --  Lying       Oxygen Therapy    SpO2  98 %  --  --  --  98 %    Device (Oxygen Therapy)   room air  room air  room air  room air  room air    Row Name 03/18/19 1500 03/18/19 1100                Vitals    Temp  99.3 °F (37.4 °C)  97.7 °F (36.5 °C)       Temp src  Oral  Oral       Pulse  95  100       Heart Rate Source  Monitor  Monitor       Resp  16  16       Resp Rate Source  Visual  Visual       BP  136/73  115/69       BP Location  Left arm  Left arm       BP Method  Automatic  Automatic       Patient Position  Lying  Lying          Oxygen Therapy    SpO2  97 %  100 %       Device (Oxygen Therapy)  room air  room air           Hospital Medications (active)       Dose Frequency Start End    ! Home medication stored in [facility]  Every 12 Hours Scheduled 3/18/2019     Sig - Route: Every 12 (Twelve) Hours. - Does not apply    clonazePAM (KlonoPIN) tablet 0.5 mg 0.5 mg 2 Times Daily 3/18/2019     Sig - Route: Take 1 tablet by mouth 2 (Two) Times a Day. - Oral    dextrose (D50W) 25 g/ 50mL Intravenous Solution 25 g 25 g Every 15 Minutes PRN 3/18/2019     Sig - Route: Infuse 50 mL into a venous catheter Every 15 (Fifteen) Minutes As Needed for Low Blood Sugar (Blood Sugar Less Than 70). - Intravenous    dextrose (GLUTOSE) oral gel 15 g 15 g Every 15 Minutes PRN 3/18/2019     Sig - Route: Take 15 g by mouth Every 15 (Fifteen) Minutes As Needed for Low Blood Sugar (Blood sugar less than 70). - Oral    divalproex (DEPAKOTE) DR tablet 500 mg 500 mg Every 12 Hours Scheduled 3/18/2019     Sig - Route: Take 1 tablet by mouth Every 12 (Twelve) Hours. - Oral    DULoxetine (CYMBALTA) DR capsule 60 mg 60 mg 2 Times Daily 3/18/2019     Sig - Route: Take 1 capsule by mouth 2 (Two) Times a Day. - Oral    ertapenem (INVanz) 1 g/100 mL 0.9% NS VTB (mbp) 1 g Every 24 Hours 3/19/2019 4/2/2019    Sig - Route: Infuse 100 mL into a venous catheter Daily. - Intravenous    famotidine (PEPCID) tablet 20 mg 20 mg 2 Times Daily 3/18/2019     Sig - Route: Take 1 tablet by mouth 2 (Two) Times a Day. - Oral    glucagon (human  "recombinant) (GLUCAGEN DIAGNOSTIC) injection 1 mg 1 mg As Needed 3/18/2019     Sig - Route: Inject 1 mg under the skin into the appropriate area as directed As Needed (Blood Glucose Less Than 70). - Subcutaneous    HYDROcodone-acetaminophen (NORCO) 5-325 MG per tablet 1 tablet 1 tablet Every 6 Hours PRN 3/18/2019 3/28/2019    Sig - Route: Take 1 tablet by mouth Every 6 (Six) Hours As Needed for Moderate Pain . - Oral    HYDROmorphone (DILAUDID) injection 1 mg 1 mg Once 3/18/2019 3/18/2019    Sig - Route: Infuse 1 mL into a venous catheter 1 (One) Time. - Intravenous    HYDROmorphone (DILAUDID) injection 1 mg 1 mg Every 2 Hours PRN 3/18/2019 3/28/2019    Sig - Route: Infuse 1 mL into a venous catheter Every 2 (Two) Hours As Needed for Severe Pain . - Intravenous    insulin regular (humuLIN R,novoLIN R) injection 0-7 Units 0-7 Units Every 6 Hours Scheduled 3/18/2019     Sig - Route: Inject 0-7 Units under the skin into the appropriate area as directed Every 6 (Six) Hours. - Subcutaneous    LORazepam (ATIVAN) injection 0.5 mg 0.5 mg Every 12 Hours PRN 3/18/2019 3/28/2019    Sig - Route: Infuse 0.25 mL into a venous catheter Every 12 (Twelve) Hours As Needed for Anxiety. - Intravenous    methylnaltrexone (RELISTOR) injection 6 mg 6 mg Every Other Day 3/18/2019     Sig - Route: Inject 0.3 mL under the skin into the appropriate area as directed Every Other Day. - Subcutaneous    propranolol (INDERAL) tablet 10 mg 10 mg Daily 3/19/2019     Sig - Route: Take 1 tablet by mouth Daily. - Oral    sodium chloride 0.9 % bolus 1,000 mL 1,000 mL Once 3/18/2019 3/18/2019    Sig - Route: Infuse 1,000 mL into a venous catheter 1 (One) Time. - Intravenous    sodium chloride 0.9 % flush 10 mL 10 mL As Needed 3/17/2019     Sig - Route: Infuse 10 mL into a venous catheter As Needed for Line Care. - Intravenous    Linked Group 1:  \"And\" Linked Group Details        sodium chloride 0.9 % flush 10 mL 10 mL Every 12 Hours Scheduled 3/18/2019  "    Sig - Route: Infuse 10 mL into a venous catheter Every 12 (Twelve) Hours. - Intravenous    Cosign for Ordering: Accepted by Fior Ferrell APRN on 3/19/2019  7:35 AM    sodium chloride 0.9 % flush 10 mL 10 mL As Needed 3/18/2019     Sig - Route: Infuse 10 mL into a venous catheter As Needed for Line Care (After Medication Administration). - Intravenous    Cosign for Ordering: Accepted by Fior Ferrell APRN on 3/19/2019  7:35 AM    sodium chloride 0.9 % flush 20 mL 20 mL As Needed 3/18/2019     Sig - Route: Infuse 20 mL into a venous catheter As Needed for Line Care (After Blood Draws or Blood Product Administration). - Intravenous    Cosign for Ordering: Accepted by Fior Ferrell APRN on 3/19/2019  7:35 AM    sodium chloride 0.9 % infusion 125 mL/hr Continuous 3/18/2019     Sig - Route: Infuse 125 mL/hr into a venous catheter Continuous. - Intravenous    traZODone (DESYREL) tablet 75 mg 75 mg Nightly 3/18/2019     Sig - Route: Take 1.5 tablets by mouth Every Night. - Oral    HYDROmorphone (DILAUDID) injection 0.5 mg (Discontinued) 0.5 mg Every 2 Hours PRN 3/18/2019 3/18/2019    Sig - Route: Infuse 0.5 mL into a venous catheter Every 2 (Two) Hours As Needed for Severe Pain . - Intravenous    insulin lispro (humaLOG) injection 0-7 Units (Discontinued) 0-7 Units 4 Times Daily With Meals & Nightly 3/18/2019 3/18/2019    Sig - Route: Inject 0-7 Units under the skin into the appropriate area as directed 4 (Four) Times a Day With Meals & at Bedtime. - Subcutaneous    piperacillin-tazobactam (ZOSYN) 3.375 g in iso-osmotic dextrose 50 ml (premix) (Discontinued) 3.375 g Every 6 Hours 3/18/2019 3/19/2019    Sig - Route: Infuse 50 mL into a venous catheter Every 6 (Six) Hours. - Intravenous          Operative/Procedure Notes (all)     No notes of this type exist for this encounter.           Physician Progress Notes (all)      Fior Ferrell APRN at 3/19/2019  7:36 AM          [facility] Progress  "Note    Admission Date: 3/17/2019    Joshua Power  1975  [id number]    Date: 3/19/2019    Meds:    Anti-Infectives (From admission, onward)    Ordered     Dose/Rate Route Frequency Start Stop    19 0100  piperacillin-tazobactam (ZOSYN) 3.375 g in iso-osmotic dextrose 50 ml (premix)     Ordering Provider:  Bonny Putnam MD    3.375 g  over 30 Minutes Intravenous Every 6 Hours 19 0600 19 0559    19 2306  piperacillin-tazobactam (ZOSYN) 4.5 g in iso-osmotic dextrose 100 mL IVPB (premix)     Ordering Provider:  Leidy Osborne APRN    4.5 g  over 30 Minutes Intravenous Once 19 2308 19 0032          CC:  Acute diverticulitis with perforation         SUBJECTIVE: 3/18/19:  Patient is a 44 y.o. male, seen today for acute diverticulitis with perforation.  He presented to the ED with sharp bilateral pelvic pain.  CT scan with perorated diverticulitis, no abscess.  He had a previous episode of diverticulitis last year treated at . He did not have a follow up colonoscopy.  Admitting labs with a leukocytosis of 14, 000, elevated LFTs,  Lactic acidosis, and he has been afebrile.  He denies any fever, but has been experiencing chills.   3/19/19:  He feels terrible.  Pain medicine is not helping \" one bit\".  He complains of terrible headache, and abdominal pain.  He complains of night sweats.         ROS:  No f/c/s. No n/v/d. No rash. No new ADR to Abx.     PE:   Vital Signs  Temp (24hrs), Av.8 °F (37.1 °C), Min:97.6 °F (36.4 °C), Max:99.9 °F (37.7 °C)    Temp  Min: 97.6 °F (36.4 °C)  Max: 99.9 °F (37.7 °C)  BP  Min: 115/69  Max: 139/75  Pulse  Min: 91  Max: 107  Resp  Min: 16  Max: 18  SpO2  Min: 97 %  Max: 100 %    GENERAL: Awake and alert, in no acute distress.   HEENT:  No conjunctival injection. No icterus. Oropharynx clear without evidence of thrush or exudate.  NECK: Supple, no JVD     HEART: RRR; No murmur, rubs, gallops.   LUNGS: Clear to auscultation " bilaterally without wheezing, rales, rhonchi. Normal respiratory effort. Nonlabored. No dullness.  ABDOMEN: Soft, nontender, nondistended. Positive bowel sounds. No rebound or guarding. NO mass or HSM.  EXT:  No cyanosis, clubbing or edema. No cord.  : Genitalia generally unremarkable.  Without Puente catheter.  SKIN: Warm and dry without cutaneous eruptions on Inspection/palpation.    NEURO: Alert and oriented x 3, Motor 5/5 bilaterally    Laboratory Data    Results from last 7 days   Lab Units 03/19/19  0517 03/18/19  0323 03/17/19  2042   WBC 10*3/mm3 15.53* 15.34* 14.09*   HEMOGLOBIN g/dL 13.4 12.8* 15.6   HEMATOCRIT % 40.3 37.9* 44.6   PLATELETS 10*3/mm3 138* 130* 179     Results from last 7 days   Lab Units 03/19/19  0517   SODIUM mmol/L 137   POTASSIUM mmol/L 4.4   CHLORIDE mmol/L 104   CO2 mmol/L 26.0   BUN mg/dL 9   CREATININE mg/dL 1.02   GLUCOSE mg/dL 139*   CALCIUM mg/dL 9.4     Results from last 7 days   Lab Units 03/17/19  2042   ALK PHOS U/L 47   BILIRUBIN mg/dL 0.5   ALT (SGPT) U/L 53*   AST (SGOT) U/L 137*             Results from last 7 days   Lab Units 03/19/19  0517   LACTATE mmol/L 1.2             Estimated Creatinine Clearance: 103.1 mL/min (by C-G formula based on SCr of 1.02 mg/dL).    Microbiology:  Blood Culture   Date Value Ref Range Status   03/17/2019 No growth at 24 hours  Preliminary   03/17/2019 No growth at 24 hours  Preliminary                            Radiology:  Imaging Results (last 72 hours)     Procedure Component Value Units Date/Time    CT Abdomen Pelvis With Contrast [[id number]] Collected:  03/17/19 2144     Updated:  03/17/19 2242    Narrative:       EXAM:    CT Abdomen and Pelvis With Contrast     EXAM DATE/TIME:    3/17/2019 9:44 PM     CLINICAL HISTORY:    44 years old, male; Pain; Abdominal pain; Localized; Lower; Patient HX: Low   abd pain, diarrhea     TECHNIQUE:    Axial computed tomography images of the abdomen and pelvis with intravenous   contrast.    All CT  scans at this facility use at least one of these dose optimization   techniques: automated exposure control; mA and/or kV adjustment per patient   size (includes targeted exams where dose is matched to clinical indication); or   iterative reconstruction.    Coronal reformatted images were created and reviewed.     CONTRAST:    Contrast Material: 100 ml of iso 300; Contrast Route: existing     COMPARISON:    No relevant prior studies available.     FINDINGS:    Lower thorax: No acute findings.     ABDOMEN:    Liver: Normal. No mass.    Gallbladder and bile ducts: Normal. No calcified stones. No ductal dilation.    Pancreas: Normal. No ductal dilation.    Spleen: Normal. No splenomegaly.    Adrenals: Normal. No mass.    Kidneys and ureters: Normal. No hydronephrosis.    Stomach and bowel:  Colonic diverticulosis. Segment of wall thickening at the   junction of the sigmoid and descending colon, consistent in appearance with   acute diverticulitis. There is surrounding fat stranding. There are adjacent   small locules of extraluminal air.    Appendix: A normal appendix is identified.    PELVIS:    Bladder: Unremarkable as visualized.    Reproductive: Unremarkable as visualized.     ABDOMEN and PELVIS:    Intraperitoneal space: See above. No abscess.   Bones/joints: No acute fracture. No dislocation.    Soft tissues: Unremarkable.    Vasculature: Normal. No abdominal aortic aneurysm.    Lymph nodes: Normal. No enlarged lymph nodes.       Impression:       Acute perforated diverticulitis at the junction of the sigmoid and descending   colon. No abscess.    THIS DOCUMENT HAS BEEN ELECTRONICALLY SIGNED BY JOSE G TREVIZO MD          I personally read the radiographic studies     IMPRESSION:  1.  Acute sigmoid diverticulitis-with perforation.  I will plan to simplify his antibiotics at the time of discharge to either Invanz or ceftriaxone/Flagyl.  He will likely require approximately 2 weeks of intravenous antibiotic therapy and  "probably should ultimately undergo surgical resection of this area on an elective basis.  2.  Leukocytosis/neutrophilia-secondary to above  3.  Elevated LFTs  4.  Diabetes mellitus, type 2           RECOMMENDATIONS;  1.  Discontinue Zosyn   2.  Ertapenem     Dr. Vega has obtained the history, performed the physical exam and formulated the above treatment plan.     PAYAL Morrison  3/19/2019  7:36 AM        Electronically signed by Fior Ferrell APRN at 3/19/2019  7:44 AM     Vivi Humphreys MD at 3/18/2019  1:53 PM              [facility] Medicine Services  PROGRESS NOTE    Patient Name: Joshua Power  : 1975  MRN: [id number]    Date of Admission: 3/17/2019  Length of Stay: 0  Primary Care Physician: Riky Saenz MD    Subjective   Subjective     CC:  Abd pain    HPI:  Bilat lower abd soreness, no nausea.  Had diarrhea yesterday.    Had diverticulitis 7 months ago; has not had colonoscopy since age 35.     Seemed NAD when I was there, but three minutes after I left, PICC nurse went in and he told her he might check himself out AMA because he feels so bad.    Review of Systems   Gen-  Mouth dry, has headache.  Has been up to BR today and is less dizzy than yest.   CV- No chest pain, palpitations  Resp- No cough, dyspnea.  Has CONSTANTIN but doesn't use CPAP at home - \"Its my fault, I know.\"  Endo - DM since age 35, no neuropathy or retinopathy, rarely sees MD        Otherwise ROS is negative except as mentioned in the HPI.    Objective   Objective     Vital Signs:   Temp:  [97.4 °F (36.3 °C)-99 °F (37.2 °C)] 97.7 °F (36.5 °C)  Heart Rate:  [] 100  Resp:  [16-18] 16  BP: (115-143)/(61-99) 115/69        Physical Exam:  Constitutional: Awake, alert, pleasant NAD  Eyes: PERRLA, sclerae anicteric, no conjunctival injection  HENT: NCAT, mucous membranes dry  Neck: Supple, no thyromegaly, no lymphadenopathy, trachea midline  Respiratory: Clear to auscultation " bilaterally, nonlabored respirations   Cardiovascular: RRR, no murmurs, rubs, or gallops, palpable pedal pulses bilaterally  Gastrointestinal: pos BS.  Tender BLQ.  Severe rebound pain.  Abd soft.    Musculoskeletal: No bilateral ankle edema, no clubbing or cyanosis to extremities  Psychiatric: Appropriate affect, cooperative  Neurologic: Oriented x 3, strength symmetric in all extremities, Cranial Nerves grossly intact to confrontation, speech clear  Skin: No rashes    Results Reviewed:  I have personally reviewed current lab, radiology, and data and agree.    Results from last 7 days   Lab Units 03/18/19  0323 03/17/19  2042   WBC 10*3/mm3 15.34* 14.09*   HEMOGLOBIN g/dL 12.8* 15.6   HEMATOCRIT % 37.9* 44.6   PLATELETS 10*3/mm3 130* 179     Results from last 7 days   Lab Units 03/18/19  0323 03/17/19  2042   SODIUM mmol/L 139 132   POTASSIUM mmol/L 3.7 5.5   CHLORIDE mmol/L 104 98*   CO2 mmol/L 26.0 23.0   BUN mg/dL 14 15   CREATININE mg/dL 0.97 1.13   GLUCOSE mg/dL 153* 253*   CALCIUM mg/dL 8.1* 8.8   ALT (SGPT) U/L  --  53*   AST (SGOT) U/L  --  137*     Estimated Creatinine Clearance: 108.5 mL/min (by C-G formula based on SCr of 0.97 mg/dL).    No results found for: BNP    Microbiology Results Abnormal     Procedure Component Value - Date/Time    Blood Culture - Blood, Arm, Left [[id number]] Collected:  03/17/19 2325    Lab Status:  Preliminary result Specimen:  Blood from Arm, Left Updated:  03/18/19 1201     Blood Culture No growth at less than 24 hours    Blood Culture - Blood, Arm, Left [[id number]] Collected:  03/17/19 2325    Lab Status:  Preliminary result Specimen:  Blood from Arm, Left Updated:  03/18/19 1201     Blood Culture No growth at less than 24 hours          Imaging Results (last 24 hours)     Procedure Component Value Units Date/Time    CT Abdomen Pelvis With Contrast [[id number]] Collected:  03/17/19 2144     Updated:  03/17/19 2242    Narrative:       EXAM:    CT Abdomen and Pelvis With Contrast      EXAM DATE/TIME:    3/17/2019 9:44 PM     CLINICAL HISTORY:    44 years old, male; Pain; Abdominal pain; Localized; Lower; Patient HX: Low   abd pain, diarrhea     TECHNIQUE:    Axial computed tomography images of the abdomen and pelvis with intravenous   contrast.    All CT scans at this facility use at least one of these dose optimization   techniques: automated exposure control; mA and/or kV adjustment per patient   size (includes targeted exams where dose is matched to clinical indication); or   iterative reconstruction.    Coronal reformatted images were created and reviewed.     CONTRAST:    Contrast Material: 100 ml of iso 300; Contrast Route: existing     COMPARISON:    No relevant prior studies available.     FINDINGS:    Lower thorax: No acute findings.     ABDOMEN:    Liver: Normal. No mass.    Gallbladder and bile ducts: Normal. No calcified stones. No ductal dilation.    Pancreas: Normal. No ductal dilation.    Spleen: Normal. No splenomegaly.    Adrenals: Normal. No mass.    Kidneys and ureters: Normal. No hydronephrosis.    Stomach and bowel:  Colonic diverticulosis. Segment of wall thickening at the   junction of the sigmoid and descending colon, consistent in appearance with   acute diverticulitis. There is surrounding fat stranding. There are adjacent   small locules of extraluminal air.    Appendix: A normal appendix is identified.    PELVIS:    Bladder: Unremarkable as visualized.    Reproductive: Unremarkable as visualized.     ABDOMEN and PELVIS:    Intraperitoneal space: See above. No abscess.   Bones/joints: No acute fracture. No dislocation.    Soft tissues: Unremarkable.    Vasculature: Normal. No abdominal aortic aneurysm.    Lymph nodes: Normal. No enlarged lymph nodes.       Impression:       Acute perforated diverticulitis at the junction of the sigmoid and descending   colon. No abscess.    THIS DOCUMENT HAS BEEN ELECTRONICALLY SIGNED BY JOSE G TREVIZO MD               I have reviewed  the medications:    Current Facility-Administered Medications:   •  ! [facility] medication [facility], , Does not apply, Q12H, Jose Rush, [facility]  •  dextrose (D50W) 25 g/ 50mL Intravenous Solution 25 g, 25 g, Intravenous, Q15 Min PRN, Bonny Putnam MD  •  dextrose (GLUTOSE) oral gel 15 g, 15 g, Oral, Q15 Min PRN, Bonny Putnam MD  •  glucagon (human recombinant) (GLUCAGEN DIAGNOSTIC) injection 1 mg, 1 mg, Subcutaneous, PRN, Bonny Putnam MD  •  HYDROmorphone (DILAUDID) injection 0.5 mg, 0.5 mg, Intravenous, Q2H PRN, Bonny Putnam MD, 0.5 mg at 03/18/19 1246  •  insulin regular (humuLIN R,novoLIN R) injection 0-7 Units, 0-7 Units, Subcutaneous, Q6H, Gladis Bhakta APRN  •  LORazepam (ATIVAN) injection 0.5 mg, 0.5 mg, Intravenous, Q12H PRN, Bonny Putnam MD, 0.5 mg at 03/18/19 0328  •  piperacillin-tazobactam (ZOSYN) 3.375 g in iso-osmotic dextrose 50 ml (premix), 3.375 g, Intravenous, Q6H, Bonny Putnam MD, 3.375 g at 03/18/19 1202  •  [COMPLETED] Insert peripheral IV, , , Once **AND** sodium chloride 0.9 % flush 10 mL, 10 mL, Intravenous, PRN, Leidy Osborne, APRN  •  sodium chloride 0.9 % infusion, 125 mL/hr, Intravenous, Continuous, Bonny Putnam MD, Last Rate: 125 mL/hr at 03/18/19 1235, 125 mL/hr at 03/18/19 1235      Assessment/Plan   Assessment / Plan     Active Hospital Problems    Diagnosis Date Noted   • Diverticulitis of colon with perforation [K57.20] 03/18/2019   • HTN (hypertension) [I10] 03/18/2019   • Type 2 diabetes mellitus (CMS/HCC) [E11.9] 03/18/2019   • Anemia [D64.9] 03/18/2019   • Perforated diverticulum [K57.80] 03/18/2019   • CONSTANTIN (obstructive sleep apnea) [G47.33] 03/18/2019   • Depression [F32.9] 03/18/2019   • Chronic pain syndrome [G89.4] 03/18/2019   • Dehydration [E86.0] 03/18/2019   • Lactic acidosis [E87.2] 03/18/2019          Brief Hospital Course to date:  Joshua Power is a 44 y.o. male with  history of CONSTANTIN, HTN, depression, IDDM2, and chronic pain syndrome, admitted with complicated recurrent diverticulitis .  First episode 7 months ago, treated at [facility].       Diverticulitis with microperf  - abx day 1  - ID and Gen Surg consulted  - Lactic acidosis:  Fluids - give additional bolus    DM2  - normally takes Humalog 26 units BID  - 8.8 A1c  - basal/SSI.  Currently NPO    CONSTANTIN  - CPAP at night    LFTs elev  - denies Etoh use  - liver, spleen and GB nl on CT    Anxiety, chronic benzos  - continue home meds when able to take po.   - IV ativan for now.       DVT Prophylaxis:      Disposition: I expect the patient to be discharged tbd.    CODE STATUS:   Code Status and Medical Interventions:   Ordered at: 03/18/19 0001     Code Status:    CPR     Medical Interventions (Level of Support Prior to Arrest):    Full         Electronically signed by Vivi Humphreys MD, 03/18/19, 1:53 PM.        Electronically signed by Vivi Humphreys MD at 3/18/2019  2:25 PM          Consult Notes (all)      aMrco A Dumont MD at 3/18/2019  3:22 PM      Consult Orders    1. Inpatient General Surgery Consult [[id number]] ordered by Bonny Putnam MD at 03/18/19 0001                General Surgery Consultation Note    Date of Service: 3/18/2019  Joshua Power  [id number]  1975      Referring Provider: Vivi Humphreys MD    Location of Consult: Hospital floor     Reason for Consultation: Diverticulitis       History of Present Illness:  I am seeing, Joshua Power, in consultation for Vivi Humphreys MD regarding diverticulitis.  44-year-old gentleman presents with approximately 24 hours worth of crampy abdominal pain which is located in the left lower quadrant.  He reports chills without fever, and nausea without significant vomiting.  He had an episode of diverticulitis approximately 1 year ago for which she was seen at the [facility].  He is feeling somewhat better.    Problem List Items Addressed This  Visit        Digestive    Perforated diverticulum - Primary          Past Medical History:   Diagnosis Date   • Anemia    • Arthritis    • Diabetes mellitus (CMS/HCC)    • DISH (diffuse idiopathic skeletal hyperostosis)    • Headache    • Hypertension    • Injury of back        No past surgical history on file.    Allergies   Allergen Reactions   • Nsaids GI Bleeding       No current facility-administered medications on file prior to encounter.      Current Outpatient Medications on File Prior to Encounter   Medication Sig Dispense Refill   • clonazePAM (KlonoPIN) 0.5 MG tablet Take 0.5 mg by mouth 2 (Two) Times a Day. TAKE 0.5MG IN AM, TAKE 1MG DAILY AT 4PM     • divalproex (DEPAKOTE) 500 MG DR tablet Take 500 mg by mouth 2 (Two) Times a Day.     • DULoxetine (CYMBALTA) 20 MG capsule Take 60 mg by mouth 2 (Two) Times a Day.     • ferrous gluconate 324 (37.5 Fe) MG tablet tablet Take 1 mg by mouth Daily With Breakfast.     • insulin lispro (humaLOG) 100 UNIT/ML injection Inject 26 Units under the skin into the appropriate area as directed 2 (Two) Times a Day.     • propranolol (INDERAL) 10 MG tablet Take 10 mg by mouth Daily.     • traZODone (DESYREL) 150 MG tablet Take 150 mg by mouth Every Night.           Current Facility-Administered Medications:   •  ! [facility] medication [facility] in [facility], , Does not apply, Q12H, Jose Rush, [facility]  •  dextrose (D50W) 25 g/ 50mL Intravenous Solution 25 g, 25 g, Intravenous, Q15 Min PRN, Bonny Putnam MD  •  dextrose (GLUTOSE) oral gel 15 g, 15 g, Oral, Q15 Min PRN, Bonny Putnam MD  •  glucagon (human recombinant) (GLUCAGEN DIAGNOSTIC) injection 1 mg, 1 mg, Subcutaneous, PRN, Bonny Putnam MD  •  HYDROmorphone (DILAUDID) injection 1 mg, 1 mg, Intravenous, Q2H PRN, Vivi Humphreys MD  •  insulin regular (humuLIN R,novoLIN R) injection 0-7 Units, 0-7 Units, Subcutaneous, Q6H, Gladis Bhakta APRN  •  LORazepam (ATIVAN) injection 0.5 mg, 0.5  mg, Intravenous, Q12H PRN, Bonny Putnam MD, 0.5 mg at 03/18/19 0328  •  piperacillin-tazobactam (ZOSYN) 3.375 g in iso-osmotic dextrose 50 ml (premix), 3.375 g, Intravenous, Q6H, Bonny Putnam MD, 3.375 g at 03/18/19 1202  •  sodium chloride 0.9 % bolus 1,000 mL, 1,000 mL, Intravenous, Once, Vivi Humphreys MD  •  [COMPLETED] Insert peripheral IV, , , Once **AND** sodium chloride 0.9 % flush 10 mL, 10 mL, Intravenous, PRN, Leidy Osborne APRN  •  sodium chloride 0.9 % flush 10 mL, 10 mL, Intravenous, Q12H, Fior Ferrell APRN  •  sodium chloride 0.9 % flush 10 mL, 10 mL, Intravenous, PRN, Fior Ferrell APRN  •  sodium chloride 0.9 % flush 20 mL, 20 mL, Intravenous, PRN, Fior Ferrell APRN  •  sodium chloride 0.9 % infusion, 125 mL/hr, Intravenous, Continuous, Bonny Putnam MD, Last Rate: 125 mL/hr at 03/18/19 1235, 125 mL/hr at 03/18/19 1235    Family History   Problem Relation Age of Onset   • Diabetes Father    • Stroke Father    • Kidney failure Father      Social History     Socioeconomic History   • Marital status:      Spouse name: Not on file   • Number of children: Not on file   • Years of education: Not on file   • Highest education level: Not on file   Social Needs   • Financial resource strain: Not on file   • Food insecurity - worry: Not on file   • Food insecurity - inability: Not on file   • Transportation needs - medical: Not on file   • Transportation needs - non-medical: Not on file   Occupational History   • Not on file   Tobacco Use   • Smoking status: Never Smoker   • Smokeless tobacco: Never Used   Substance and Sexual Activity   • Alcohol use: No   • Drug use: No   • Sexual activity: Defer   Other Topics Concern   • Not on file   Social History Narrative   • Not on file       Review of Systems:  Review of Systems   Constitutional: Negative for fatigue and fever.   HENT: Negative for drooling, hearing loss, postnasal drip and sore  "throat.    Eyes: Negative for photophobia and visual disturbance.   Respiratory: Negative for chest tightness and shortness of breath.    Cardiovascular: Negative for chest pain and leg swelling.   Gastrointestinal: Positive for abdominal distention, constipation and nausea.   Endocrine: Positive for polyphagia. Negative for polyuria.   Genitourinary: Negative for difficulty urinating, flank pain and hematuria.   Musculoskeletal: Negative for arthralgias and myalgias.   Skin: Negative for color change and pallor.   Allergic/Immunologic: Negative for immunocompromised state.   Neurological: Positive for headaches. Negative for speech difficulty and light-headedness.   Hematological: Negative for adenopathy.   Psychiatric/Behavioral: Negative for behavioral problems, dysphoric mood and self-injury.     Otherwise the 12 point review of systems is negative.    /69 (BP Location: Left arm, Patient Position: Lying)   Pulse 100   Temp 97.7 °F (36.5 °C) (Oral)   Resp 16   Ht 175.3 cm (69\")   Wt 91.2 kg (201 lb 2 oz)   SpO2 100%   BMI 29.70 kg/m²    Body mass index is 29.7 kg/m².    General: Mild distress  HEENT: PER, no icterus, normal sclerae  Cardiac: regular rhythm,  no audible rubs  Pulmonary: bilateral breath sounds, non labored  Abdominal: Soft, tender in the lower abdomen, no evidence of a generalized peritonitis  Neurologic: awake, alert, no obvious focal deficits  Extremities: warm, no edema  Skin: no obvious rashes nor worrisome lesions seen     CBC  Results from last 7 days   Lab Units 03/18/19  0323   WBC 10*3/mm3 15.34*   HEMOGLOBIN g/dL 12.8*   HEMATOCRIT % 37.9*   PLATELETS 10*3/mm3 130*       CMP  Results from last 7 days   Lab Units 03/18/19  0323 03/17/19  2042   SODIUM mmol/L 139 132   POTASSIUM mmol/L 3.7 5.5   CHLORIDE mmol/L 104 98*   CO2 mmol/L 26.0 23.0   BUN mg/dL 14 15   CREATININE mg/dL 0.97 1.13   CALCIUM mg/dL 8.1* 8.8   BILIRUBIN mg/dL  --  0.5   ALK PHOS U/L  --  47   ALT (SGPT) " U/L  --  53*   AST (SGOT) U/L  --  137*   GLUCOSE mg/dL 153* 253*       Radiology  Imaging Results (last 72 hours)     Procedure Component Value Units Date/Time    CT Abdomen Pelvis With Contrast [[id number]] Collected:  03/17/19 2144     Updated:  03/17/19 2242    Narrative:       EXAM:    CT Abdomen and Pelvis With Contrast     EXAM DATE/TIME:    3/17/2019 9:44 PM     CLINICAL HISTORY:    44 years old, male; Pain; Abdominal pain; Localized; Lower; Patient HX: Low   abd pain, diarrhea     TECHNIQUE:    Axial computed tomography images of the abdomen and pelvis with intravenous   contrast.    All CT scans at this facility use at least one of these dose optimization   techniques: automated exposure control; mA and/or kV adjustment per patient   size (includes targeted exams where dose is matched to clinical indication); or   iterative reconstruction.    Coronal reformatted images were created and reviewed.     CONTRAST:    Contrast Material: 100 ml of iso 300; Contrast Route: existing     COMPARISON:    No relevant prior studies available.     FINDINGS:    Lower thorax: No acute findings.     ABDOMEN:    Liver: Normal. No mass.    Gallbladder and bile ducts: Normal. No calcified stones. No ductal dilation.    Pancreas: Normal. No ductal dilation.    Spleen: Normal. No splenomegaly.    Adrenals: Normal. No mass.    Kidneys and ureters: Normal. No hydronephrosis.    Stomach and bowel:  Colonic diverticulosis. Segment of wall thickening at the   junction of the sigmoid and descending colon, consistent in appearance with   acute diverticulitis. There is surrounding fat stranding. There are adjacent   small locules of extraluminal air.    Appendix: A normal appendix is identified.    PELVIS:    Bladder: Unremarkable as visualized.    Reproductive: Unremarkable as visualized.     ABDOMEN and PELVIS:    Intraperitoneal space: See above. No abscess.   Bones/joints: No acute fracture. No dislocation.    Soft tissues:  Unremarkable.    Vasculature: Normal. No abdominal aortic aneurysm.    Lymph nodes: Normal. No enlarged lymph nodes.       Impression:       Acute perforated diverticulitis at the junction of the sigmoid and descending   colon. No abscess.    THIS DOCUMENT HAS BEEN ELECTRONICALLY SIGNED BY JOSE G TREVIZO MD            Assessment:  Perforated sigmoid diverticulitis  Diabetes mellitus  Hypertension    Plan:  Clear liquids, IV fluid resuscitation, IV antibiotics, and judicious glucose control.  I personally reviewed the CT images of his abdomen and pelvis.  I think he will get over this without an acute operation at this point.  I discussed this with the patient and his mother (via telephone).  Repeat labs in the morning.    Marco A Dumont MD  03/18/19  3:23 PM      Electronically signed by Marco A Dumont MD at 3/18/2019  3:27 PM     Amador Vega MD at 3/18/2019 10:25 AM          INFECTIOUS DISEASE CONSULT/INITIAL HOSPITAL VISIT    Joshua Power  1975  [id number]    Date of Consult: 3/18/2019    Admission Date: 3/17/2019      Requesting Provider: Vivi Humphreys MD  Evaluating Physician: Amador Vega MD    Reason for Consultation: acute diverticulitis with perforation     History of present illness:    Patient is a 44 y.o. male, seen today for acute diverticulitis with perforation.  He presented to the ED with sharp bilateral pelvic pain.  CT scan with perorated diverticulitis, no abscess.  He had a previous episode of diverticulitis last year treated at . He did not have a follow up colonoscopy.  Admitting labs with a leukocytosis of 14, 000, elevated LFTs,  Lactic acidosis, and he has been afebrile.  He denies any fever, but has been experiencing chills.     Past Medical History:   Diagnosis Date   • Anemia    • Arthritis    • Diabetes mellitus (CMS/HCC)    • DISH (diffuse idiopathic skeletal hyperostosis)    • Headache    • Hypertension    • Injury of back        History reviewed. No  pertinent surgical history.    Family History   Problem Relation Age of Onset   • Diabetes Father    • Stroke Father    • Kidney failure Father        Social History     Socioeconomic History   • Marital status:      Spouse name: Not on file   • Number of children: Not on file   • Years of education: Not on file   • Highest education level: Not on file   Social Needs   • Financial resource strain: Not on file   • Food insecurity - worry: Not on file   • Food insecurity - inability: Not on file   • Transportation needs - medical: Not on file   • Transportation needs - non-medical: Not on file   Occupational History   • Not on file   Tobacco Use   • Smoking status: Never Smoker   • Smokeless tobacco: Never Used   Substance and Sexual Activity   • Alcohol use: No   • Drug use: No   • Sexual activity: Defer   Other Topics Concern   • Not on file   Social History Narrative   • Not on file       Allergies   Allergen Reactions   • Nsaids GI Bleeding         Medication:    Current Facility-Administered Medications:   •  ! Home medication [facility], , Does not apply, Q12H, Jose Rush, [facility]  •  clonazePAM (KlonoPIN) tablet 0.5 mg, 0.5 mg, Oral, BID, Vivi Humphreys MD  •  dextrose (D50W) 25 g/ 50mL Intravenous Solution 25 g, 25 g, Intravenous, Q15 Min PRN, Bonny Putnam MD  •  dextrose (GLUTOSE) oral gel 15 g, 15 g, Oral, Q15 Min PRN, Bonny Putnam MD  •  divalproex (DEPAKOTE) DR tablet 500 mg, 500 mg, Oral, Q12H, Vivi Humphreys MD  •  DULoxetine (CYMBALTA) DR capsule 60 mg, 60 mg, Oral, BID, Vivi Humphreys MD  •  famotidine (PEPCID) tablet 20 mg, 20 mg, Oral, BID, Marco A Dumont MD  •  glucagon (human recombinant) (GLUCAGEN DIAGNOSTIC) injection 1 mg, 1 mg, Subcutaneous, PRN, Bonny Putnam MD  •  HYDROcodone-acetaminophen (NORCO) 5-325 MG per tablet 1 tablet, 1 tablet, Oral, Q6H PRN, Marco A Dumont MD  •  HYDROmorphone (DILAUDID) injection 1 mg,  1 mg, Intravenous, Q2H PRN, Vivi Humphreys MD  •  insulin regular (humuLIN R,novoLIN R) injection 0-7 Units, 0-7 Units, Subcutaneous, Q6H, Gladis Bhakta APRN  •  LORazepam (ATIVAN) injection 0.5 mg, 0.5 mg, Intravenous, Q12H PRN, Bonny Putnam MD, 0.5 mg at 03/18/19 0328  •  methylnaltrexone (RELISTOR) injection 6 mg, 6 mg, Subcutaneous, Every Other Day, Marco A Dumont MD  •  piperacillin-tazobactam (ZOSYN) 3.375 g in iso-osmotic dextrose 50 ml (premix), 3.375 g, Intravenous, Q6H, Bonny Putnam MD, 3.375 g at 03/18/19 1202  •  [START ON 3/19/2019] propranolol (INDERAL) tablet 10 mg, 10 mg, Oral, Daily, Vivi Humphreys MD  •  sodium chloride 0.9 % bolus 1,000 mL, 1,000 mL, Intravenous, Once, Vivi Humphreys MD, Last Rate: 500 mL/hr at 03/18/19 1545, 1,000 mL at 03/18/19 1545  •  [COMPLETED] Insert peripheral IV, , , Once **AND** sodium chloride 0.9 % flush 10 mL, 10 mL, Intravenous, PRN, Leidy Osborne APRN  •  sodium chloride 0.9 % flush 10 mL, 10 mL, Intravenous, Q12H, Fior Ferrell APRN  •  sodium chloride 0.9 % flush 10 mL, 10 mL, Intravenous, PRN, Fior Ferrell APRN  •  sodium chloride 0.9 % flush 20 mL, 20 mL, Intravenous, PRN, Fior Ferrell APRN  •  sodium chloride 0.9 % infusion, 125 mL/hr, Intravenous, Continuous, Bonny Putnam MD, Last Rate: 125 mL/hr at 03/18/19 1235, 125 mL/hr at 03/18/19 1235  •  traZODone (DESYREL) tablet 75 mg, 75 mg, Oral, Nightly, Vvii Humphreys MD    Antibiotics:  Anti-Infectives (From admission, onward)    Ordered     Dose/Rate Route Frequency Start Stop    03/18/19 0100  piperacillin-tazobactam (ZOSYN) 3.375 g in iso-osmotic dextrose 50 ml (premix)     Ordering Provider:  Bonny Putnam MD    3.375 g  over 30 Minutes Intravenous Every 6 Hours 03/18/19 0600 03/23/19 0559    03/17/19 2306  piperacillin-tazobactam (ZOSYN) 4.5 g in iso-osmotic dextrose 100 mL IVPB (premix)     Ordering Provider:  Leidy Osborne  APRN    4.5 g  over 30 Minutes Intravenous Once 19 2308 19 0032            Review of Systems:  Constitutional-- No Fever, +  chills or sweats.  Appetite good, and no malaise. No fatigue.  HEENT-- No new vision, hearing or throat complaints.  No epistaxis or oral sores.  Denies odynophagia or dysphagia. No headache, photophobia or neck stiffness.  CV-- No chest pain, palpitation or syncope  Resp-- No SOB/cough/Hemoptysis  GI- No nausea, vomiting, or diarrhea  Bilateral lower quadrant pain  -- No dysuria, hematuria, or flank pain.  Denies hesitancy, urgency or flank pain.  Lymph- no swollen lymph nodes in neck/axilla or groin.   Heme- No active bruising or bleeding; no Hx of DVT or PE.  MS-- no swelling or pain in the bones or joints of arms/legs.  No new back pain.  Neuro-- No acute focal weakness or numbness in the arms or legs.  No seizures.  Skin--No rashes or lesions      Physical Exam:   Vital Signs  Temp (24hrs), Av.4 °F (36.9 °C), Min:97.4 °F (36.3 °C), Max:99.3 °F (37.4 °C)    Temp  Min: 97.4 °F (36.3 °C)  Max: 99.3 °F (37.4 °C)  BP  Min: 115/69  Max: 143/99  Pulse  Min: 79  Max: 110  Resp  Min: 16  Max: 18  SpO2  Min: 95 %  Max: 100 %    GENERAL: Awake and alert, in no acute distress.   HEENT: Normocephalic, atraumatic.  PERRL. EOMI. No conjunctival injection. No icterus. Oropharynx clear without evidence of thrush or exudate. No evidence of peridontal disease.    NECK: Supple without nuchal rigidity. No mass.  LYMPH: No cervical, axillary or inguinal lymphadenopathy.  HEART: RRR; No murmur, rubs, gallops.   LUNGS: Clear to auscultation bilaterally without wheezing, rales, rhonchi. Normal respiratory effort. Nonlabored. No dullness.  ABDOMEN: Soft, moderately tender R> L lower quadrants  EXT:  No cyanosis, clubbing or edema. No cord.  : Genitalia generally unremarkable.  Without Puente catheter.  MSK: FROM without joint effusions noted arms/legs.    SKIN: Warm and dry without cutaneous  eruptions on Inspection/palpation.    NEURO: Oriented to PPT. No focal deficits on motor/sensory exam at arms/legs.  PSYCHIATRIC: Normal insight and judgement. Cooperative with PE    Laboratory Data    Results from last 7 days   Lab Units 03/18/19  0323 03/17/19  2042   WBC 10*3/mm3 15.34* 14.09*   HEMOGLOBIN g/dL 12.8* 15.6   HEMATOCRIT % 37.9* 44.6   PLATELETS 10*3/mm3 130* 179     Results from last 7 days   Lab Units 03/18/19  0323   SODIUM mmol/L 139   POTASSIUM mmol/L 3.7   CHLORIDE mmol/L 104   CO2 mmol/L 26.0   BUN mg/dL 14   CREATININE mg/dL 0.97   GLUCOSE mg/dL 153*   CALCIUM mg/dL 8.1*     Results from last 7 days   Lab Units 03/17/19  2042   ALK PHOS U/L 47   BILIRUBIN mg/dL 0.5   ALT (SGPT) U/L 53*   AST (SGOT) U/L 137*             Results from last 7 days   Lab Units 03/18/19  0342   LACTATE mmol/L 1.5             Estimated Creatinine Clearance: 108.5 mL/min (by C-G formula based on SCr of 0.97 mg/dL).      Microbiology:      3/17:  BC pending                           Radiology:  Imaging Results (last 72 hours)     Procedure Component Value Units Date/Time    CT Abdomen Pelvis With Contrast [[id number]] Collected:  03/17/19 2144     Updated:  03/17/19 2242    Narrative:       EXAM:    CT Abdomen and Pelvis With Contrast     EXAM DATE/TIME:    3/17/2019 9:44 PM     CLINICAL HISTORY:    44 years old, male; Pain; Abdominal pain; Localized; Lower; Patient HX: Low   abd pain, diarrhea     TECHNIQUE:    Axial computed tomography images of the abdomen and pelvis with intravenous   contrast.    All CT scans at this facility use at least one of these dose optimization   techniques: automated exposure control; mA and/or kV adjustment per patient   size (includes targeted exams where dose is matched to clinical indication); or   iterative reconstruction.    Coronal reformatted images were created and reviewed.     CONTRAST:    Contrast Material: 100 ml of iso 300; Contrast Route: existing     COMPARISON:    No  relevant prior studies available.     FINDINGS:    Lower thorax: No acute findings.     ABDOMEN:    Liver: Normal. No mass.    Gallbladder and bile ducts: Normal. No calcified stones. No ductal dilation.    Pancreas: Normal. No ductal dilation.    Spleen: Normal. No splenomegaly.    Adrenals: Normal. No mass.    Kidneys and ureters: Normal. No hydronephrosis.    Stomach and bowel:  Colonic diverticulosis. Segment of wall thickening at the   junction of the sigmoid and descending colon, consistent in appearance with   acute diverticulitis. There is surrounding fat stranding. There are adjacent   small locules of extraluminal air.    Appendix: A normal appendix is identified.    PELVIS:    Bladder: Unremarkable as visualized.    Reproductive: Unremarkable as visualized.     ABDOMEN and PELVIS:    Intraperitoneal space: See above. No abscess.   Bones/joints: No acute fracture. No dislocation.    Soft tissues: Unremarkable.    Vasculature: Normal. No abdominal aortic aneurysm.    Lymph nodes: Normal. No enlarged lymph nodes.       Impression:       Acute perforated diverticulitis at the junction of the sigmoid and descending   colon. No abscess.    THIS DOCUMENT HAS BEEN ELECTRONICALLY SIGNED BY JOSE G TREVIZO MD        I read his CT scan    Impression:   1.  Acute sigmoid diverticulitis-with perforation.  I will plan to simplify his antibiotics at the time of discharge to either Invanz or ceftriaxone/Flagyl.  He will likely require approximately 2 weeks of intravenous antibiotic therapy and probably should ultimately undergo surgical resection of this area on an elective basis.  2.  Leukocytosis/neutrophilia-secondary to above  3.  Elevated LFTs  4.  Diabetes mellitus, type 2      PLAN/RECOMMENDATIONS:   Thank you for asking us to see Joshua Power, I recommend the followin.  Continue Zosyn for now  2.  PICC line placement  3.  Surgery consult    Dr. Vega has obtained the history, performed the physical exam and  formulated the above treatment plan.        I discussed his situation with Dr. Dumont today.  We will plan for him to discharge soon on intravenous antibiotic therapy and consider a partial colectomy in 3-4 weeks.    Amador Vega MD  3/18/2019  4:45 PM                  Electronically signed by Amador Vega MD at 3/18/2019  4:47 PM

## 2019-03-19 NOTE — NURSING NOTE
I just spoke with pt per his request concerning him not feeling that his pain was not adequately being controlled. He complained that his nurse, Gin, was rude & not advocating for him.   I will discuss his concerns with pain control with Dr. Thakur.  No further complaints at this time.  Pt aggitated & fidgiting.  + eye contact.  Stating appreciation of care.    Rachna Lopez

## 2019-03-19 NOTE — CONSULTS
"Diabetes Education  Assessment/Teaching    Patient Name:  Joshua Power  YOB: 1975  MRN: [id number]  Admit Date:  3/17/2019      Assessment Date:  3/19/2019    Most Recent Value   General Information    Referral From:  Database   Height  175.3 cm (69\")   Height Method  Stated   Weight  91.2 kg (201 lb 2 oz)   Weight Method  Standing scale   Pregnancy Assessment   Diabetes History   What type of diabetes do you have?  Type 2   Length of Diabetes Diagnosis  6 - 10 years   Current DM knowledge  good   Have you had diabetes education/teaching in the past?  yes   When and where was your diabetes education?  [facility] [place]    Do you test your blood sugar at home?  yes   Have you had high blood sugar? (>140mg/dl)  yes   How often do you have high blood sugar?  frequently   Do you have any diabetes complications?  none   Education Preferences   Nutrition Information   What is the biggest challenge you have with your diet?  Poor choices, Portions, Knowledge   Assessment Topics   Healthy Eating - Assessment  Needs education   Being Active - Assessment  Competent   Taking Medication - Assessment  Competent   Problem Solving - Assessment  Competent   Reducing Risk - Assessment  Needs education   Healthy Coping - Assessment  Needs education   Monitoring - Assessment  Competent   DM Goals            Most Recent Value   DM Education Needs   Meter  Has own   Frequency of Testing  2 times a day   Medication  Insulin   Problem Solving  Hypoglycemia, Hyperglycemia, Sick days, Signs, Symptoms, Treatment   Physical Activity  Walking   Discharge Plan  Home   Motivation  Moderate   Teaching Method  Explanation, Discussion   Patient Response  Verbalized understanding              Pt granted permission to be seen. Shares he has had diabetes for 9 years and is currently managed by  Josseline Malhotra. He is not satisfied with care and requests pt establishment with [facility], shared with Gin primary RN. Currently on insulim " "bid, per pt was on januvia and metformin however was recently d/c'd. Pt is knowledgable with diabetes management \"it's just doing it\". He shares when warm weather arrives he will begin running again and weight will come off. Discussed  type 2 diabetes self-management, risk factors, and importance of blood glucose control to reduce complications. Target blood glucose readings and A1c goals per ADA were reviewed. Reviewed with patient current A1c and discussed its significance. Signs, symptoms and treatment of hyperglycemia and hypoglycemia were discussed. Lifestyle changes such as physical activity with MD approval and healthy eating were encouraged. Pt requests diet information, RD consulted. Educational handouts provided. Outpatient education encouraged, information given. Encouraged to call with any questions/concerns.       Electronically signed by:  Ivana Cespedes RN  03/19/19 3:23 PM  "

## 2019-03-19 NOTE — PROGRESS NOTES
"[facility] Progress Note    Admission Date: 3/17/2019    Joshua Power  1975  [id number]    Date: 3/19/2019    Meds:    Anti-Infectives (From admission, onward)    Ordered     Dose/Rate Route Frequency Start Stop    19 0744  ertapenem (INVanz) 1 g/100 mL 0.9% NS VTB (mbp)     Ordering Provider:  Amador Vega MD    1 g  over 30 Minutes Intravenous Every 24 Hours 19 0900 19 0859    19 2306  piperacillin-tazobactam (ZOSYN) 4.5 g in iso-osmotic dextrose 100 mL IVPB (premix)     Ordering Provider:  Leidy Osborne APRN    4.5 g  over 30 Minutes Intravenous Once 19 2308 19 0032          CC:  Acute diverticulitis with perforation         SUBJECTIVE: 3/18/19:  Patient is a 44 y.o. male, seen today for acute diverticulitis with perforation.  He presented to the ED with sharp bilateral pelvic pain.  CT scan with perorated diverticulitis, no abscess.  He had a previous episode of diverticulitis last year treated at . He did not have a follow up colonoscopy.  Admitting labs with a leukocytosis of 14, 000, elevated LFTs,  Lactic acidosis, and he has been afebrile.  He denies any fever, but has been experiencing chills.   3/19/19:  He feels terrible.  Pain medicine is not helping \" one bit\".  He complains of terrible headache, and abdominal pain.  He complains of night sweats.         ROS:  No f/c/s. No n/v/d. No rash. No new ADR to Abx.     PE:   Vital Signs  Temp (24hrs), Av.7 °F (37.1 °C), Min:97.6 °F (36.4 °C), Max:99.9 °F (37.7 °C)    Temp  Min: 97.6 °F (36.4 °C)  Max: 99.9 °F (37.7 °C)  BP  Min: 126/73  Max: 139/75  Pulse  Min: 86  Max: 107  Resp  Min: 16  Max: 18  SpO2  Min: 97 %  Max: 98 %    GENERAL: Awake and alert, in no acute distress.   HEENT:  No conjunctival injection. No icterus. Oropharynx clear without evidence of thrush or exudate.  NECK: Supple, no JVD     HEART: RRR; No murmur, rubs, gallops.   LUNGS: Clear to auscultation bilaterally without wheezing, rales, " rhonchi. Normal respiratory effort. Nonlabored. No dullness.  ABDOMEN: Soft, nontender, nondistended. Positive bowel sounds. No rebound or guarding. NO mass or HSM.  EXT:  No cyanosis, clubbing or edema. No cord.  : Genitalia generally unremarkable.  Without Puente catheter.  SKIN: Warm and dry without cutaneous eruptions on Inspection/palpation.    NEURO: Alert and oriented x 3, Motor 5/5 bilaterally    Laboratory Data    Results from last 7 days   Lab Units 03/19/19  0517 03/18/19  0323 03/17/19  2042   WBC 10*3/mm3 15.53* 15.34* 14.09*   HEMOGLOBIN g/dL 13.4 12.8* 15.6   HEMATOCRIT % 40.3 37.9* 44.6   PLATELETS 10*3/mm3 138* 130* 179     Results from last 7 days   Lab Units 03/19/19  0517   SODIUM mmol/L 137   POTASSIUM mmol/L 4.4   CHLORIDE mmol/L 104   CO2 mmol/L 26.0   BUN mg/dL 9   CREATININE mg/dL 1.02   GLUCOSE mg/dL 139*   CALCIUM mg/dL 9.4     Results from last 7 days   Lab Units 03/17/19  2042   ALK PHOS U/L 47   BILIRUBIN mg/dL 0.5   ALT (SGPT) U/L 53*   AST (SGOT) U/L 137*             Results from last 7 days   Lab Units 03/19/19  0517   LACTATE mmol/L 1.2             Estimated Creatinine Clearance: 103.1 mL/min (by C-G formula based on SCr of 1.02 mg/dL).    Microbiology:  Blood Culture   Date Value Ref Range Status   03/17/2019 No growth at 24 hours  Preliminary   03/17/2019 No growth at 24 hours  Preliminary                            Radiology:  Imaging Results (last 72 hours)     Procedure Component Value Units Date/Time    CT Abdomen Pelvis With Contrast [[id number]] Collected:  03/17/19 2144     Updated:  03/17/19 2242    Narrative:       EXAM:    CT Abdomen and Pelvis With Contrast     EXAM DATE/TIME:    3/17/2019 9:44 PM     CLINICAL HISTORY:    44 years old, male; Pain; Abdominal pain; Localized; Lower; Patient HX: Low   abd pain, diarrhea     TECHNIQUE:    Axial computed tomography images of the abdomen and pelvis with intravenous   contrast.    All CT scans at this facility use at least one  of these dose optimization   techniques: automated exposure control; mA and/or kV adjustment per patient   size (includes targeted exams where dose is matched to clinical indication); or   iterative reconstruction.    Coronal reformatted images were created and reviewed.     CONTRAST:    Contrast Material: 100 ml of iso 300; Contrast Route: existing     COMPARISON:    No relevant prior studies available.     FINDINGS:    Lower thorax: No acute findings.     ABDOMEN:    Liver: Normal. No mass.    Gallbladder and bile ducts: Normal. No calcified stones. No ductal dilation.    Pancreas: Normal. No ductal dilation.    Spleen: Normal. No splenomegaly.    Adrenals: Normal. No mass.    Kidneys and ureters: Normal. No hydronephrosis.    Stomach and bowel:  Colonic diverticulosis. Segment of wall thickening at the   junction of the sigmoid and descending colon, consistent in appearance with   acute diverticulitis. There is surrounding fat stranding. There are adjacent   small locules of extraluminal air.    Appendix: A normal appendix is identified.    PELVIS:    Bladder: Unremarkable as visualized.    Reproductive: Unremarkable as visualized.     ABDOMEN and PELVIS:    Intraperitoneal space: See above. No abscess.   Bones/joints: No acute fracture. No dislocation.    Soft tissues: Unremarkable.    Vasculature: Normal. No abdominal aortic aneurysm.    Lymph nodes: Normal. No enlarged lymph nodes.       Impression:       Acute perforated diverticulitis at the junction of the sigmoid and descending   colon. No abscess.    THIS DOCUMENT HAS BEEN ELECTRONICALLY SIGNED BY JOSE G TREVIZO MD          I personally read the radiographic studies     IMPRESSION:  1.  Acute sigmoid diverticulitis-with perforation.  I will plan to simplify his antibiotics today to intravenous Invanz.  He should be able to discharge home in the next 1-2 days if he is able to advance and tolerate his diet.    2.  Leukocytosis/neutrophilia-secondary to  above  3.  Elevated LFTs  4.  Diabetes mellitus, type 2       RECOMMENDATIONS:  1.  Discontinue Zosyn   2.  Ertapenem gram IV daily  3.  Possible discharge home tomorrow on IV Invanz    Dr. Vega has obtained the history, performed the physical exam and formulated the above treatment plan.     I discussed his situation with Dr. Dumont and with Dr. Thakur.    Amador Vega MD  3/19/2019  5:29 PM

## 2019-03-19 NOTE — PLAN OF CARE
Problem: Patient Care Overview  Goal: Plan of Care Review  Outcome: Ongoing (interventions implemented as appropriate)   03/18/19 0413 03/18/19 1942 03/19/19 0423   Plan of Care Review   Progress improving --  --    OTHER   Outcome Summary --  --  VSS. Resting most of shift. Incontinent diarrhea 1x. Asked if can have antidiarrheal. APRN defer to attending for the am. Fluids running. Clear liquid diet tolerated. PRNs managed pain. Continue to monitor.   Coping/Psychosocial   Plan of Care Reviewed With --  patient --      Goal: Individualization and Mutuality  Outcome: Ongoing (interventions implemented as appropriate)    Goal: Discharge Needs Assessment  Outcome: Ongoing (interventions implemented as appropriate)    Goal: Interprofessional Rounds/Family Conf  Outcome: Ongoing (interventions implemented as appropriate)      Problem: Infection, Risk/Actual (Adult)  Goal: Identify Related Risk Factors and Signs and Symptoms  Outcome: Ongoing (interventions implemented as appropriate)    Goal: Infection Prevention/Resolution  Outcome: Ongoing (interventions implemented as appropriate)

## 2019-03-20 VITALS
TEMPERATURE: 98.6 F | WEIGHT: 201.13 LBS | BODY MASS INDEX: 29.79 KG/M2 | HEART RATE: 71 BPM | HEIGHT: 69 IN | OXYGEN SATURATION: 100 % | RESPIRATION RATE: 18 BRPM | SYSTOLIC BLOOD PRESSURE: 166 MMHG | DIASTOLIC BLOOD PRESSURE: 99 MMHG

## 2019-03-20 LAB
DEPRECATED RDW RBC AUTO: 48.5 FL (ref 37–54)
ERYTHROCYTE [DISTWIDTH] IN BLOOD BY AUTOMATED COUNT: 13.8 % (ref 11.3–14.5)
GLUCOSE BLDC GLUCOMTR-MCNC: 115 MG/DL (ref 70–130)
GLUCOSE BLDC GLUCOMTR-MCNC: 145 MG/DL (ref 70–130)
GLUCOSE BLDC GLUCOMTR-MCNC: 165 MG/DL (ref 70–130)
GLUCOSE BLDC GLUCOMTR-MCNC: 171 MG/DL (ref 70–130)
HCT VFR BLD AUTO: 36 % (ref 38.9–50.9)
HGB BLD-MCNC: 12.4 G/DL (ref 13.1–17.5)
MAGNESIUM SERPL-MCNC: 1.6 MG/DL (ref 1.3–2.7)
MAGNESIUM SERPL-MCNC: 1.9 MG/DL (ref 1.3–2.7)
MCH RBC QN AUTO: 33.1 PG (ref 27–31)
MCHC RBC AUTO-ENTMCNC: 34.4 G/DL (ref 32–36)
MCV RBC AUTO: 96 FL (ref 80–99)
PLATELET # BLD AUTO: 121 10*3/MM3 (ref 150–450)
PMV BLD AUTO: 10.4 FL (ref 6–12)
RBC # BLD AUTO: 3.75 10*6/MM3 (ref 4.2–5.76)
WBC NRBC COR # BLD: 11.64 10*3/MM3 (ref 3.5–10.8)

## 2019-03-20 PROCEDURE — 85027 COMPLETE CBC AUTOMATED: CPT | Performed by: SURGERY

## 2019-03-20 PROCEDURE — 99239 HOSP IP/OBS DSCHRG MGMT >30: CPT | Performed by: HOSPITALIST

## 2019-03-20 PROCEDURE — 83735 ASSAY OF MAGNESIUM: CPT | Performed by: HOSPITALIST

## 2019-03-20 PROCEDURE — 25010000002 ERTAPENEM PER 500 MG: Performed by: INTERNAL MEDICINE

## 2019-03-20 PROCEDURE — 25010000002 MORPHINE PER 10 MG: Performed by: SURGERY

## 2019-03-20 PROCEDURE — 82962 GLUCOSE BLOOD TEST: CPT

## 2019-03-20 RX ADMIN — ERTAPENEM SODIUM 1 G: 1 INJECTION, POWDER, LYOPHILIZED, FOR SOLUTION INTRAMUSCULAR; INTRAVENOUS at 08:04

## 2019-03-20 RX ADMIN — CLONAZEPAM 0.5 MG: 0.5 TABLET ORAL at 16:17

## 2019-03-20 RX ADMIN — HYDROCODONE BITARTRATE AND ACETAMINOPHEN 1 TABLET: 5; 325 TABLET ORAL at 04:00

## 2019-03-20 RX ADMIN — MORPHINE SULFATE 4 MG: 4 INJECTION INTRAVENOUS at 01:20

## 2019-03-20 RX ADMIN — SODIUM CHLORIDE 125 ML/HR: 9 INJECTION, SOLUTION INTRAVENOUS at 12:20

## 2019-03-20 RX ADMIN — SODIUM CHLORIDE, PRESERVATIVE FREE 10 ML: 5 INJECTION INTRAVENOUS at 08:10

## 2019-03-20 RX ADMIN — DIVALPROEX SODIUM 500 MG: 500 TABLET, DELAYED RELEASE ORAL at 08:05

## 2019-03-20 RX ADMIN — INSULIN HUMAN 2 UNITS: 100 INJECTION, SOLUTION PARENTERAL at 12:20

## 2019-03-20 RX ADMIN — SODIUM CHLORIDE 125 ML/HR: 9 INJECTION, SOLUTION INTRAVENOUS at 04:00

## 2019-03-20 RX ADMIN — FAMOTIDINE 20 MG: 20 TABLET ORAL at 08:05

## 2019-03-20 RX ADMIN — PROPRANOLOL HYDROCHLORIDE 10 MG: 10 TABLET ORAL at 08:09

## 2019-03-20 RX ADMIN — METHYLNALTREXONE BROMIDE 6 MG: 12 INJECTION, SOLUTION SUBCUTANEOUS at 08:05

## 2019-03-20 RX ADMIN — CLONAZEPAM 0.5 MG: 0.5 TABLET ORAL at 08:05

## 2019-03-20 RX ADMIN — POLYETHYLENE GLYCOL 3350 17 G: 17 POWDER, FOR SOLUTION ORAL at 08:06

## 2019-03-20 RX ADMIN — DULOXETINE HYDROCHLORIDE 60 MG: 60 CAPSULE, DELAYED RELEASE ORAL at 08:05

## 2019-03-20 RX ADMIN — INSULIN HUMAN 2 UNITS: 100 INJECTION, SOLUTION PARENTERAL at 00:26

## 2019-03-20 RX ADMIN — MORPHINE SULFATE 4 MG: 4 INJECTION INTRAVENOUS at 06:49

## 2019-03-20 NOTE — DISCHARGE SUMMARY
[facility] Medicine Services  DISCHARGE SUMMARY    Patient Name: Joshua Power  : 1975  MRN: [id number]    Date of Admission: 3/17/2019  Date of Discharge:  3/20/2019 (Wednesday)  Primary Care Physician: Riky Saenz MD    Consults     Date and Time Order Name Status Description    3/18/2019 0100 Inpatient General Surgery Consult Completed         Amador Vega MD - Infectious Diseases  Marco A Dumont MD - General Surgery    Hospital Course     Presenting Problem:   Perforated diverticulum [K57.80]  Perforated diverticulum [K57.80]    Active Hospital Problems    Diagnosis  POA   • Diverticulitis of colon with perforation [K57.20]  Unknown   • HTN (hypertension) [I10]  Unknown   • Type 2 diabetes mellitus (CMS/HCC) [E11.9]  Unknown   • Anemia [D64.9]  Unknown   • Perforated diverticulum [K57.80]  Yes   • CONSTANTIN (obstructive sleep apnea) [G47.33]  Unknown   • Depression [F32.9]  Unknown   • Chronic pain syndrome [G89.4]  Unknown   • Dehydration [E86.0]  Unknown   • Lactic acidosis [E87.2]  Unknown      Resolved Hospital Problems   No resolved problems to display.      Untreated Sleep Apnea  Confusion  Bipolar Disorder  Chronic Anxiety    Hospital Course:  Joshua Power is a 44 y.o. male with history of diverticulitis who presented with acute diverticulitis with perforation.  This was worse than his previous episode of diverticulitis he reports.  He did not have a follow up colonoscopy after the first episode of diverticulitis.  He has been exhibiting behavioral issues and says he sees a psychiatrist for Bipolar disorder.  He threatened to leave the hospital against medical advice on several occasions during this hospital stay.  He is on chronic benzodiazapine (Klonipin).  He was seen by Dr. Berny Dumont for Complicated Diverticulitis.  He was put on IV abx and will be transitioned out of the hospital on IV abx.  Currently the plan is for IV Invanz by [facility].  He has been planned for infusion at [facility], [place], operated by [facility] tomorrow afternoon.    Anticipated plan for 2 weeks of therapy    Discharge Follow Up Recommendations for labs/diagnostics:     infusion center follow up for IV abx - Follow up with Dr. Vega in office on Friday at 1pm.    Day of Discharge     HPI:  Discussed going home today.  Discussed using CPAP at home vs going in for titration study and seeing if needs different equipment.  Asked for permission to call mother who lives out of state.   Threatened leaving AMA yesterday evening until I called and talked to him suggesting that he not leave against medical advice.  His thinking does not appear to be on point.  When asked about pain medication, he does not want pain medication at discharge today.    Review of Systems    Gen- No fevers, chills  CV- No chest pain, palpitations  Resp- No cough, dyspnea  GI- No N/V/D, abd pain    Otherwise ROS is negative except as mentioned in the HPI.    Vital Signs:   Temp:  [98.1 °F (36.7 °C)-98.6 °F (37 °C)] 98.6 °F (37 °C)  Heart Rate:  [59-96] 71  Resp:  [17-18] 18  BP: (137-166)/(79-99) 166/99     Physical Exam:    Gen: NAD  HEENT:  No JVD  CVS:  RRR, s1 and s2  Lungs:  Clear  Abdomen: soft, no guarding, tenderness to palpation, no rebound  Ext:  No pedal edema, + PICC line Right Upper Ext    Pertinent  and/or Most Recent Results     Results from last 7 days   Lab Units 03/20/19  0623 03/19/19  0517 03/18/19  0323 03/17/19  2042   WBC 10*3/mm3 11.64* 15.53* 15.34* 14.09*   HEMOGLOBIN g/dL 12.4* 13.4 12.8* 15.6   HEMATOCRIT % 36.0* 40.3 37.9* 44.6   PLATELETS 10*3/mm3 121* 138* 130* 179   SODIUM mmol/L  --  137 139 132   POTASSIUM mmol/L  --  4.4 3.7 5.5   CHLORIDE mmol/L  --  104 104 98*   CO2 mmol/L  --  26.0 26.0 23.0   BUN mg/dL  --  9 14 15   CREATININE mg/dL  --  1.02 0.97 1.13   GLUCOSE mg/dL  --  139* 153* 253*   CALCIUM mg/dL  --  9.4 8.1* 8.8     Results from last 7 days   Lab Units  03/17/19  2042   BILIRUBIN mg/dL 0.5   ALK PHOS U/L 47   ALT (SGPT) U/L 53*   AST (SGOT) U/L 137*     Results from last 7 days   Lab Units 03/18/19  0323   HEMOGLOBIN A1C % 8.80*     Brief Urine Lab Results  (Last result in the past 365 days)      Color   Clarity   Blood   Leuk Est   Nitrite   Protein   CREAT   Urine HCG        03/17/19 2338 Yellow Clear Negative Negative Negative Negative             Microbiology Results Abnormal     Procedure Component Value - Date/Time    Blood Culture - Blood, Arm, Left [[id number]] Collected:  03/17/19 2325    Lab Status:  Preliminary result Specimen:  Blood from Arm, Left Updated:  03/20/19 0000     Blood Culture No growth at 2 days    Blood Culture - Blood, Arm, Left [[id number]] Collected:  03/17/19 2325    Lab Status:  Preliminary result Specimen:  Blood from Arm, Left Updated:  03/20/19 0000     Blood Culture No growth at 2 days        Imaging Results (all)     Procedure Component Value Units Date/Time    CT Abdomen Pelvis With Contrast [[id number]] Collected:  03/17/19 2144     Updated:  03/17/19 2242    Narrative:       EXAM:    CT Abdomen and Pelvis With Contrast     EXAM DATE/TIME:    3/17/2019 9:44 PM     CLINICAL HISTORY:    44 years old, male; Pain; Abdominal pain; Localized; Lower; Patient HX: Low   abd pain, diarrhea     TECHNIQUE:    Axial computed tomography images of the abdomen and pelvis with intravenous   contrast.    All CT scans at this facility use at least one of these dose optimization   techniques: automated exposure control; mA and/or kV adjustment per patient   size (includes targeted exams where dose is matched to clinical indication); or   iterative reconstruction.    Coronal reformatted images were created and reviewed.     CONTRAST:    Contrast Material: 100 ml of iso 300; Contrast Route: existing     COMPARISON:    No relevant prior studies available.     FINDINGS:    Lower thorax: No acute findings.     ABDOMEN:    Liver: Normal. No mass.     Gallbladder and bile ducts: Normal. No calcified stones. No ductal dilation.    Pancreas: Normal. No ductal dilation.    Spleen: Normal. No splenomegaly.    Adrenals: Normal. No mass.    Kidneys and ureters: Normal. No hydronephrosis.    Stomach and bowel:  Colonic diverticulosis. Segment of wall thickening at the   junction of the sigmoid and descending colon, consistent in appearance with   acute diverticulitis. There is surrounding fat stranding. There are adjacent   small locules of extraluminal air.    Appendix: A normal appendix is identified.    PELVIS:    Bladder: Unremarkable as visualized.    Reproductive: Unremarkable as visualized.     ABDOMEN and PELVIS:    Intraperitoneal space: See above. No abscess.   Bones/joints: No acute fracture. No dislocation.    Soft tissues: Unremarkable.    Vasculature: Normal. No abdominal aortic aneurysm.    Lymph nodes: Normal. No enlarged lymph nodes.       Impression:       Acute perforated diverticulitis at the junction of the sigmoid and descending   colon. No abscess.    THIS DOCUMENT HAS BEEN ELECTRONICALLY SIGNED BY JOSE G TREVIZO MD         Order Current Status    Blood Culture - Blood, Arm, Left Preliminary result    Blood Culture - Blood, Arm, Left Preliminary result        Discharge Details        Discharge Medications      New Medications      Instructions Start Date   ertapenem 1 gm/100ml solution IV  Commonly known as:  INVanz   1 g, Intravenous, Every 24 Hours   Start Date:  3/21/2019        Continue These Medications      Instructions Start Date   clonazePAM 0.5 MG tablet  Commonly known as:  KlonoPIN   0.5 mg, Oral, 2 Times Daily, TAKE 0.5MG IN AM, TAKE 1MG DAILY AT 4PM       DEPAKOTE 500 MG DR tablet  Generic drug:  divalproex   500 mg, Oral, 2 Times Daily      DULoxetine 20 MG capsule  Commonly known as:  CYMBALTA   60 mg, Oral, 2 Times Daily      ferrous gluconate 324 (37.5 Fe) MG tablet tablet   1 mg, Oral, Daily With Breakfast      insulin lispro 100  UNIT/ML injection  Commonly known as:  humaLOG   26 Units, Subcutaneous, 2 Times Daily      propranolol 10 MG tablet  Commonly known as:  INDERAL   10 mg, Oral, Daily      traZODone 150 MG tablet  Commonly known as:  DESYREL   150 mg, Oral, Nightly           Allergies   Allergen Reactions   • Nsaids GI Bleeding     Discharge Disposition:  Home or Self Care    Discharge Diet:  Diet Order   Procedures   • Diet Full Liquid     Discharge Activity: as tolerated    CODE STATUS:    Code Status and Medical Interventions:   Ordered at: 03/18/19 0001     Code Status:    CPR     Medical Interventions (Level of Support Prior to Arrest):    Full     Future Appointments   Date Time Provider Department Center   3/21/2019  3:30 PM CHAIR 9 [facility]   3/22/2019  2:00 PM CHAIR 16 [facility]   3/23/2019  9:00 AM CHAIR 4 [facility]   3/24/2019  9:00 AM CHAIR 4 [facility]   3/25/2019  3:30 PM CHAIR 6 [facility]   3/26/2019  3:30 PM CHAIR 16 [facility]   3/27/2019  3:30 PM CHAIR 14 [facility]   3/28/2019  3:30 PM ROOM A [facility]   3/29/2019  3:30 PM CHAIR 16 [facility]   3/30/2019  9:00 AM CHAIR 14 [facility]   3/31/2019  9:00 AM CHAIR 3 [facility]   4/1/2019  3:30 PM CHAIR 18 [facility]   4/2/2019  3:30 PM CHAIR 5 [facility]   4/3/2019  3:30 PM CHAIR 17 [facility]     Additional Instructions for the Follow-ups that You Need to Schedule     Discharge Follow-up with PCP   As directed       Currently Documented PCP:    Riky Saenz MD    PCP Phone Number:    [phone]     Follow Up Details:  Primary Care Doctor - Riky Saenz MD - 1 week         Discharge Follow-up with Specialty: Infectious Diseases - Amador Vega; 2 Days   As directed      Specialty:  Infectious Diseases - Amador Vega    Follow Up:  2 Days    Follow Up Details:  planned for office follow up with Dr. Vega on Friday         Discharge Follow-up with Specialty: Outpatient Infusion  Center; 1 Day   As directed      Specialty:  Outpatient Infusion Center    Follow Up:  1 Day    Follow Up Details:  Outpatient Infusion Center - arranged by ID / CM             Discussed plan for IV abx by PICC line.  Discussed case with Dr. Vega on the day of discharge.    Discussed case with Mother - Sana - who lives out of state - on the day of discharge with permission from patient.    Time Spent on Discharge:  43  minutes    Electronically signed by Chaim Thakur MD, 03/20/19, 4:51 PM.

## 2019-03-20 NOTE — PROGRESS NOTES
Case Management Discharge Note    Final Note: Patient tells me he has transportation to infusion and to [facility] appointments (drives self). [facility] arranged and placed in AVS. Patient is agreeable with this plan. I have reminded patient also about [facility] follow up with his CPAP.     Destination      No service has been selected for the patient.      Durable Medical Equipment      No service has been selected for the patient.      Dialysis/Infusion      No service has been selected for the patient.      Home Medical Care      No service has been selected for the patient.      Community Resources      No service has been selected for the patient.             Final Discharge Disposition Code: 01 - home or self-care

## 2019-03-20 NOTE — PLAN OF CARE
Problem: Patient Care Overview  Goal: Plan of Care Review  Outcome: Ongoing (interventions implemented as appropriate)   03/20/19 2485   Plan of Care Review   Progress improving   OTHER   Outcome Summary VSS. Adequate I&O. Initially upset about pain med regimen. Pt calmed down after explaination. c/o HA and abd pain. Medicated for pain with [place] & Morphine. Recheck mg in am. Possible DC in next 1-2 days with IV abx. Rested some.    Coping/Psychosocial   Plan of Care Reviewed With patient       Problem: Infection, Risk/Actual (Adult)  Goal: Identify Related Risk Factors and Signs and Symptoms  Outcome: Outcome(s) achieved Date Met: 03/20/19    Goal: Infection Prevention/Resolution  Outcome: Ongoing (interventions implemented as appropriate)      Problem: Diabetes, Type 2 (Adult)  Goal: Signs and Symptoms of Listed Potential Problems Will be Absent, Minimized or Managed (Diabetes, Type 2)  Outcome: Ongoing (interventions implemented as appropriate)

## 2019-03-20 NOTE — PROGRESS NOTES
"[facility] Progress Note    Admission Date: 3/17/2019    Joshua Power  1975  [id number]    Date: 3/20/2019    Meds:    Anti-Infectives (From admission, onward)    Ordered     Dose/Rate Route Frequency Start Stop    19 0744  ertapenem (INVanz) 1 g/100 mL 0.9% NS VTB (mbp)     Ordering Provider:  Amador Vega MD    1 g  over 30 Minutes Intravenous Every 24 Hours 19 0900 19 0859    19 2306  piperacillin-tazobactam (ZOSYN) 4.5 g in iso-osmotic dextrose 100 mL IVPB (premix)     Ordering Provider:  Leidy Osborne APRN    4.5 g  over 30 Minutes Intravenous Once 19 2308 19 0032          CC:  Acute diverticulitis with perforation         SUBJECTIVE: 3/18/19:  Patient is a 44 y.o. male, seen today for acute diverticulitis with perforation.  He presented to the ED with sharp bilateral pelvic pain.  CT scan with perorated diverticulitis, no abscess.  He had a previous episode of diverticulitis last year treated at . He did not have a follow up colonoscopy.  Admitting labs with a leukocytosis of 14, 000, elevated LFTs,  Lactic acidosis, and he has been afebrile.  He denies any fever, but has been experiencing chills.   3/19/19:  He feels terrible.  Pain medicine is not helping \" one bit\".  He complains of terrible headache, and abdominal pain.  He complains of night sweats.   3/20/19: He feels a lot better this am.  His abdominal pain and headache are almost gone.  He is still on clears.  He remains afebrile.         ROS:  No f/c/s. No n/v/d. No rash. No new ADR to Abx.     PE:   Vital Signs  Temp (24hrs), Av.2 °F (36.8 °C), Min:98.1 °F (36.7 °C), Max:98.4 °F (36.9 °C)    Temp  Min: 98.1 °F (36.7 °C)  Max: 98.4 °F (36.9 °C)  BP  Min: 136/84  Max: 158/85  Pulse  Min: 59  Max: 96  Resp  Min: 16  Max: 18  SpO2  Min: 95 %  Max: 100 %    GENERAL: Awake and alert, in no acute distress.   HEENT:  No conjunctival injection. No icterus. Oropharynx clear without evidence of thrush or " exudate.  NECK: Supple, no JVD     HEART: RRR; No murmur, rubs, gallops.   LUNGS: Clear to auscultation bilaterally without wheezing, rales, rhonchi. Normal respiratory effort. Nonlabored. No dullness.  ABDOMEN: Soft, decreased tenderness  nondistended. Positive bowel sounds. No rebound or guarding. NO mass or HSM.  EXT:  No cyanosis, clubbing or edema. No cord.  : Genitalia generally unremarkable.  Without Puente catheter.  SKIN: Warm and dry without cutaneous eruptions on Inspection/palpation.    NEURO: Alert and oriented x 3, Motor 5/5 bilaterally    Laboratory Data    Results from last 7 days   Lab Units 03/20/19  0623 03/19/19  0517 03/18/19  0323   WBC 10*3/mm3 11.64* 15.53* 15.34*   HEMOGLOBIN g/dL 12.4* 13.4 12.8*   HEMATOCRIT % 36.0* 40.3 37.9*   PLATELETS 10*3/mm3 121* 138* 130*     Results from last 7 days   Lab Units 03/19/19  0517   SODIUM mmol/L 137   POTASSIUM mmol/L 4.4   CHLORIDE mmol/L 104   CO2 mmol/L 26.0   BUN mg/dL 9   CREATININE mg/dL 1.02   GLUCOSE mg/dL 139*   CALCIUM mg/dL 9.4     Results from last 7 days   Lab Units 03/17/19  2042   ALK PHOS U/L 47   BILIRUBIN mg/dL 0.5   ALT (SGPT) U/L 53*   AST (SGOT) U/L 137*             Results from last 7 days   Lab Units 03/19/19  0517   LACTATE mmol/L 1.2             Estimated Creatinine Clearance: 103.1 mL/min (by C-G formula based on SCr of 1.02 mg/dL).    Microbiology:  Blood Culture   Date Value Ref Range Status   03/17/2019 No growth at 24 hours  Preliminary   03/17/2019 No growth at 24 hours  Preliminary                            Radiology:  Imaging Results (last 72 hours)     Procedure Component Value Units Date/Time    CT Abdomen Pelvis With Contrast [[id number]] Collected:  03/17/19 2144     Updated:  03/17/19 2242    Narrative:       EXAM:    CT Abdomen and Pelvis With Contrast     EXAM DATE/TIME:    3/17/2019 9:44 PM     CLINICAL HISTORY:    44 years old, male; Pain; Abdominal pain; Localized; Lower; Patient HX: Low   abd pain, diarrhea      TECHNIQUE:    Axial computed tomography images of the abdomen and pelvis with intravenous   contrast.    All CT scans at this facility use at least one of these dose optimization   techniques: automated exposure control; mA and/or kV adjustment per patient   size (includes targeted exams where dose is matched to clinical indication); or   iterative reconstruction.    Coronal reformatted images were created and reviewed.     CONTRAST:    Contrast Material: 100 ml of iso 300; Contrast Route: existing     COMPARISON:    No relevant prior studies available.     FINDINGS:    Lower thorax: No acute findings.     ABDOMEN:    Liver: Normal. No mass.    Gallbladder and bile ducts: Normal. No calcified stones. No ductal dilation.    Pancreas: Normal. No ductal dilation.    Spleen: Normal. No splenomegaly.    Adrenals: Normal. No mass.    Kidneys and ureters: Normal. No hydronephrosis.    Stomach and bowel:  Colonic diverticulosis. Segment of wall thickening at the   junction of the sigmoid and descending colon, consistent in appearance with   acute diverticulitis. There is surrounding fat stranding. There are adjacent   small locules of extraluminal air.    Appendix: A normal appendix is identified.    PELVIS:    Bladder: Unremarkable as visualized.    Reproductive: Unremarkable as visualized.     ABDOMEN and PELVIS:    Intraperitoneal space: See above. No abscess.   Bones/joints: No acute fracture. No dislocation.    Soft tissues: Unremarkable.    Vasculature: Normal. No abdominal aortic aneurysm.    Lymph nodes: Normal. No enlarged lymph nodes.       Impression:       Acute perforated diverticulitis at the junction of the sigmoid and descending   colon. No abscess.    THIS DOCUMENT HAS BEEN ELECTRONICALLY SIGNED BY JOSE G TREVIZO MD          I personally read the radiographic studies     IMPRESSION:  1.  Acute sigmoid diverticulitis-with perforation.  He is clinically improved.  We will plan to advance his diet.  He should  be able to discharge to home later today.  We will arrange for him to infuse at [facility].    2.  Leukocytosis/neutrophilia-secondary to above  3.  Elevated LFTs  4.  Diabetes mellitus, type 2       RECOMMENDATIONS:  1.  Ertapenem gram IV daily  2.  Possible discharge home today  on IV Invanz  3.  Advance diet      UM/ALISSA: He is clinically better and should be medically stable for discharge later today as long as he tolerates and advanced in his diet.  He has been emotionally labile and there is some question about his potential for noncompliance with antibiotic therapy.  I will plan for him initially to infuse at [facility] outpatient onc[facility] in order to maintain closer control over his compliance.  I will plan to see him in follow-up on Friday 3/22 at 1:00.  I discussed his disposition in detail with the nursing staff, Dr. Thakur, and the patient himself.    Dr. Vega has obtained the history, performed the physical exam and formulated the above treatment plan.     I spent over 35 minutes on his care today.  Outpatient orders:  1.  Fax this page to [phone]  2.  Outpatient intravenous antibiotic therapy: Invanz 1 g IV daily at [facility] outpatient oncology-with an anticipated duration of 2 weeks  3.  CBC, CMP, ESR, and CRP every Monday  4.  Appointment to see me on Friday 3/22 at 1:00-this appointment has been made  5.  Change the PICC line dressing weekly with a Tegaderm CHG gel dressing      Amador Vega MD  3/20/2019  11:56 AM

## 2019-03-20 NOTE — PROGRESS NOTES
"General Surgery Daily Progress Note    Subjective:  Feeling better.  Home today.    Objective:  /99 Comment: NURSE NOTIFIED  Pulse 71   Temp 98.6 °F (37 °C)   Resp 18   Ht 175.3 cm (69\")   Wt 91.2 kg (201 lb 2 oz)   SpO2 100%   BMI 29.70 kg/m²       General Appearance: NAD  Eyes: Anicteric  Neck: Trachea midline   Cardiovascular:  RRR without murmur nor rub  Lungs:  Bilateral respirations unlabored   Abdomen:  Soft, non-tender, no masses, no organomegaly   Extremities:  No cyanosis or edema   Skin:  No obvious rashes   Neurologic: awake and conversant       Imaging Results (last 24 hours)     ** No results found for the last 24 hours. **          CBC:  Results from last 7 days   Lab Units 03/20/19  0623   WBC 10*3/mm3 11.64*   HEMOGLOBIN g/dL 12.4*   HEMATOCRIT % 36.0*   PLATELETS 10*3/mm3 121*       CMP:  Results from last 7 days   Lab Units 03/19/19  0517  03/17/19  2042   SODIUM mmol/L 137   < > 132   POTASSIUM mmol/L 4.4   < > 5.5   CHLORIDE mmol/L 104   < > 98*   CO2 mmol/L 26.0   < > 23.0   BUN mg/dL 9   < > 15   CREATININE mg/dL 1.02   < > 1.13   CALCIUM mg/dL 9.4   < > 8.8   BILIRUBIN mg/dL  --   --  0.5   ALK PHOS U/L  --   --  47   ALT (SGPT) U/L  --   --  53*   AST (SGOT) U/L  --   --  137*   GLUCOSE mg/dL 139*   < > 253*    < > = values in this interval not displayed.         Assessment:  Perforated diverticulitis    Plan:   Advance diet to Gi soft.  Miralax daily  Follow up at [facility] 2-3 weeks    Marco A Dumont MD - 3/20/2019, 5:41 PM        "

## 2019-03-20 NOTE — CONSULTS
"                  Clinical Nutrition     Nutrition Assessment  Reason for Visit:   Physician consult, Need for education      Patient Name: Joshua Power  YOB: 1975  MRN: [id number]  Date of Encounter: 03/20/19 11:13 AM  Admission date: 3/17/2019    Nutrition Assessment   Assessment     Admission Problem  Diverticulitis of colon with perforation    Hospital Problem List  Chronic pain syndrome  Dehydration  Lactic acidosis      Applicable PMH  HTN  T2DM  Anemia  Depression  DISH  Back injury    Reported/Observed/Food/Nutrition Related History:     RD received consult from MD that patient would like to receive nutrition education pertaining to controlling his T2DM. RD provided verbal and written education materials to patient. Patient stated he is able to control his weight better in the summer months due to being more physically active. He stated that he knows what he should be doing he just needs to be better about tracking carbohydrates, choosing smarter foods.     RD provided patient with  Markos Oupatient RD information should he need more guidance/education after discharge.     Anthropometrics     Height: 175.3 cm (69\")  Last filed wt: Weight: 91.2 kg (201 lb 2 oz) (03/18/19 0037)  Weight Method: Standing scale    BMI: BMI (Calculated): 29.7  Overweight: 25.0-29.9kg/m2     Ideal Body Weight (IBW) (kg): 73.69  Admission wt: 201 lb 2 oz  Method obtained: standing scale (3/18)    Labs reviewed     Results from last 7 days   Lab Units 03/20/19  0623 03/20/19  0017 03/19/19  0517 03/18/19  0323 03/17/19  2042   GLUCOSE mg/dL  --   --  139* 153* 253*   BUN mg/dL  --   --  9 14 15   CREATININE mg/dL  --   --  1.02 0.97 1.13   SODIUM mmol/L  --   --  137 139 132   CHLORIDE mmol/L  --   --  104 104 98*   POTASSIUM mmol/L  --   --  4.4 3.7 5.5   MAGNESIUM mg/dL 1.6 1.9 1.6  --   --    ALT (SGPT) U/L  --   --   --   --  53*     Results from last 7 days   Lab Units 03/17/19  2042   ALBUMIN g/dL 5.04* "     Results from last 7 days   Lab Units 03/20/19  0621 03/20/19  0008 03/19/19  1644 03/19/19  1110 03/19/19  0514 03/18/19  2350   GLUCOSE mg/dL 145* 171* 177* 157* 128 174*     Lab Results   Lab Value Date/Time    HGBA1C 8.80 (H) 03/18/2019 0323     Medications reviewed   Pertinent: depakote, insulin, pepcid, IVF @ 125 ml/hr    Current Nutrition Prescription     PO: Diet Clear Liquid    Oral Nutrition Supplements: Boost Breeze BID    Intake: 8% of 3 clear liquid trays    Nutrition Diagnosis     3/20  Problem Altered nutrition related laboratory values   Etiology Poorly controlled T2DM   Signs/Symptoms A1c= 8.8%     3/20  Problem Food and nutrition knowledge deficit   Etiology T2DM   Signs/Symptoms Pt requested education pertaining to T2DM     3/20  Problem Inadequate Oral Intake   Etiology Perforation of diverticulum   Signs/Symptoms Clear liquids; 8% of 3 meals       Nutrition Intervention   1.  Follow treatment progress, Care plan reviewed  2. Encourage intake, Education provided   3. RD provided pt with education materials prepared by the [employer] pertaining to T2DM nutrition and carbohydrate counting.     Goal:   General: Nutrition support treatment, Improve nutrition related labs, Provide information regarding MNT therapy  PO: Increase intake, Advance diet as medically appropriate  Additional goals: patient is able to consume > or equal to 67% of meal tray.     Monitoring/Evaluation:   Per protocol, PO intake, Supplement intake, GI status, Symptoms      Will Continue to follow per protocol      Lani Vale RD  Time Spent: 45 minutes

## 2019-03-21 ENCOUNTER — HOSPITAL ENCOUNTER (OUTPATIENT)
Dept: ONCOLOGY | Facility: HOSPITAL | Age: 44
Setting detail: INFUSION SERIES
Discharge: HOME | End: 2019-03-21

## 2019-03-21 ENCOUNTER — READMISSION MANAGEMENT (OUTPATIENT)
Dept: CALL CENTER | Facility: HOSPITAL | Age: 44
End: 2019-03-21

## 2019-03-21 VITALS
RESPIRATION RATE: 20 BRPM | SYSTOLIC BLOOD PRESSURE: 147 MMHG | DIASTOLIC BLOOD PRESSURE: 88 MMHG | HEIGHT: 69 IN | BODY MASS INDEX: 28.14 KG/M2 | WEIGHT: 190 LBS | HEART RATE: 113 BPM | TEMPERATURE: 97.8 F

## 2019-03-21 DIAGNOSIS — K57.20 DIVERTICULITIS OF LARGE INTESTINE WITH PERFORATION, UNSPECIFIED BLEEDING STATUS: Primary | ICD-10-CM

## 2019-03-21 PROCEDURE — 25010000002 ERTAPENEM PER 500 MG: Performed by: HOSPITALIST

## 2019-03-21 PROCEDURE — 96365 THER/PROPH/DIAG IV INF INIT: CPT

## 2019-03-21 RX ADMIN — ERTAPENEM SODIUM 1 G: 1 INJECTION, POWDER, LYOPHILIZED, FOR SOLUTION INTRAMUSCULAR; INTRAVENOUS at 12:50

## 2019-03-21 NOTE — OUTREACH NOTE
Prep Survey      Responses   Facility patient discharged from?  [place]   Is patient eligible?  Yes   Discharge diagnosis  Perforated diverticulum    Does the patient have one of the following disease processes/diagnoses(primary or secondary)?  Other   Does the patient have Home health ordered?  Yes   What is the Home health agency?   [facility]   Is there a DME ordered?  Yes   What DME was ordered?  [facility]   Prep survey completed?  Yes          Diane Dyer RN

## 2019-03-22 ENCOUNTER — READMISSION MANAGEMENT (OUTPATIENT)
Dept: CALL CENTER | Facility: HOSPITAL | Age: 44
End: 2019-03-22

## 2019-03-22 ENCOUNTER — HOSPITAL ENCOUNTER (OUTPATIENT)
Dept: ONCOLOGY | Facility: HOSPITAL | Age: 44
Setting detail: INFUSION SERIES
Discharge: HOME | End: 2019-03-22

## 2019-03-22 VITALS
TEMPERATURE: 97.4 F | WEIGHT: 194 LBS | DIASTOLIC BLOOD PRESSURE: 73 MMHG | HEIGHT: 69 IN | HEART RATE: 80 BPM | BODY MASS INDEX: 28.73 KG/M2 | RESPIRATION RATE: 18 BRPM | SYSTOLIC BLOOD PRESSURE: 117 MMHG

## 2019-03-22 DIAGNOSIS — K57.20 DIVERTICULITIS OF LARGE INTESTINE WITH PERFORATION, UNSPECIFIED BLEEDING STATUS: Primary | ICD-10-CM

## 2019-03-22 PROCEDURE — 96365 THER/PROPH/DIAG IV INF INIT: CPT

## 2019-03-22 PROCEDURE — 25010000002 ERTAPENEM PER 500 MG: Performed by: HOSPITALIST

## 2019-03-22 RX ADMIN — ERTAPENEM SODIUM 1 G: 1 INJECTION, POWDER, LYOPHILIZED, FOR SOLUTION INTRAMUSCULAR; INTRAVENOUS at 14:06

## 2019-03-22 NOTE — OUTREACH NOTE
Medical Week 1 Survey      Responses   Facility patient discharged from?  [place]   Does the patient have one of the following disease processes/diagnoses(primary or secondary)?  Other   Is there a successful TCM telephone encounter documented?  No   Week 1 attempt successful?  Yes   Call start time  1206   Call end time  1208   Discharge diagnosis  Perforated diverticulum    Meds reviewed with patient/caregiver?  Yes   Is the patient having any side effects they believe may be caused by any medication additions or changes?  No   Does the patient have all medications ordered at discharge?  Yes   Is the patient taking all medications as directed (includes completed medication regime)?  Yes   Does the patient have a primary care provider?   Yes   Does the patient have an appointment with their PCP within 7 days of discharge?  Yes   Has the patient kept scheduled appointments due by today?  Yes   Has home health visited the patient within 72 hours of discharge?  N/A   What DME was ordered?  [facility] CPAP   Has all DME been delivered?  No   DME comments  No CPAP yet. Advised that he speak to PCP and see if he needs a sleep study.   Psychosocial issues?  No   Did the patient receive a copy of their discharge instructions?  Yes   Nursing interventions  Reviewed instructions with patient   What is the patient's perception of their health status since discharge?  Improving   Is the patient/caregiver able to teach back signs and symptoms related to disease process for when to call PCP?  Yes   Is the patient/caregiver able to teach back signs and symptoms related to disease process for when to call 911?  Yes   Is the patient/caregiver able to teach back the hierarchy of who to call/visit for symptoms/problems? PCP, Specialist, Home health nurse, Urgent Care, ED, 911  Yes   Week 1 call completed?  Yes          Berny Tsocano RN

## 2019-03-23 ENCOUNTER — HOSPITAL ENCOUNTER (OUTPATIENT)
Dept: ONCOLOGY | Facility: HOSPITAL | Age: 44
Setting detail: INFUSION SERIES
Discharge: HOME | End: 2019-03-23

## 2019-03-23 VITALS
SYSTOLIC BLOOD PRESSURE: 114 MMHG | HEART RATE: 74 BPM | DIASTOLIC BLOOD PRESSURE: 76 MMHG | TEMPERATURE: 98 F | RESPIRATION RATE: 16 BRPM

## 2019-03-23 DIAGNOSIS — K57.20 DIVERTICULITIS OF LARGE INTESTINE WITH PERFORATION, UNSPECIFIED BLEEDING STATUS: Primary | ICD-10-CM

## 2019-03-23 LAB
BACTERIA SPEC AEROBE CULT: NORMAL
BACTERIA SPEC AEROBE CULT: NORMAL

## 2019-03-23 PROCEDURE — 25010000002 ERTAPENEM PER 500 MG: Performed by: HOSPITALIST

## 2019-03-23 PROCEDURE — 96365 THER/PROPH/DIAG IV INF INIT: CPT

## 2019-03-23 RX ADMIN — ERTAPENEM SODIUM 1 G: 1 INJECTION, POWDER, LYOPHILIZED, FOR SOLUTION INTRAMUSCULAR; INTRAVENOUS at 09:29

## 2019-03-24 ENCOUNTER — LAB (OUTPATIENT)
Dept: LAB | Facility: HOSPITAL | Age: 44
End: 2019-03-24

## 2019-03-24 ENCOUNTER — HOSPITAL ENCOUNTER (OUTPATIENT)
Dept: ONCOLOGY | Facility: HOSPITAL | Age: 44
Setting detail: INFUSION SERIES
Discharge: HOME | End: 2019-03-24

## 2019-03-24 ENCOUNTER — TRANSCRIBE ORDERS (OUTPATIENT)
Dept: LAB | Facility: HOSPITAL | Age: 44
End: 2019-03-24

## 2019-03-24 DIAGNOSIS — K57.20 DIVERTICULITIS OF LARGE INTESTINE WITH PERFORATION, UNSPECIFIED BLEEDING STATUS: Primary | ICD-10-CM

## 2019-03-24 DIAGNOSIS — K57.20 PERFORATED DIVERTICULUM OF LARGE INTESTINE: Primary | ICD-10-CM

## 2019-03-24 DIAGNOSIS — K57.20 PERFORATED DIVERTICULUM OF LARGE INTESTINE: ICD-10-CM

## 2019-03-24 LAB
ALBUMIN SERPL-MCNC: 4.73 G/DL (ref 3.2–4.8)
ALBUMIN/GLOB SERPL: 1.6 G/DL (ref 1.5–2.5)
ALP SERPL-CCNC: 79 U/L (ref 25–100)
ALT SERPL W P-5'-P-CCNC: 34 U/L (ref 7–40)
ANION GAP SERPL CALCULATED.3IONS-SCNC: 10 MMOL/L (ref 3–11)
AST SERPL-CCNC: 18 U/L (ref 0–33)
BASOPHILS # BLD AUTO: 0.05 10*3/MM3 (ref 0–0.2)
BASOPHILS NFR BLD AUTO: 0.4 % (ref 0–1)
BILIRUB SERPL-MCNC: 0.4 MG/DL (ref 0.3–1.2)
BUN BLD-MCNC: 18 MG/DL (ref 9–23)
BUN/CREAT SERPL: 17.1 (ref 7–25)
CALCIUM SPEC-SCNC: 10.1 MG/DL (ref 8.7–10.4)
CHLORIDE SERPL-SCNC: 102 MMOL/L (ref 99–109)
CO2 SERPL-SCNC: 27 MMOL/L (ref 20–31)
CREAT BLD-MCNC: 1.05 MG/DL (ref 0.6–1.3)
CRP SERPL-MCNC: 5.31 MG/DL (ref 0–1)
DEPRECATED RDW RBC AUTO: 50.5 FL (ref 37–54)
EOSINOPHIL # BLD AUTO: 0.14 10*3/MM3 (ref 0–0.3)
EOSINOPHIL NFR BLD AUTO: 1.2 % (ref 0–3)
ERYTHROCYTE [DISTWIDTH] IN BLOOD BY AUTOMATED COUNT: 14 % (ref 11.3–14.5)
ERYTHROCYTE [SEDIMENTATION RATE] IN BLOOD: 53 MM/HR (ref 0–15)
GFR SERPL CREATININE-BSD FRML MDRD: 77 ML/MIN/1.73
GLOBULIN UR ELPH-MCNC: 3 GM/DL
GLUCOSE BLD-MCNC: 225 MG/DL (ref 70–100)
HCT VFR BLD AUTO: 44.1 % (ref 38.9–50.9)
HGB BLD-MCNC: 14.8 G/DL (ref 13.1–17.5)
IMM GRANULOCYTES # BLD AUTO: 0.12 10*3/MM3 (ref 0–0.05)
IMM GRANULOCYTES NFR BLD AUTO: 1 % (ref 0–0.6)
LYMPHOCYTES # BLD AUTO: 2.57 10*3/MM3 (ref 0.6–4.8)
LYMPHOCYTES NFR BLD AUTO: 22 % (ref 24–44)
MCH RBC QN AUTO: 33 PG (ref 27–31)
MCHC RBC AUTO-ENTMCNC: 33.6 G/DL (ref 32–36)
MCV RBC AUTO: 98.4 FL (ref 80–99)
MONOCYTES # BLD AUTO: 1.01 10*3/MM3 (ref 0–1)
MONOCYTES NFR BLD AUTO: 8.7 % (ref 0–12)
NEUTROPHILS # BLD AUTO: 7.9 10*3/MM3 (ref 1.5–8.3)
NEUTROPHILS NFR BLD AUTO: 67.7 % (ref 41–71)
PLATELET # BLD AUTO: 266 10*3/MM3 (ref 150–450)
PMV BLD AUTO: 10 FL (ref 6–12)
POTASSIUM BLD-SCNC: 4.3 MMOL/L (ref 3.5–5.5)
PROT SERPL-MCNC: 7.7 G/DL (ref 5.7–8.2)
RBC # BLD AUTO: 4.48 10*6/MM3 (ref 4.2–5.76)
SODIUM BLD-SCNC: 139 MMOL/L (ref 132–146)
WBC NRBC COR # BLD: 11.67 10*3/MM3 (ref 3.5–10.8)

## 2019-03-24 PROCEDURE — 25010000002 ERTAPENEM PER 500 MG: Performed by: HOSPITALIST

## 2019-03-24 PROCEDURE — 85025 COMPLETE CBC W/AUTO DIFF WBC: CPT

## 2019-03-24 PROCEDURE — 86140 C-REACTIVE PROTEIN: CPT | Performed by: INTERNAL MEDICINE

## 2019-03-24 PROCEDURE — 80053 COMPREHEN METABOLIC PANEL: CPT

## 2019-03-24 PROCEDURE — 36415 COLL VENOUS BLD VENIPUNCTURE: CPT | Performed by: INTERNAL MEDICINE

## 2019-03-24 PROCEDURE — 85652 RBC SED RATE AUTOMATED: CPT

## 2019-03-24 PROCEDURE — 96365 THER/PROPH/DIAG IV INF INIT: CPT

## 2019-03-24 RX ADMIN — ERTAPENEM SODIUM 1 G: 1 INJECTION, POWDER, LYOPHILIZED, FOR SOLUTION INTRAMUSCULAR; INTRAVENOUS at 09:48

## 2019-03-25 ENCOUNTER — HOSPITAL ENCOUNTER (OUTPATIENT)
Dept: ONCOLOGY | Facility: HOSPITAL | Age: 44
Setting detail: INFUSION SERIES
Discharge: HOME | End: 2019-03-25

## 2019-03-25 VITALS
HEART RATE: 94 BPM | RESPIRATION RATE: 16 BRPM | SYSTOLIC BLOOD PRESSURE: 125 MMHG | BODY MASS INDEX: 28.2 KG/M2 | TEMPERATURE: 97.4 F | WEIGHT: 197 LBS | DIASTOLIC BLOOD PRESSURE: 83 MMHG | HEIGHT: 70 IN

## 2019-03-25 DIAGNOSIS — K57.20 DIVERTICULITIS OF LARGE INTESTINE WITH PERFORATION, UNSPECIFIED BLEEDING STATUS: Primary | ICD-10-CM

## 2019-03-25 PROCEDURE — 25010000002 ERTAPENEM PER 500 MG: Performed by: HOSPITALIST

## 2019-03-25 PROCEDURE — 96365 THER/PROPH/DIAG IV INF INIT: CPT

## 2019-03-25 RX ADMIN — SODIUM CHLORIDE 1 G: 9 INJECTION, SOLUTION INTRAVENOUS at 15:40

## 2019-03-26 ENCOUNTER — HOSPITAL ENCOUNTER (OUTPATIENT)
Dept: ONCOLOGY | Facility: HOSPITAL | Age: 44
Setting detail: INFUSION SERIES
Discharge: HOME | End: 2019-03-26

## 2019-03-26 VITALS
WEIGHT: 190 LBS | RESPIRATION RATE: 20 BRPM | HEART RATE: 99 BPM | BODY MASS INDEX: 27.2 KG/M2 | TEMPERATURE: 97 F | HEIGHT: 70 IN | DIASTOLIC BLOOD PRESSURE: 81 MMHG | SYSTOLIC BLOOD PRESSURE: 117 MMHG

## 2019-03-26 DIAGNOSIS — K57.20 DIVERTICULITIS OF LARGE INTESTINE WITH PERFORATION, UNSPECIFIED BLEEDING STATUS: Primary | ICD-10-CM

## 2019-03-26 PROCEDURE — 96365 THER/PROPH/DIAG IV INF INIT: CPT

## 2019-03-26 PROCEDURE — 25010000002 ERTAPENEM PER 500 MG: Performed by: HOSPITALIST

## 2019-03-26 RX ADMIN — SODIUM CHLORIDE 1 G: 9 INJECTION, SOLUTION INTRAVENOUS at 07:35

## 2019-03-27 ENCOUNTER — HOSPITAL ENCOUNTER (OUTPATIENT)
Dept: ONCOLOGY | Facility: HOSPITAL | Age: 44
Setting detail: INFUSION SERIES
Discharge: HOME | End: 2019-03-27

## 2019-03-27 VITALS
HEART RATE: 80 BPM | RESPIRATION RATE: 18 BRPM | DIASTOLIC BLOOD PRESSURE: 72 MMHG | BODY MASS INDEX: 27.64 KG/M2 | TEMPERATURE: 97.6 F | HEIGHT: 70 IN | WEIGHT: 193.1 LBS | SYSTOLIC BLOOD PRESSURE: 113 MMHG

## 2019-03-27 DIAGNOSIS — K57.20 DIVERTICULITIS OF LARGE INTESTINE WITH PERFORATION, UNSPECIFIED BLEEDING STATUS: Primary | ICD-10-CM

## 2019-03-27 PROCEDURE — 25010000002 ERTAPENEM PER 500 MG: Performed by: HOSPITALIST

## 2019-03-27 PROCEDURE — 96365 THER/PROPH/DIAG IV INF INIT: CPT

## 2019-03-27 RX ADMIN — SODIUM CHLORIDE 1 G: 9 INJECTION, SOLUTION INTRAVENOUS at 07:39

## 2019-03-28 ENCOUNTER — HOSPITAL ENCOUNTER (OUTPATIENT)
Dept: ONCOLOGY | Facility: HOSPITAL | Age: 44
Setting detail: INFUSION SERIES
Discharge: HOME | End: 2019-03-28

## 2019-03-28 VITALS
BODY MASS INDEX: 27.79 KG/M2 | TEMPERATURE: 96.9 F | RESPIRATION RATE: 18 BRPM | DIASTOLIC BLOOD PRESSURE: 75 MMHG | WEIGHT: 194.1 LBS | HEIGHT: 70 IN | SYSTOLIC BLOOD PRESSURE: 112 MMHG | HEART RATE: 80 BPM

## 2019-03-28 DIAGNOSIS — K57.20 DIVERTICULITIS OF LARGE INTESTINE WITH PERFORATION, UNSPECIFIED BLEEDING STATUS: Primary | ICD-10-CM

## 2019-03-28 PROCEDURE — 96365 THER/PROPH/DIAG IV INF INIT: CPT

## 2019-03-28 PROCEDURE — 25010000002 ERTAPENEM PER 500 MG: Performed by: HOSPITALIST

## 2019-03-28 RX ADMIN — ERTAPENEM SODIUM 1 G: 1 INJECTION, POWDER, LYOPHILIZED, FOR SOLUTION INTRAMUSCULAR; INTRAVENOUS at 07:30

## 2019-03-29 ENCOUNTER — HOSPITAL ENCOUNTER (OUTPATIENT)
Dept: ONCOLOGY | Facility: HOSPITAL | Age: 44
Setting detail: INFUSION SERIES
Discharge: HOME | End: 2019-03-29

## 2019-03-29 VITALS
WEIGHT: 192 LBS | HEIGHT: 70 IN | SYSTOLIC BLOOD PRESSURE: 132 MMHG | TEMPERATURE: 97.8 F | BODY MASS INDEX: 27.49 KG/M2 | RESPIRATION RATE: 18 BRPM | DIASTOLIC BLOOD PRESSURE: 78 MMHG | HEART RATE: 100 BPM

## 2019-03-29 DIAGNOSIS — K57.20 DIVERTICULITIS OF LARGE INTESTINE WITH PERFORATION, UNSPECIFIED BLEEDING STATUS: Primary | ICD-10-CM

## 2019-03-29 PROCEDURE — 96365 THER/PROPH/DIAG IV INF INIT: CPT

## 2019-03-29 PROCEDURE — 25010000002 ERTAPENEM PER 500 MG: Performed by: HOSPITALIST

## 2019-03-29 RX ADMIN — ERTAPENEM SODIUM 1 G: 1 INJECTION, POWDER, LYOPHILIZED, FOR SOLUTION INTRAMUSCULAR; INTRAVENOUS at 07:40

## 2019-03-30 ENCOUNTER — HOSPITAL ENCOUNTER (OUTPATIENT)
Dept: ONCOLOGY | Facility: HOSPITAL | Age: 44
Setting detail: INFUSION SERIES
Discharge: HOME | End: 2019-03-30

## 2019-03-30 VITALS
TEMPERATURE: 97.8 F | RESPIRATION RATE: 16 BRPM | HEART RATE: 77 BPM | DIASTOLIC BLOOD PRESSURE: 76 MMHG | SYSTOLIC BLOOD PRESSURE: 130 MMHG

## 2019-03-30 DIAGNOSIS — K57.20 DIVERTICULITIS OF LARGE INTESTINE WITH PERFORATION, UNSPECIFIED BLEEDING STATUS: Primary | ICD-10-CM

## 2019-03-30 PROCEDURE — 96365 THER/PROPH/DIAG IV INF INIT: CPT

## 2019-03-30 PROCEDURE — 25010000002 ERTAPENEM PER 500 MG: Performed by: HOSPITALIST

## 2019-03-30 RX ADMIN — ERTAPENEM SODIUM 1 G: 1 INJECTION, POWDER, LYOPHILIZED, FOR SOLUTION INTRAMUSCULAR; INTRAVENOUS at 09:08

## 2019-03-31 ENCOUNTER — HOSPITAL ENCOUNTER (OUTPATIENT)
Dept: ONCOLOGY | Facility: HOSPITAL | Age: 44
Setting detail: INFUSION SERIES
Discharge: HOME | End: 2019-03-31

## 2019-03-31 VITALS
TEMPERATURE: 97.5 F | HEART RATE: 81 BPM | RESPIRATION RATE: 16 BRPM | SYSTOLIC BLOOD PRESSURE: 124 MMHG | DIASTOLIC BLOOD PRESSURE: 78 MMHG

## 2019-03-31 DIAGNOSIS — K57.20 DIVERTICULITIS OF LARGE INTESTINE WITH PERFORATION, UNSPECIFIED BLEEDING STATUS: Primary | ICD-10-CM

## 2019-03-31 PROCEDURE — 96365 THER/PROPH/DIAG IV INF INIT: CPT

## 2019-03-31 PROCEDURE — 25010000002 ERTAPENEM PER 500 MG: Performed by: HOSPITALIST

## 2019-03-31 RX ADMIN — ERTAPENEM SODIUM 1 G: 1 INJECTION, POWDER, LYOPHILIZED, FOR SOLUTION INTRAMUSCULAR; INTRAVENOUS at 09:24

## 2019-04-01 ENCOUNTER — HOSPITAL ENCOUNTER (OUTPATIENT)
Dept: ONCOLOGY | Facility: HOSPITAL | Age: 44
Setting detail: INFUSION SERIES
Discharge: HOME | End: 2019-04-01

## 2019-04-01 VITALS
DIASTOLIC BLOOD PRESSURE: 76 MMHG | RESPIRATION RATE: 18 BRPM | TEMPERATURE: 97.2 F | HEIGHT: 70 IN | SYSTOLIC BLOOD PRESSURE: 119 MMHG | HEART RATE: 87 BPM | WEIGHT: 194.6 LBS | BODY MASS INDEX: 27.86 KG/M2

## 2019-04-01 DIAGNOSIS — K57.20 DIVERTICULITIS OF LARGE INTESTINE WITH PERFORATION, UNSPECIFIED BLEEDING STATUS: Primary | ICD-10-CM

## 2019-04-01 LAB
ALBUMIN SERPL-MCNC: 4.48 G/DL (ref 3.2–4.8)
ALBUMIN/GLOB SERPL: 1.6 G/DL (ref 1.5–2.5)
ALP SERPL-CCNC: 88 U/L (ref 25–100)
ALT SERPL W P-5'-P-CCNC: 64 U/L (ref 7–40)
ANION GAP SERPL CALCULATED.3IONS-SCNC: 8 MMOL/L (ref 3–11)
AST SERPL-CCNC: 28 U/L (ref 0–33)
BILIRUB SERPL-MCNC: 0.3 MG/DL (ref 0.3–1.2)
BUN BLD-MCNC: 24 MG/DL (ref 9–23)
BUN/CREAT SERPL: 23.8 (ref 7–25)
CALCIUM SPEC-SCNC: 9.9 MG/DL (ref 8.7–10.4)
CHLORIDE SERPL-SCNC: 106 MMOL/L (ref 99–109)
CO2 SERPL-SCNC: 26 MMOL/L (ref 20–31)
CREAT BLD-MCNC: 1.01 MG/DL (ref 0.6–1.3)
CRP SERPL-MCNC: 0.66 MG/DL (ref 0–1)
ERYTHROCYTE [DISTWIDTH] IN BLOOD BY AUTOMATED COUNT: 13.4 % (ref 11.3–14.5)
ERYTHROCYTE [SEDIMENTATION RATE] IN BLOOD: 43 MM/HR (ref 0–15)
GFR SERPL CREATININE-BSD FRML MDRD: 80 ML/MIN/1.73
GLOBULIN UR ELPH-MCNC: 2.8 GM/DL
GLUCOSE BLD-MCNC: 138 MG/DL (ref 70–100)
HCT VFR BLD AUTO: 39.3 % (ref 38.9–50.9)
HGB BLD-MCNC: 13.7 G/DL (ref 13.1–17.5)
LYMPHOCYTES # BLD AUTO: 3.2 10*3/MM3 (ref 0.6–4.8)
LYMPHOCYTES NFR BLD AUTO: 40 % (ref 24–44)
MCH RBC QN AUTO: 33.4 PG (ref 27–31)
MCHC RBC AUTO-ENTMCNC: 34.8 G/DL (ref 32–36)
MCV RBC AUTO: 95.9 FL (ref 80–99)
MONOCYTES # BLD AUTO: 0.5 10*3/MM3 (ref 0–1)
MONOCYTES NFR BLD AUTO: 5.8 % (ref 0–12)
NEUTROPHILS # BLD AUTO: 4.4 10*3/MM3 (ref 1.5–8.3)
NEUTROPHILS NFR BLD AUTO: 54.2 % (ref 41–71)
PLATELET # BLD AUTO: 304 10*3/MM3 (ref 150–450)
PMV BLD AUTO: 8 FL (ref 6–12)
POTASSIUM BLD-SCNC: 4.4 MMOL/L (ref 3.5–5.5)
PROT SERPL-MCNC: 7.3 G/DL (ref 5.7–8.2)
RBC # BLD AUTO: 4.1 10*6/MM3 (ref 4.2–5.76)
SODIUM BLD-SCNC: 140 MMOL/L (ref 132–146)
WBC NRBC COR # BLD: 8.1 10*3/MM3 (ref 3.5–10.8)

## 2019-04-01 PROCEDURE — 85652 RBC SED RATE AUTOMATED: CPT | Performed by: INTERNAL MEDICINE

## 2019-04-01 PROCEDURE — 25010000002 ERTAPENEM PER 500 MG: Performed by: HOSPITALIST

## 2019-04-01 PROCEDURE — 86140 C-REACTIVE PROTEIN: CPT | Performed by: INTERNAL MEDICINE

## 2019-04-01 PROCEDURE — 80053 COMPREHEN METABOLIC PANEL: CPT | Performed by: INTERNAL MEDICINE

## 2019-04-01 PROCEDURE — 85025 COMPLETE CBC W/AUTO DIFF WBC: CPT | Performed by: INTERNAL MEDICINE

## 2019-04-01 PROCEDURE — 96365 THER/PROPH/DIAG IV INF INIT: CPT

## 2019-04-01 RX ORDER — SODIUM CHLORIDE 9 MG/ML
250 INJECTION, SOLUTION INTRAVENOUS ONCE
Status: CANCELLED | OUTPATIENT
Start: 2019-04-02

## 2019-04-01 RX ADMIN — ERTAPENEM SODIUM 1 G: 1 INJECTION, POWDER, LYOPHILIZED, FOR SOLUTION INTRAMUSCULAR; INTRAVENOUS at 07:37

## 2019-04-02 ENCOUNTER — HOSPITAL ENCOUNTER (OUTPATIENT)
Dept: ONCOLOGY | Facility: HOSPITAL | Age: 44
Setting detail: INFUSION SERIES
Discharge: HOME | End: 2019-04-02

## 2019-04-02 VITALS
WEIGHT: 193.9 LBS | DIASTOLIC BLOOD PRESSURE: 74 MMHG | SYSTOLIC BLOOD PRESSURE: 111 MMHG | RESPIRATION RATE: 18 BRPM | BODY MASS INDEX: 27.76 KG/M2 | TEMPERATURE: 97.4 F | HEART RATE: 110 BPM | HEIGHT: 70 IN

## 2019-04-02 DIAGNOSIS — K57.20 DIVERTICULITIS OF LARGE INTESTINE WITH PERFORATION, UNSPECIFIED BLEEDING STATUS: Primary | ICD-10-CM

## 2019-04-02 PROCEDURE — 96365 THER/PROPH/DIAG IV INF INIT: CPT

## 2019-04-02 PROCEDURE — 25010000002 ERTAPENEM PER 500 MG: Performed by: INTERNAL MEDICINE

## 2019-04-02 RX ORDER — SODIUM CHLORIDE 9 MG/ML
250 INJECTION, SOLUTION INTRAVENOUS ONCE
Status: CANCELLED | OUTPATIENT
Start: 2019-04-02

## 2019-04-02 RX ADMIN — ERTAPENEM SODIUM 1 G: 1 INJECTION, POWDER, LYOPHILIZED, FOR SOLUTION INTRAMUSCULAR; INTRAVENOUS at 07:41

## 2019-04-03 ENCOUNTER — HOSPITAL ENCOUNTER (OUTPATIENT)
Dept: ONCOLOGY | Facility: HOSPITAL | Age: 44
Setting detail: INFUSION SERIES
Discharge: HOME | End: 2019-04-03

## 2019-04-03 VITALS
BODY MASS INDEX: 27.5 KG/M2 | HEIGHT: 70 IN | RESPIRATION RATE: 18 BRPM | TEMPERATURE: 97.6 F | SYSTOLIC BLOOD PRESSURE: 122 MMHG | HEART RATE: 99 BPM | WEIGHT: 192.1 LBS | DIASTOLIC BLOOD PRESSURE: 87 MMHG

## 2019-04-03 DIAGNOSIS — K57.20 DIVERTICULITIS OF LARGE INTESTINE WITH PERFORATION, UNSPECIFIED BLEEDING STATUS: Primary | ICD-10-CM

## 2019-04-03 PROCEDURE — 25010000002 ERTAPENEM PER 500 MG: Performed by: INTERNAL MEDICINE

## 2019-04-03 PROCEDURE — 96365 THER/PROPH/DIAG IV INF INIT: CPT

## 2019-04-03 RX ORDER — SODIUM CHLORIDE 9 MG/ML
250 INJECTION, SOLUTION INTRAVENOUS ONCE
Status: CANCELLED | OUTPATIENT
Start: 2019-04-03

## 2019-04-03 RX ADMIN — ERTAPENEM SODIUM 1 G: 1 INJECTION, POWDER, LYOPHILIZED, FOR SOLUTION INTRAMUSCULAR; INTRAVENOUS at 07:39

## 2019-04-04 ENCOUNTER — HOSPITAL ENCOUNTER (OUTPATIENT)
Dept: ONCOLOGY | Facility: HOSPITAL | Age: 44
Setting detail: INFUSION SERIES
Discharge: HOME | End: 2019-04-04

## 2019-04-04 VITALS
WEIGHT: 193.8 LBS | SYSTOLIC BLOOD PRESSURE: 119 MMHG | HEIGHT: 70 IN | BODY MASS INDEX: 27.75 KG/M2 | HEART RATE: 81 BPM | TEMPERATURE: 97.5 F | DIASTOLIC BLOOD PRESSURE: 75 MMHG | RESPIRATION RATE: 18 BRPM

## 2019-04-04 DIAGNOSIS — K57.20 DIVERTICULITIS OF LARGE INTESTINE WITH PERFORATION, UNSPECIFIED BLEEDING STATUS: Primary | ICD-10-CM

## 2019-04-04 PROCEDURE — 96365 THER/PROPH/DIAG IV INF INIT: CPT

## 2019-04-04 PROCEDURE — 25010000002 ERTAPENEM PER 500 MG: Performed by: INTERNAL MEDICINE

## 2019-04-04 RX ADMIN — ERTAPENEM SODIUM 1 G: 1 INJECTION, POWDER, LYOPHILIZED, FOR SOLUTION INTRAMUSCULAR; INTRAVENOUS at 07:47

## 2019-04-05 ENCOUNTER — HOSPITAL ENCOUNTER (OUTPATIENT)
Dept: ONCOLOGY | Facility: HOSPITAL | Age: 44
Setting detail: INFUSION SERIES
Discharge: HOME | End: 2019-04-05

## 2019-04-05 VITALS
WEIGHT: 193 LBS | SYSTOLIC BLOOD PRESSURE: 117 MMHG | TEMPERATURE: 97.5 F | HEIGHT: 70 IN | HEART RATE: 94 BPM | BODY MASS INDEX: 27.63 KG/M2 | DIASTOLIC BLOOD PRESSURE: 75 MMHG | RESPIRATION RATE: 18 BRPM

## 2019-04-05 DIAGNOSIS — K57.20 DIVERTICULITIS OF LARGE INTESTINE WITH PERFORATION, UNSPECIFIED BLEEDING STATUS: Primary | ICD-10-CM

## 2019-04-05 PROCEDURE — 96365 THER/PROPH/DIAG IV INF INIT: CPT

## 2019-04-05 PROCEDURE — 25010000002 ERTAPENEM PER 500 MG: Performed by: INTERNAL MEDICINE

## 2019-04-05 RX ADMIN — ERTAPENEM SODIUM 1 G: 1 INJECTION, POWDER, LYOPHILIZED, FOR SOLUTION INTRAMUSCULAR; INTRAVENOUS at 07:54

## 2019-05-09 ENCOUNTER — APPOINTMENT (OUTPATIENT)
Dept: PREADMISSION TESTING | Facility: HOSPITAL | Age: 44
End: 2019-05-09

## 2019-05-09 VITALS — BODY MASS INDEX: 29.68 KG/M2 | HEIGHT: 69 IN | WEIGHT: 200.4 LBS

## 2019-05-09 LAB
ANION GAP SERPL CALCULATED.3IONS-SCNC: 11 MMOL/L
BUN BLD-MCNC: 27 MG/DL (ref 6–20)
BUN/CREAT SERPL: 26.2 (ref 7–25)
CALCIUM SPEC-SCNC: 9.5 MG/DL (ref 8.6–10.5)
CHLORIDE SERPL-SCNC: 104 MMOL/L (ref 98–107)
CO2 SERPL-SCNC: 23 MMOL/L (ref 22–29)
CREAT BLD-MCNC: 1.03 MG/DL (ref 0.76–1.27)
CRP SERPL-MCNC: 0.19 MG/DL (ref 0–0.5)
DEPRECATED RDW RBC AUTO: 44.1 FL (ref 37–54)
ERYTHROCYTE [DISTWIDTH] IN BLOOD BY AUTOMATED COUNT: 12.8 % (ref 12.3–15.4)
GFR SERPL CREATININE-BSD FRML MDRD: 78 ML/MIN/1.73
GLUCOSE BLD-MCNC: 322 MG/DL (ref 65–99)
HBA1C MFR BLD: 7.6 % (ref 4.8–5.6)
HCT VFR BLD AUTO: 43.4 % (ref 37.5–51)
HGB BLD-MCNC: 15.2 G/DL (ref 13–17.7)
MCH RBC QN AUTO: 33.1 PG (ref 26.6–33)
MCHC RBC AUTO-ENTMCNC: 35 G/DL (ref 31.5–35.7)
MCV RBC AUTO: 94.6 FL (ref 79–97)
PLATELET # BLD AUTO: 193 10*3/MM3 (ref 140–450)
PMV BLD AUTO: 10.8 FL (ref 6–12)
POTASSIUM BLD-SCNC: 5 MMOL/L (ref 3.5–5.2)
RBC # BLD AUTO: 4.59 10*6/MM3 (ref 4.14–5.8)
SODIUM BLD-SCNC: 138 MMOL/L (ref 136–145)
WBC NRBC COR # BLD: 7.67 10*3/MM3 (ref 3.4–10.8)

## 2019-05-09 PROCEDURE — 83036 HEMOGLOBIN GLYCOSYLATED A1C: CPT | Performed by: SURGERY

## 2019-05-09 PROCEDURE — 85027 COMPLETE CBC AUTOMATED: CPT | Performed by: SURGERY

## 2019-05-09 PROCEDURE — 86140 C-REACTIVE PROTEIN: CPT | Performed by: SURGERY

## 2019-05-09 PROCEDURE — 93010 ELECTROCARDIOGRAM REPORT: CPT | Performed by: INTERNAL MEDICINE

## 2019-05-09 PROCEDURE — 36415 COLL VENOUS BLD VENIPUNCTURE: CPT

## 2019-05-09 PROCEDURE — 93005 ELECTROCARDIOGRAM TRACING: CPT

## 2019-05-09 PROCEDURE — 80048 BASIC METABOLIC PNL TOTAL CA: CPT | Performed by: SURGERY

## 2019-05-09 RX ORDER — GABAPENTIN 400 MG/1
400 CAPSULE ORAL 3 TIMES DAILY
COMMUNITY
End: 2022-03-01 | Stop reason: ALTCHOICE

## 2019-05-09 NOTE — DISCHARGE INSTRUCTIONS
Patient instructed to drink 20 ounces (or until full) of Gatorade or 20 ounces of G2 (if diabetic) and complete 1 hour before arrival time for procedure (NO RED Gatorade or G2)    Patient verbalized understanding.    The following information and instructions were given:    Do not eat, drink, smoke or chew gum after midnight the night before surgery. This also includes no mints.  Take all routine, prescribed medications including heart and blood pressure medicines with a sip of water unless otherwise instructed by your physician.   Do NOT take diabetic medication unless instructed by your physician.    If you were instructed to drink 20 ounces (or until full) of Gatorade or G2 (if diabetic) the morning of surgery, the Gatorade or G2 must be completed 1 hour before arrival time on the day of surgery .  No RED Gatorade or G2.      DO NOT shave for two days before your procedure.  Do not wear makeup.      DO NOT wear fingernail polish (gel/regular) and/or acrylic/artificial nails on the day of surgery.   If you have had a recent manicure and would rather not remove polish or artificial nails, then the minimum requirement is that the polish/artificial nails must be removed from the middle finger on each hand.      If you are having surgery/procedure on an upper extremity, then fingernail polish/artificial fingernails must be removed for surgery.  NO EXCEPTIONS.      If you are having surgery/procedure on a lower extremity, then toenail polish on both extremities must be removed for surgery.  NO EXCEPTIONS.    Remove all jewelry (advise to go to jeweler if unable to remove).  Jewelry, especially rings, can no longer be taped for surgery.    Leave anything you consider valuable at home.    Leave your suitcase in the car until after your surgery.    Bring the following with you the day of your procedure (when applicable)       -picture ID and insurance cards       -Co-pay/deductible required by insurance       -Medications  in the original bottles (not a list) including all over-the-counter medications if not brought to [facility]       -Copy of advance directive, living will or power of  documents if not brought to [facility]       -CPAP or BIPAP mask and tubing (do not bring machine)       -Skin prep instruction(s) sheet       -[facility] Pass    Education booklet, brochure, handout or binder related to procedure given to patient.    When applicable, an ERAS booklet was given to patient.    Pain Control After Surgery handout given to patient.    Respirex use (handout given to patient) and pneumonia prevention education provided.    Signs and Symptoms of infection discussed.    DVT Prevention education given.  Stressing the importance of ambulation.    Please apply Chlorhexadine wipes to surgical area (if instructed) the night before procedure and the AM of procedure and document date/time of applications on skin prep instruction sheet.

## 2019-05-09 NOTE — PAT
Patient instructed to drink 20 ounces (or until full) of Gatorade or 20 ounces of G2 (if diabetic) and complete 1 hour before arrival time for procedure (NO RED Gatorade or G2)    Patient verbalized understanding.    The following information and instructions were given:    Do not eat, drink, smoke or chew gum after midnight the night before surgery. This also includes no mints.  Take all routine, prescribed medications including heart and blood pressure medicines with a sip of water unless otherwise instructed by your physician.   Do NOT take diabetic medication unless instructed by your physician.    If you were instructed to drink 20 ounces (or until full) of Gatorade or G2 (if diabetic) the morning of surgery, the Gatorade or G2 must be completed 1 hour before arrival time on the day of surgery .  No RED Gatorade or G2.      DO NOT shave for two days before your procedure.  Do not wear makeup.      DO NOT wear fingernail polish (gel/regular) and/or acrylic/artificial nails on the day of surgery.   If you have had a recent manicure and would rather not remove polish or artificial nails, then the minimum requirement is that the polish/artificial nails must be removed from the middle finger on each hand.      If you are having surgery/procedure on an upper extremity, then fingernail polish/artificial fingernails must be removed for surgery.  NO EXCEPTIONS.      If you are having surgery/procedure on a lower extremity, then toenail polish on both extremities must be removed for surgery.  NO EXCEPTIONS.    Remove all jewelry (advise to go to jeweler if unable to remove).  Jewelry, especially rings, can no longer be taped for surgery.    Leave anything you consider valuable at home.    Leave your suitcase in the car until after your surgery.    Bring the following with you the day of your procedure (when applicable)       -picture ID and insurance cards       -Co-pay/deductible required by insurance       -Medications  in the original bottles (not a list) including all over-the-counter medications if not brought to [facility]       -Copy of advance directive, living will or power of  documents if not brought to [facility]       -CPAP or BIPAP mask and tubing (do not bring machine)       -Skin prep instruction(s) sheet       -[facility] Pass    Education booklet, brochure, handout or binder related to procedure given to patient.    When applicable, an ERAS booklet was given to patient.    Pain Control After Surgery handout given to patient.    Respirex use (handout given to patient) and pneumonia prevention education provided.    Signs and Symptoms of infection discussed.    DVT Prevention education given.  Stressing the importance of ambulation.    Please apply Chlorhexadine wipes to surgical area (if instructed) the night before procedure and the AM of procedure and document date/time of applications on skin prep instruction sheet.

## 2019-05-12 ENCOUNTER — ANESTHESIA EVENT (OUTPATIENT)
Dept: PERIOP | Facility: HOSPITAL | Age: 44
End: 2019-05-12

## 2019-05-13 ENCOUNTER — ANESTHESIA (OUTPATIENT)
Dept: PERIOP | Facility: HOSPITAL | Age: 44
End: 2019-05-13

## 2019-05-13 ENCOUNTER — HOSPITAL ENCOUNTER (INPATIENT)
Facility: HOSPITAL | Age: 44
LOS: 4 days | Discharge: HOME | End: 2019-05-17
Attending: SURGERY | Admitting: SURGERY

## 2019-05-13 DIAGNOSIS — K57.32 SIGMOID DIVERTICULITIS: ICD-10-CM

## 2019-05-13 PROBLEM — K57.90 DIVERTICULOSIS: Status: ACTIVE | Noted: 2019-05-13

## 2019-05-13 LAB
GLUCOSE BLDC GLUCOMTR-MCNC: 144 MG/DL (ref 70–130)
GLUCOSE BLDC GLUCOMTR-MCNC: 190 MG/DL (ref 70–130)
GLUCOSE BLDC GLUCOMTR-MCNC: 193 MG/DL (ref 70–130)
GLUCOSE BLDC GLUCOMTR-MCNC: 204 MG/DL (ref 70–130)
POTASSIUM BLD-SCNC: 4.3 MMOL/L (ref 3.5–5.2)

## 2019-05-13 PROCEDURE — 25010000002 HEPARIN (PORCINE) PER 1000 UNITS: Performed by: SURGERY

## 2019-05-13 PROCEDURE — 63710000001 INSULIN LISPRO (HUMAN) PER 5 UNITS

## 2019-05-13 PROCEDURE — 25010000002 CEFOXITIN PER 1 G: Performed by: SURGERY

## 2019-05-13 PROCEDURE — 25010000002 DEXAMETHASONE SODIUM PHOSPHATE 10 MG/ML SOLUTION 1 ML VIAL: Performed by: NURSE ANESTHETIST, CERTIFIED REGISTERED

## 2019-05-13 PROCEDURE — 25010000002 HYDROMORPHONE PER 4 MG: Performed by: NURSE ANESTHETIST, CERTIFIED REGISTERED

## 2019-05-13 PROCEDURE — 25010000002 MORPHINE PER 10 MG: Performed by: SURGERY

## 2019-05-13 PROCEDURE — 25010000002 BUPRENORPHINE PER 0.1 MG: Performed by: NURSE ANESTHETIST, CERTIFIED REGISTERED

## 2019-05-13 PROCEDURE — 25010000002 PROPOFOL 10 MG/ML EMULSION: Performed by: NURSE ANESTHETIST, CERTIFIED REGISTERED

## 2019-05-13 PROCEDURE — 0DN84ZZ RELEASE SMALL INTESTINE, PERCUTANEOUS ENDOSCOPIC APPROACH: ICD-10-PCS | Performed by: SURGERY

## 2019-05-13 PROCEDURE — 25010000002 FENTANYL CITRATE (PF) 100 MCG/2ML SOLUTION: Performed by: NURSE ANESTHETIST, CERTIFIED REGISTERED

## 2019-05-13 PROCEDURE — 82962 GLUCOSE BLOOD TEST: CPT

## 2019-05-13 PROCEDURE — 25010000002 ONDANSETRON PER 1 MG: Performed by: NURSE ANESTHETIST, CERTIFIED REGISTERED

## 2019-05-13 PROCEDURE — 25010000002 METHYLNALTREXONE 12 MG/0.6ML SOLUTION: Performed by: SURGERY

## 2019-05-13 PROCEDURE — 0DTN4ZZ RESECTION OF SIGMOID COLON, PERCUTANEOUS ENDOSCOPIC APPROACH: ICD-10-PCS | Performed by: SURGERY

## 2019-05-13 PROCEDURE — 63710000001 INSULIN REGULAR HUMAN PER 5 UNITS: Performed by: SURGERY

## 2019-05-13 PROCEDURE — 25010000002 DEXAMETHASONE PER 1 MG: Performed by: NURSE ANESTHETIST, CERTIFIED REGISTERED

## 2019-05-13 PROCEDURE — 84132 ASSAY OF SERUM POTASSIUM: CPT | Performed by: ANESTHESIOLOGY

## 2019-05-13 PROCEDURE — 88307 TISSUE EXAM BY PATHOLOGIST: CPT | Performed by: SURGERY

## 2019-05-13 DEVICE — ENDOPATH ECHELON ENDOSCOPIC LINEAR CUTTER RELOADS, WHITE, 60MM
Type: IMPLANTABLE DEVICE | Site: SIGMOID COLON | Status: FUNCTIONAL
Brand: ECHELON ENDOPATH

## 2019-05-13 DEVICE — ENDOPATH ECHELON ENDOSCOPIC LINEAR CUTTER RELOADS, BLUE, 60MM
Type: IMPLANTABLE DEVICE | Site: SIGMOID COLON | Status: FUNCTIONAL
Brand: ECHELON ENDOPATH

## 2019-05-13 RX ORDER — SODIUM CHLORIDE, SODIUM LACTATE, POTASSIUM CHLORIDE, CALCIUM CHLORIDE 600; 310; 30; 20 MG/100ML; MG/100ML; MG/100ML; MG/100ML
9 INJECTION, SOLUTION INTRAVENOUS CONTINUOUS PRN
Status: DISCONTINUED | OUTPATIENT
Start: 2019-05-13 | End: 2019-05-13 | Stop reason: HOSPADM

## 2019-05-13 RX ORDER — LIDOCAINE HYDROCHLORIDE 10 MG/ML
0.5 INJECTION, SOLUTION EPIDURAL; INFILTRATION; INTRACAUDAL; PERINEURAL ONCE AS NEEDED
Status: COMPLETED | OUTPATIENT
Start: 2019-05-13 | End: 2019-05-13

## 2019-05-13 RX ORDER — GLYCOPYRROLATE 0.2 MG/ML
INJECTION INTRAMUSCULAR; INTRAVENOUS AS NEEDED
Status: DISCONTINUED | OUTPATIENT
Start: 2019-05-13 | End: 2019-05-13 | Stop reason: SURG

## 2019-05-13 RX ORDER — PROMETHAZINE HYDROCHLORIDE 25 MG/1
25 TABLET ORAL ONCE AS NEEDED
Status: DISCONTINUED | OUTPATIENT
Start: 2019-05-13 | End: 2019-05-13 | Stop reason: HOSPADM

## 2019-05-13 RX ORDER — PROPRANOLOL HYDROCHLORIDE 10 MG/1
10 TABLET ORAL DAILY
Status: DISCONTINUED | OUTPATIENT
Start: 2019-05-13 | End: 2019-05-13

## 2019-05-13 RX ORDER — MIDAZOLAM HYDROCHLORIDE 1 MG/ML
1 INJECTION INTRAMUSCULAR; INTRAVENOUS
Status: DISCONTINUED | OUTPATIENT
Start: 2019-05-13 | End: 2019-05-15

## 2019-05-13 RX ORDER — PROMETHAZINE HYDROCHLORIDE 25 MG/ML
6.25 INJECTION, SOLUTION INTRAMUSCULAR; INTRAVENOUS ONCE AS NEEDED
Status: DISCONTINUED | OUTPATIENT
Start: 2019-05-13 | End: 2019-05-13 | Stop reason: HOSPADM

## 2019-05-13 RX ORDER — ONDANSETRON 2 MG/ML
INJECTION INTRAMUSCULAR; INTRAVENOUS AS NEEDED
Status: DISCONTINUED | OUTPATIENT
Start: 2019-05-13 | End: 2019-05-13 | Stop reason: SURG

## 2019-05-13 RX ORDER — SODIUM CHLORIDE 9 MG/ML
INJECTION, SOLUTION INTRAVENOUS AS NEEDED
Status: DISCONTINUED | OUTPATIENT
Start: 2019-05-13 | End: 2019-05-13 | Stop reason: HOSPADM

## 2019-05-13 RX ORDER — FAMOTIDINE 20 MG/1
20 TABLET, FILM COATED ORAL 2 TIMES DAILY
Status: DISCONTINUED | OUTPATIENT
Start: 2019-05-13 | End: 2019-05-17 | Stop reason: HOSPADM

## 2019-05-13 RX ORDER — CLONAZEPAM 0.5 MG/1
0.5 TABLET ORAL EVERY 12 HOURS SCHEDULED
Status: DISCONTINUED | OUTPATIENT
Start: 2019-05-13 | End: 2019-05-17 | Stop reason: HOSPADM

## 2019-05-13 RX ORDER — HYDROMORPHONE HYDROCHLORIDE 1 MG/ML
0.5 INJECTION, SOLUTION INTRAMUSCULAR; INTRAVENOUS; SUBCUTANEOUS
Status: DISCONTINUED | OUTPATIENT
Start: 2019-05-13 | End: 2019-05-13 | Stop reason: HOSPADM

## 2019-05-13 RX ORDER — ATRACURIUM BESYLATE 10 MG/ML
INJECTION, SOLUTION INTRAVENOUS AS NEEDED
Status: DISCONTINUED | OUTPATIENT
Start: 2019-05-13 | End: 2019-05-13 | Stop reason: SURG

## 2019-05-13 RX ORDER — FAMOTIDINE 20 MG/1
20 TABLET, FILM COATED ORAL
Status: DISCONTINUED | OUTPATIENT
Start: 2019-05-13 | End: 2019-05-13 | Stop reason: HOSPADM

## 2019-05-13 RX ORDER — ACETAMINOPHEN 325 MG/1
650 TABLET ORAL EVERY 4 HOURS PRN
Status: DISCONTINUED | OUTPATIENT
Start: 2019-05-13 | End: 2019-05-17 | Stop reason: HOSPADM

## 2019-05-13 RX ORDER — PREGABALIN 75 MG/1
75 CAPSULE ORAL ONCE
Status: COMPLETED | OUTPATIENT
Start: 2019-05-13 | End: 2019-05-13

## 2019-05-13 RX ORDER — FENTANYL CITRATE 50 UG/ML
INJECTION, SOLUTION INTRAMUSCULAR; INTRAVENOUS AS NEEDED
Status: DISCONTINUED | OUTPATIENT
Start: 2019-05-13 | End: 2019-05-13 | Stop reason: SURG

## 2019-05-13 RX ORDER — HEPARIN SODIUM 5000 [USP'U]/ML
5000 INJECTION, SOLUTION INTRAVENOUS; SUBCUTANEOUS EVERY 8 HOURS SCHEDULED
Status: DISCONTINUED | OUTPATIENT
Start: 2019-05-14 | End: 2019-05-17 | Stop reason: HOSPADM

## 2019-05-13 RX ORDER — SODIUM CHLORIDE, SODIUM LACTATE, POTASSIUM CHLORIDE, CALCIUM CHLORIDE 600; 310; 30; 20 MG/100ML; MG/100ML; MG/100ML; MG/100ML
50 INJECTION, SOLUTION INTRAVENOUS CONTINUOUS
Status: DISCONTINUED | OUTPATIENT
Start: 2019-05-13 | End: 2019-05-16

## 2019-05-13 RX ORDER — PROMETHAZINE HYDROCHLORIDE 25 MG/ML
12.5 INJECTION, SOLUTION INTRAMUSCULAR; INTRAVENOUS EVERY 6 HOURS PRN
Status: DISCONTINUED | OUTPATIENT
Start: 2019-05-13 | End: 2019-05-15

## 2019-05-13 RX ORDER — ONDANSETRON 2 MG/ML
4 INJECTION INTRAMUSCULAR; INTRAVENOUS EVERY 6 HOURS PRN
Status: DISCONTINUED | OUTPATIENT
Start: 2019-05-13 | End: 2019-05-15

## 2019-05-13 RX ORDER — DIVALPROEX SODIUM 500 MG/1
500 TABLET, DELAYED RELEASE ORAL EVERY 12 HOURS SCHEDULED
Status: DISCONTINUED | OUTPATIENT
Start: 2019-05-13 | End: 2019-05-17 | Stop reason: HOSPADM

## 2019-05-13 RX ORDER — PROMETHAZINE HYDROCHLORIDE 25 MG/1
25 SUPPOSITORY RECTAL ONCE AS NEEDED
Status: DISCONTINUED | OUTPATIENT
Start: 2019-05-13 | End: 2019-05-13 | Stop reason: HOSPADM

## 2019-05-13 RX ORDER — ONDANSETRON 4 MG/1
4 TABLET, FILM COATED ORAL EVERY 6 HOURS PRN
Status: DISCONTINUED | OUTPATIENT
Start: 2019-05-13 | End: 2019-05-17 | Stop reason: HOSPADM

## 2019-05-13 RX ORDER — PROPRANOLOL HYDROCHLORIDE 10 MG/1
10 TABLET ORAL DAILY
Status: DISCONTINUED | OUTPATIENT
Start: 2019-05-14 | End: 2019-05-17 | Stop reason: HOSPADM

## 2019-05-13 RX ORDER — ONDANSETRON 2 MG/ML
4 INJECTION INTRAMUSCULAR; INTRAVENOUS ONCE AS NEEDED
Status: DISCONTINUED | OUTPATIENT
Start: 2019-05-13 | End: 2019-05-13 | Stop reason: HOSPADM

## 2019-05-13 RX ORDER — DEXTROSE MONOHYDRATE 25 G/50ML
25 INJECTION, SOLUTION INTRAVENOUS
Status: DISCONTINUED | OUTPATIENT
Start: 2019-05-13 | End: 2019-05-17 | Stop reason: HOSPADM

## 2019-05-13 RX ORDER — HYDROCODONE BITARTRATE AND ACETAMINOPHEN 5; 325 MG/1; MG/1
1 TABLET ORAL EVERY 4 HOURS PRN
Status: DISCONTINUED | OUTPATIENT
Start: 2019-05-13 | End: 2019-05-15

## 2019-05-13 RX ORDER — MORPHINE SULFATE 4 MG/ML
4 INJECTION, SOLUTION INTRAMUSCULAR; INTRAVENOUS
Status: DISCONTINUED | OUTPATIENT
Start: 2019-05-13 | End: 2019-05-17 | Stop reason: HOSPADM

## 2019-05-13 RX ORDER — ACETAMINOPHEN 160 MG/5ML
650 SOLUTION ORAL EVERY 4 HOURS PRN
Status: DISCONTINUED | OUTPATIENT
Start: 2019-05-13 | End: 2019-05-17 | Stop reason: HOSPADM

## 2019-05-13 RX ORDER — NICOTINE POLACRILEX 4 MG
15 LOZENGE BUCCAL
Status: DISCONTINUED | OUTPATIENT
Start: 2019-05-13 | End: 2019-05-17 | Stop reason: HOSPADM

## 2019-05-13 RX ORDER — DEXAMETHASONE SODIUM PHOSPHATE 4 MG/ML
INJECTION, SOLUTION INTRA-ARTICULAR; INTRALESIONAL; INTRAMUSCULAR; INTRAVENOUS; SOFT TISSUE AS NEEDED
Status: DISCONTINUED | OUTPATIENT
Start: 2019-05-13 | End: 2019-05-13 | Stop reason: SURG

## 2019-05-13 RX ORDER — LIDOCAINE HYDROCHLORIDE 10 MG/ML
INJECTION, SOLUTION INFILTRATION; PERINEURAL AS NEEDED
Status: DISCONTINUED | OUTPATIENT
Start: 2019-05-13 | End: 2019-05-13 | Stop reason: SURG

## 2019-05-13 RX ORDER — DULOXETIN HYDROCHLORIDE 60 MG/1
60 CAPSULE, DELAYED RELEASE ORAL 2 TIMES DAILY
Status: DISCONTINUED | OUTPATIENT
Start: 2019-05-13 | End: 2019-05-17 | Stop reason: HOSPADM

## 2019-05-13 RX ORDER — NEOSTIGMINE METHYLSULFATE 5 MG/5 ML
SYRINGE (ML) INTRAVENOUS AS NEEDED
Status: DISCONTINUED | OUTPATIENT
Start: 2019-05-13 | End: 2019-05-13 | Stop reason: SURG

## 2019-05-13 RX ORDER — NALOXONE HCL 0.4 MG/ML
0.4 VIAL (ML) INJECTION
Status: DISCONTINUED | OUTPATIENT
Start: 2019-05-13 | End: 2019-05-17 | Stop reason: HOSPADM

## 2019-05-13 RX ORDER — FENTANYL CITRATE 50 UG/ML
50 INJECTION, SOLUTION INTRAMUSCULAR; INTRAVENOUS
Status: DISCONTINUED | OUTPATIENT
Start: 2019-05-13 | End: 2019-05-13 | Stop reason: HOSPADM

## 2019-05-13 RX ORDER — HEPARIN SODIUM 5000 [USP'U]/ML
5000 INJECTION, SOLUTION INTRAVENOUS; SUBCUTANEOUS ONCE
Status: DISCONTINUED | OUTPATIENT
Start: 2019-05-13 | End: 2019-05-13 | Stop reason: HOSPADM

## 2019-05-13 RX ORDER — HEPARIN SODIUM 5000 [USP'U]/ML
INJECTION, SOLUTION INTRAVENOUS; SUBCUTANEOUS AS NEEDED
Status: DISCONTINUED | OUTPATIENT
Start: 2019-05-13 | End: 2019-05-13 | Stop reason: HOSPADM

## 2019-05-13 RX ORDER — SCOLOPAMINE TRANSDERMAL SYSTEM 1 MG/1
1 PATCH, EXTENDED RELEASE TRANSDERMAL CONTINUOUS
Status: DISCONTINUED | OUTPATIENT
Start: 2019-05-13 | End: 2019-05-16

## 2019-05-13 RX ORDER — ACETAMINOPHEN 500 MG
1000 TABLET ORAL ONCE
Status: COMPLETED | OUTPATIENT
Start: 2019-05-13 | End: 2019-05-13

## 2019-05-13 RX ORDER — SODIUM CHLORIDE 0.9 % (FLUSH) 0.9 %
1-10 SYRINGE (ML) INJECTION AS NEEDED
Status: DISCONTINUED | OUTPATIENT
Start: 2019-05-13 | End: 2019-05-13 | Stop reason: HOSPADM

## 2019-05-13 RX ORDER — SODIUM CHLORIDE 0.9 % (FLUSH) 0.9 %
3 SYRINGE (ML) INJECTION EVERY 12 HOURS SCHEDULED
Status: DISCONTINUED | OUTPATIENT
Start: 2019-05-13 | End: 2019-05-13 | Stop reason: HOSPADM

## 2019-05-13 RX ORDER — DEXTROSE MONOHYDRATE 25 G/50ML
25 INJECTION, SOLUTION INTRAVENOUS
Status: DISCONTINUED | OUTPATIENT
Start: 2019-05-13 | End: 2019-05-14 | Stop reason: SDUPTHER

## 2019-05-13 RX ORDER — PROPOFOL 10 MG/ML
VIAL (ML) INTRAVENOUS AS NEEDED
Status: DISCONTINUED | OUTPATIENT
Start: 2019-05-13 | End: 2019-05-13 | Stop reason: SURG

## 2019-05-13 RX ORDER — NICOTINE POLACRILEX 4 MG
15 LOZENGE BUCCAL
Status: DISCONTINUED | OUTPATIENT
Start: 2019-05-13 | End: 2019-05-14 | Stop reason: SDUPTHER

## 2019-05-13 RX ORDER — GABAPENTIN 400 MG/1
400 CAPSULE ORAL 3 TIMES DAILY
Status: DISCONTINUED | OUTPATIENT
Start: 2019-05-13 | End: 2019-05-17 | Stop reason: HOSPADM

## 2019-05-13 RX ORDER — MAGNESIUM HYDROXIDE 1200 MG/15ML
LIQUID ORAL AS NEEDED
Status: DISCONTINUED | OUTPATIENT
Start: 2019-05-13 | End: 2019-05-13 | Stop reason: HOSPADM

## 2019-05-13 RX ORDER — ALVIMOPAN 12 MG/1
12 CAPSULE ORAL ONCE
Status: COMPLETED | OUTPATIENT
Start: 2019-05-13 | End: 2019-05-13

## 2019-05-13 RX ADMIN — EPHEDRINE SULFATE 10 MG: 50 INJECTION INTRAMUSCULAR; INTRAVENOUS; SUBCUTANEOUS at 12:03

## 2019-05-13 RX ADMIN — TRAZODONE HYDROCHLORIDE 150 MG: 50 TABLET ORAL at 21:19

## 2019-05-13 RX ADMIN — DEXAMETHASONE SODIUM PHOSPHATE 6 MG: 4 INJECTION, SOLUTION INTRAMUSCULAR; INTRAVENOUS at 11:50

## 2019-05-13 RX ADMIN — GABAPENTIN 400 MG: 400 CAPSULE ORAL at 17:42

## 2019-05-13 RX ADMIN — DEXAMETHASONE SODIUM PHOSPHATE 60 ML: 10 INJECTION, SOLUTION INTRAMUSCULAR; INTRAVENOUS at 11:21

## 2019-05-13 RX ADMIN — FENTANYL CITRATE 50 MCG: 50 INJECTION, SOLUTION INTRAMUSCULAR; INTRAVENOUS at 11:15

## 2019-05-13 RX ADMIN — FAMOTIDINE 20 MG: 20 TABLET ORAL at 09:28

## 2019-05-13 RX ADMIN — DULOXETINE HYDROCHLORIDE 60 MG: 60 CAPSULE, DELAYED RELEASE ORAL at 21:20

## 2019-05-13 RX ADMIN — PREGABALIN 75 MG: 75 CAPSULE ORAL at 09:28

## 2019-05-13 RX ADMIN — CLONAZEPAM 0.5 MG: 0.5 TABLET ORAL at 21:23

## 2019-05-13 RX ADMIN — DIVALPROEX SODIUM 500 MG: 500 TABLET, DELAYED RELEASE ORAL at 21:20

## 2019-05-13 RX ADMIN — MORPHINE SULFATE 4 MG: 4 INJECTION INTRAVENOUS at 17:42

## 2019-05-13 RX ADMIN — ATRACURIUM BESYLATE 30 MG: 10 INJECTION, SOLUTION INTRAVENOUS at 11:17

## 2019-05-13 RX ADMIN — HYDROMORPHONE HYDROCHLORIDE 0.5 MG: 1 INJECTION, SOLUTION INTRAMUSCULAR; INTRAVENOUS; SUBCUTANEOUS at 15:37

## 2019-05-13 RX ADMIN — SODIUM CHLORIDE, POTASSIUM CHLORIDE, SODIUM LACTATE AND CALCIUM CHLORIDE 125 ML/HR: 600; 310; 30; 20 INJECTION, SOLUTION INTRAVENOUS at 21:22

## 2019-05-13 RX ADMIN — PROPOFOL 200 MG: 10 INJECTION, EMULSION INTRAVENOUS at 11:16

## 2019-05-13 RX ADMIN — SODIUM CHLORIDE, POTASSIUM CHLORIDE, SODIUM LACTATE AND CALCIUM CHLORIDE 125 ML/HR: 600; 310; 30; 20 INJECTION, SOLUTION INTRAVENOUS at 16:47

## 2019-05-13 RX ADMIN — GLYCOPYRROLATE 0.4 MG: 0.2 INJECTION, SOLUTION INTRAMUSCULAR; INTRAVENOUS at 14:54

## 2019-05-13 RX ADMIN — SODIUM CHLORIDE, POTASSIUM CHLORIDE, SODIUM LACTATE AND CALCIUM CHLORIDE: 600; 310; 30; 20 INJECTION, SOLUTION INTRAVENOUS at 12:44

## 2019-05-13 RX ADMIN — GABAPENTIN 400 MG: 400 CAPSULE ORAL at 21:19

## 2019-05-13 RX ADMIN — Medication 3 MG: at 14:54

## 2019-05-13 RX ADMIN — GLYCOPYRROLATE 0.2 MG: 0.2 INJECTION, SOLUTION INTRAMUSCULAR; INTRAVENOUS at 11:53

## 2019-05-13 RX ADMIN — LIDOCAINE HYDROCHLORIDE 0.2 ML: 10 INJECTION, SOLUTION EPIDURAL; INFILTRATION; INTRACAUDAL; PERINEURAL at 09:24

## 2019-05-13 RX ADMIN — FENTANYL CITRATE 50 MCG: 50 INJECTION INTRAMUSCULAR; INTRAVENOUS at 16:04

## 2019-05-13 RX ADMIN — ATRACURIUM BESYLATE 10 MG: 10 INJECTION, SOLUTION INTRAVENOUS at 12:00

## 2019-05-13 RX ADMIN — CEFOXITIN SODIUM 2 G: 1 POWDER, FOR SOLUTION INTRAVENOUS at 11:20

## 2019-05-13 RX ADMIN — SODIUM CHLORIDE, POTASSIUM CHLORIDE, SODIUM LACTATE AND CALCIUM CHLORIDE 9 ML/HR: 600; 310; 30; 20 INJECTION, SOLUTION INTRAVENOUS at 09:24

## 2019-05-13 RX ADMIN — DEXAMETHASONE SODIUM PHOSPHATE: 10 INJECTION, SOLUTION INTRAMUSCULAR; INTRAVENOUS at 11:50

## 2019-05-13 RX ADMIN — SODIUM CHLORIDE, POTASSIUM CHLORIDE, SODIUM LACTATE AND CALCIUM CHLORIDE: 600; 310; 30; 20 INJECTION, SOLUTION INTRAVENOUS at 11:13

## 2019-05-13 RX ADMIN — ATRACURIUM BESYLATE 10 MG: 10 INJECTION, SOLUTION INTRAVENOUS at 14:26

## 2019-05-13 RX ADMIN — FENTANYL CITRATE 50 MCG: 50 INJECTION, SOLUTION INTRAMUSCULAR; INTRAVENOUS at 14:55

## 2019-05-13 RX ADMIN — INSULIN HUMAN 3 UNITS: 100 INJECTION, SOLUTION PARENTERAL at 23:50

## 2019-05-13 RX ADMIN — SCOPALAMINE 1 PATCH: 1 PATCH, EXTENDED RELEASE TRANSDERMAL at 09:27

## 2019-05-13 RX ADMIN — CEFOXITIN SODIUM 2 G: 1 POWDER, FOR SOLUTION INTRAVENOUS at 17:52

## 2019-05-13 RX ADMIN — ATRACURIUM BESYLATE 10 MG: 10 INJECTION, SOLUTION INTRAVENOUS at 12:45

## 2019-05-13 RX ADMIN — FAMOTIDINE 20 MG: 20 TABLET ORAL at 21:20

## 2019-05-13 RX ADMIN — HYDROMORPHONE HYDROCHLORIDE 0.5 MG: 1 INJECTION, SOLUTION INTRAMUSCULAR; INTRAVENOUS; SUBCUTANEOUS at 15:43

## 2019-05-13 RX ADMIN — ATRACURIUM BESYLATE 5 MG: 10 INJECTION, SOLUTION INTRAVENOUS at 13:40

## 2019-05-13 RX ADMIN — ONDANSETRON 4 MG: 2 INJECTION INTRAMUSCULAR; INTRAVENOUS at 14:49

## 2019-05-13 RX ADMIN — HYDROMORPHONE HYDROCHLORIDE 0.5 MG: 1 INJECTION, SOLUTION INTRAMUSCULAR; INTRAVENOUS; SUBCUTANEOUS at 16:10

## 2019-05-13 RX ADMIN — METHYLNALTREXONE BROMIDE 6 MG: 12 INJECTION, SOLUTION SUBCUTANEOUS at 17:42

## 2019-05-13 RX ADMIN — LIDOCAINE HYDROCHLORIDE 50 MG: 10 INJECTION, SOLUTION INFILTRATION; PERINEURAL at 11:16

## 2019-05-13 RX ADMIN — CEFOXITIN SODIUM 2 G: 1 POWDER, FOR SOLUTION INTRAVENOUS at 23:50

## 2019-05-13 RX ADMIN — ACETAMINOPHEN 1000 MG: 500 TABLET ORAL at 09:28

## 2019-05-13 RX ADMIN — SODIUM CHLORIDE, POTASSIUM CHLORIDE, SODIUM LACTATE AND CALCIUM CHLORIDE: 600; 310; 30; 20 INJECTION, SOLUTION INTRAVENOUS at 13:53

## 2019-05-13 RX ADMIN — FENTANYL CITRATE 50 MCG: 50 INJECTION INTRAMUSCULAR; INTRAVENOUS at 15:55

## 2019-05-13 RX ADMIN — ALVIMOPAN 12 MG: 12 CAPSULE ORAL at 09:36

## 2019-05-13 NOTE — INTERVAL H&P NOTE
"[facility] Pre-op    Full history and physical note from office is up to date.  See office note attached.    /87 (BP Location: Right arm, Patient Position: Lying)   Pulse 75   Temp 97.8 °F (36.6 °C) (Temporal)   Resp 18   Ht 175.3 cm (69\")   Wt 90.9 kg (200 lb 6.4 oz)   SpO2 97%   BMI 29.59 kg/m²     LAB Results:  Lab Results   Component Value Date    WBC 7.67 05/09/2019    HGB 15.2 05/09/2019    HCT 43.4 05/09/2019    MCV 94.6 05/09/2019     05/09/2019    NEUTROABS 4.40 04/01/2019    GLUCOSE 322 (H) 05/09/2019    BUN 27 (H) 05/09/2019    CREATININE 1.03 05/09/2019    EGFRIFNONA 78 05/09/2019     05/09/2019    K 5.0 05/09/2019     05/09/2019    CO2 23.0 05/09/2019    MG 1.6 03/20/2019    CALCIUM 9.5 05/09/2019    ALBUMIN 4.48 04/01/2019    AST 28 04/01/2019    ALT 64 (H) 04/01/2019    BILITOT 0.3 04/01/2019       Cancer Staging (if applicable)  Cancer Patient: __ yes _x_no __unknown__N/A; If yes, clinical stage T:__ N:__M:__, stage group or __N/A    Riana Lopez, APRN 5/13/2019 9:15 AM       I have reviewed the above note, my prior note(s), appropriate imaging, and labs.  I have again discussed the risks and benefits of laparoscopic sigmoid colectomy possible open with the patient.  All of their questions have been answered.  They understand and wish to proceed with surgical intervention.    CBC  Results from last 7 days   Lab Units 05/09/19  0846   WBC 10*3/mm3 7.67   HEMOGLOBIN g/dL 15.2   HEMATOCRIT % 43.4   PLATELETS 10*3/mm3 193       CMP  Results from last 7 days   Lab Units 05/13/19  0939 05/09/19  0846   SODIUM mmol/L  --  138   POTASSIUM mmol/L 4.3 5.0   CHLORIDE mmol/L  --  104   CO2 mmol/L  --  23.0   BUN mg/dL  --  27*   CREATININE mg/dL  --  1.03   CALCIUM mg/dL  --  9.5   GLUCOSE mg/dL  --  322*                 "

## 2019-05-13 NOTE — ANESTHESIA PROCEDURE NOTES
Peripheral Block      Patient location during procedure: OR  Reason for block: at surgeon's request and post-op pain management  Performed by  CRNA: Ryan Smith CRNA  Preanesthetic Checklist  Completed: patient identified, site marked, surgical consent, pre-op evaluation, timeout performed, IV checked, risks and benefits discussed and monitors and equipment checked  Prep:  Pt Position: supine  Sterile barriers:cap, gloves, sterile barriers and mask  Prep: ChloraPrep  Patient monitoring: blood pressure monitoring, continuous pulse oximetry and EKG  Procedure  Sedation:yes  Performed under: general  Guidance:ultrasound guided  Images:still images obtained    Laterality:Bilateral  Block Type:TAP  Injection Technique:single-shot  Needle Type:short-bevel and echogenic  Needle Gauge:20 G      Medications  Comment:Block Injection:  LA dose divided between Right and Left block       Adjuncts:  Decadron 4mg PSF, Buprenex 0.3mg (Per total volume of LA)    Post Assessment  Injection Assessment: negative aspiration for heme, incremental injection and no paresthesia on injection  Patient Tolerance:comfortable throughout block  Complications:no  Additional Notes      Under Ultrasound guidance, a BBraun 4inch 360 degree needle was advanced with Normal Saline hydro dissection of tissue.  The Internal Oblique and Transversus Abdominus muscles where visualized.  At or before the aponeurosis of Internal Oblique, local anesthetic spread was visualized in the Transversus Abdominus Plane. Injection was made incrementally with aspiration every 5 mls.  There was no  intravascular injection,  injection pressure was normal, there was no neural injection, and the procedure was completed without difficulty.  Thank You.

## 2019-05-13 NOTE — ANESTHESIA POSTPROCEDURE EVALUATION
Patient: Joshua Power    Procedure Summary     Date:  05/13/19 Room / Location:   [facility] /  [facility]    Anesthesia Start:  1113 Anesthesia Stop:      Procedure:  LAPAROSCOPIC SIGMOID COLECTOMY LEFT, POSSIBLE OPEN (Left Abdomen) Diagnosis:      Surgeon:  Marco A Dumont MD Provider:  Rene Hammond MD    Anesthesia Type:  general ASA Status:  3          Anesthesia Type: general  Last vitals  BP   119/96   Temp   98.4   Pulse   68   Resp   16     SpO2   96%     Post Anesthesia Care and Evaluation    Patient location during evaluation: PACU  Patient participation: complete - patient participated  Level of consciousness: awake and alert  Pain score: 3  Pain management: adequate  Airway patency: patent  Anesthetic complications: No anesthetic complications  PONV Status: none  Cardiovascular status: hemodynamically stable and acceptable  Respiratory status: nonlabored ventilation, acceptable and nasal cannula  Hydration status: acceptable

## 2019-05-13 NOTE — PLAN OF CARE
Problem: Surgery Nonspecified (Adult)  Goal: Signs and Symptoms of Listed Potential Problems Will be Absent, Minimized or Managed (Surgery Nonspecified)  Outcome: Ongoing (interventions implemented as appropriate)   05/13/19 4957   Goal/Outcome Evaluation   Problems Assessed (Surgery) pain;bowel motility decreased;respiratory compromise   Problems Present (Surgery) bowel motility decreased;respiratory compromise;VTE (venous thromboembolism);situational response     Goal: Anesthesia/Sedation Recovery  Outcome: Ongoing (interventions implemented as appropriate)   05/13/19 8276   Goal/Outcome Evaluation   Anesthesia/Sedation Recovery ongoing sedation/anesthesia

## 2019-05-13 NOTE — ANESTHESIA PREPROCEDURE EVALUATION
Anesthesia Evaluation     Patient summary reviewed and Nursing notes reviewed   NPO Solid Status: > 8 hours  NPO Liquid Status: > 2 hours           Airway   Mallampati: II  TM distance: >3 FB  Neck ROM: full  No difficulty expected  Dental      Pulmonary    (+) sleep apnea,   (-) COPD, asthma, shortness of breath, recent URI, not a smoker  Cardiovascular     ECG reviewed    (+) hypertension, hyperlipidemia,   (-) CAD, dysrhythmias, angina, cardiac stents    ROS comment: Sinus rhythm with short TX  Early repolarization    Neuro/Psych  (+) headaches (depakote ), psychiatric history (klonopin ),     (-) seizures, CVA  GI/Hepatic/Renal/Endo    (+)   diabetes mellitus (A1C >7 ) type 2 poorly controlled using insulin,   (-) liver disease, no renal disease    Musculoskeletal     Abdominal    Substance History      OB/GYN          Other   (+) arthritis                   Anesthesia Plan    ASA 3     general   (TAPblocks , Propofol infusion,  Opiate sparing    BSL control )  intravenous induction   Anesthetic plan, all risks, benefits, and alternatives have been provided, discussed and informed consent has been obtained with: patient.    Plan discussed with CRNA.

## 2019-05-13 NOTE — OP NOTE
General Surgery Operative Note    Joshua Power  [id number]  1975    Date of Surgery:  5/13/2019 3:01 PM    Pre-op Diagnosis: Diverticulosis with a history of perforated diverticulitis    Post-op Diagnosis: Diverticulosis with a history of perforated diverticulitis    Procedure: Laparoscopic sigmoid colectomy                       Laparoscopic lysis of adhesions (45 minutes)                       Laparoscopic mobilization of the splenic flexure    Surgeon: Marco A Dumont MD    Assistant: SHRUTI Mackay    Anesthesia: General with Block    Fluids: 2 L crystalloid    Estimated Blood Loss: Less than 25 mL    Urine Voided: Greater than 250 mL    Specimens: Sigmoid colon     Drains: 19 Telugu Lazarus drain, pelvis    Findings: Diverticular disease around the sigmoid colon with adhesions to the small bowel    Extenuating circumstances: The complexity of the case was increased secondary to the patient's significant intra-abdominal adhesions from his prior abscess secondary to his perforated diverticular disease.  Proximally 45 minutes of the case were devoted to lysis of adhesions.    Complications: None apparent    History:   44-year-old gentleman with diabetes mellitus presented with perforated diverticular disease and its associated abscess earlier this year.  His abscess resolved with conservative management with IV antibiotics.  He presents today for elective partial colectomy.     The risks and benefits of laparoscopic sigmoid colectomy with mobilization of the splenic flexure were rehashed.  Our discussion included but was not limited to: bleeding, infection, injury to adjacent viscera (spleen, stomach, small bowel, etc.), anastomotic leak, postoperative abscess formation, need for re-intervention, stoma creation, an open operation in general, and medical issues from a cardiopulmonary and deep venous thrombosis standpoint.  All questions were answered and they understand and wish to proceed with  surgical intervention.    Procedure:      After informed consent, the patient was taken to the operating room and placed in the lithotomy position.  General anesthesia was induced, bilateral TAP blocks were placed by anesthesia, a Puente catheter was placed, the abdomen/perineum were then prepped and draped in the standard sterile fashion. A timeout was observed.     I began with an infraumbilical Lemus trocar cutdown.  The peritoneum was entered sharply, bilateral Vicryl fascial sutures placed, the blunt Lemus trocar was placed intra-abdominally, and the abdomen was insufflated.  He clearly had adhesions to the anterior abdominal wall and small bowel.  At this point I placed a left and right-sided mid abdomen 5 mm trochars under direct visualization without obvious complication.  Using the laparoscopic LigaSure the adhesions to the anterior abdominal wall, small bowel, and pelvic sidewall were all freed.  The lysis of adhesions took approximately 45 minutes and increase the complexity of the case.  Once I was able to free up the sigmoid colon from the surrounding tissue I incised the white line of Toldt following this up the descending colon freeing up the splenic flexure to adequate laxity for our anastomosis.  The colon was then reflected laterally and I incised the mesocolon at the level of the sacral promontory allowing for a bloodless dissection.  I placed a 12 mm trocar just off the right anterior superior iliac spine under direct visualization.  A Penrose drain was then placed around the colorectal junction.  Using the laparoscopic LigaSure I freed this down up to the rectum circumferentially.  The right and left ureter were identified and kept free from our operative field.  I then dissected proximally along the mesocolon dissecting the inferior mesenteric artery circumferentially.  Again I have visualized the left ureter.  A 6 the vascular load was taken across the inferior mesenteric artery.  There was  meticulous hemostasis.  I then took the remaining portion of the mesentery proximally and distally with the laparoscopic LigaSure.  Using blue loads and the 60 DANYELL stapler I took this across the proximal rectum.  At this point the colon was brought out the periumbilical incision after enlarging the fascial incision to allow this to be moved.  A wound protector was placed.  The colon was transected again with a blue load.  This was passed off the specimen.  The colon aperture was measured to take a 28 circular stapler.  A pursestring suture was placed around the proximal colon after sharply removing the staple line.  Meticulous hemostasis was obtained.  The circular stapler was then placed up the patient's anus and rectum and the stapler fired.  There were 2 good distal donuts.  The anastomosis appeared intact with rigid sigmoidoscopy.  A 19 Malawian Lazarus drain was then placed into the pelvis and brought out the right-sided 5 mm port.  The 12 elevator fascial defect was closed with 0 Vicryl.  All trochars were removed under direct visualization.  The infraumbilical fascial defect was closed with interrupted 0 Vicryl.  This was irrigated with bacitracin irrigation.  The skin was then reapproximated with a 4-0 subcuticular Monocryl.  All trocar sites were closed with a running 4-0 subcuticular Monocryl Mastisol, Steri-Strips, Telfa and Tegaderm were placed on the wounds the 19 Malawian Lazarus drain was sewn in with a 2-0 nylon and placed to bulb suction.  And abdominal binder was placed on the patient.     Sterile dressings were placed on the wounds.  All lap and needle counts were correct at the end of the procedure ×2.  The patient was then transferred to the PACU.    Marco A Dumont MD     Date: 5/13/2019  Time: 3:01 PM

## 2019-05-13 NOTE — ANESTHESIA PROCEDURE NOTES
Airway  Urgency: elective    Airway not difficult    General Information and Staff    Patient location during procedure: OR  CRNA: Maria Elena Macias CRNA    Indications and Patient Condition  Indications for airway management: airway protection    Preoxygenated: yes  MILS not maintained throughout  Mask difficulty assessment: 1 - vent by mask    Final Airway Details  Final airway type: endotracheal airway      Successful airway: ETT  Cuffed: yes   Successful intubation technique: direct laryngoscopy  Facilitating devices/methods: intubating stylet  Endotracheal tube insertion site: oral  Blade: Juan  Blade size: 3  ETT size (mm): 7.5  Cormack-Lehane Classification: grade I - full view of glottis  Placement verified by: chest auscultation and capnometry   Measured from: lips  ETT to lips (cm): 20  Number of attempts at approach: 1    Additional Comments  Negative epigastric sounds, Breath sound equal bilaterally with symmetric chest rise and fall

## 2019-05-13 NOTE — PROGRESS NOTES
"Night of Surgery Note    S:  No complaints.    /99 (BP Location: Left arm, Patient Position: Lying)   Pulse 81   Temp 98.2 °F (36.8 °C) (Oral)   Resp 18   Ht 175.3 cm (69\")   Wt 90.9 kg (200 lb 6.4 oz)   SpO2 94%   BMI 29.59 kg/m²     Physical Exam:  General: NAD, AAO  Abdomen: Soft, NT, mild distention.  Incisions: c/d/i.  JUDE with serosanguinous drainage.    A/P:  NOS for 44 year old male s/p laparoscopic sigmoid colectomy for diverticular disease.  Doing well.  - Continue current management      Juli Murillo MD  05/13/19  6:08 PM    "

## 2019-05-14 LAB
ANION GAP SERPL CALCULATED.3IONS-SCNC: 9 MMOL/L
BASOPHILS # BLD AUTO: 0.01 10*3/MM3 (ref 0–0.2)
BASOPHILS NFR BLD AUTO: 0.1 % (ref 0–1.5)
BUN BLD-MCNC: 19 MG/DL (ref 6–20)
BUN/CREAT SERPL: 18.6 (ref 7–25)
CALCIUM SPEC-SCNC: 8.6 MG/DL (ref 8.6–10.5)
CHLORIDE SERPL-SCNC: 101 MMOL/L (ref 98–107)
CO2 SERPL-SCNC: 27 MMOL/L (ref 22–29)
CREAT BLD-MCNC: 1.02 MG/DL (ref 0.76–1.27)
DEPRECATED RDW RBC AUTO: 43.9 FL (ref 37–54)
EOSINOPHIL # BLD AUTO: 0.01 10*3/MM3 (ref 0–0.4)
EOSINOPHIL NFR BLD AUTO: 0.1 % (ref 0.3–6.2)
ERYTHROCYTE [DISTWIDTH] IN BLOOD BY AUTOMATED COUNT: 12.7 % (ref 12.3–15.4)
GFR SERPL CREATININE-BSD FRML MDRD: 79 ML/MIN/1.73
GLUCOSE BLD-MCNC: 152 MG/DL (ref 65–99)
GLUCOSE BLDC GLUCOMTR-MCNC: 170 MG/DL (ref 70–130)
GLUCOSE BLDC GLUCOMTR-MCNC: 173 MG/DL (ref 70–130)
GLUCOSE BLDC GLUCOMTR-MCNC: 178 MG/DL (ref 70–130)
GLUCOSE BLDC GLUCOMTR-MCNC: 185 MG/DL (ref 70–130)
HCT VFR BLD AUTO: 40.2 % (ref 37.5–51)
HGB BLD-MCNC: 13.9 G/DL (ref 13–17.7)
IMM GRANULOCYTES # BLD AUTO: 0.05 10*3/MM3 (ref 0–0.05)
IMM GRANULOCYTES NFR BLD AUTO: 0.3 % (ref 0–0.5)
LYMPHOCYTES # BLD AUTO: 2.72 10*3/MM3 (ref 0.7–3.1)
LYMPHOCYTES NFR BLD AUTO: 18.2 % (ref 19.6–45.3)
MCH RBC QN AUTO: 32.9 PG (ref 26.6–33)
MCHC RBC AUTO-ENTMCNC: 34.6 G/DL (ref 31.5–35.7)
MCV RBC AUTO: 95 FL (ref 79–97)
MONOCYTES # BLD AUTO: 1.46 10*3/MM3 (ref 0.1–0.9)
MONOCYTES NFR BLD AUTO: 9.8 % (ref 5–12)
NEUTROPHILS # BLD AUTO: 10.76 10*3/MM3 (ref 1.7–7)
NEUTROPHILS NFR BLD AUTO: 71.8 % (ref 42.7–76)
PLATELET # BLD AUTO: 172 10*3/MM3 (ref 140–450)
PMV BLD AUTO: 10.8 FL (ref 6–12)
POTASSIUM BLD-SCNC: 4.7 MMOL/L (ref 3.5–5.2)
RBC # BLD AUTO: 4.23 10*6/MM3 (ref 4.14–5.8)
SODIUM BLD-SCNC: 137 MMOL/L (ref 136–145)
WBC NRBC COR # BLD: 14.96 10*3/MM3 (ref 3.4–10.8)

## 2019-05-14 PROCEDURE — 25010000002 MORPHINE PER 10 MG: Performed by: SURGERY

## 2019-05-14 PROCEDURE — 82962 GLUCOSE BLOOD TEST: CPT

## 2019-05-14 PROCEDURE — 25010000002 CEFOXITIN PER 1 G: Performed by: SURGERY

## 2019-05-14 PROCEDURE — 80048 BASIC METABOLIC PNL TOTAL CA: CPT | Performed by: SURGERY

## 2019-05-14 PROCEDURE — 25010000002 HEPARIN (PORCINE) PER 1000 UNITS: Performed by: SURGERY

## 2019-05-14 PROCEDURE — 85025 COMPLETE CBC W/AUTO DIFF WBC: CPT | Performed by: SURGERY

## 2019-05-14 RX ADMIN — MORPHINE SULFATE 4 MG: 4 INJECTION INTRAVENOUS at 13:46

## 2019-05-14 RX ADMIN — CEFOXITIN SODIUM 2 G: 1 POWDER, FOR SOLUTION INTRAVENOUS at 05:35

## 2019-05-14 RX ADMIN — HEPARIN SODIUM 5000 UNITS: 5000 INJECTION INTRAVENOUS; SUBCUTANEOUS at 15:21

## 2019-05-14 RX ADMIN — CLONAZEPAM 0.5 MG: 0.5 TABLET ORAL at 08:32

## 2019-05-14 RX ADMIN — FAMOTIDINE 20 MG: 20 TABLET ORAL at 20:33

## 2019-05-14 RX ADMIN — ACETAMINOPHEN 650 MG: 325 TABLET ORAL at 02:03

## 2019-05-14 RX ADMIN — PROPRANOLOL HYDROCHLORIDE 10 MG: 10 TABLET ORAL at 08:32

## 2019-05-14 RX ADMIN — DULOXETINE HYDROCHLORIDE 60 MG: 60 CAPSULE, DELAYED RELEASE ORAL at 08:33

## 2019-05-14 RX ADMIN — HEPARIN SODIUM 5000 UNITS: 5000 INJECTION INTRAVENOUS; SUBCUTANEOUS at 05:35

## 2019-05-14 RX ADMIN — MORPHINE SULFATE 4 MG: 4 INJECTION INTRAVENOUS at 11:48

## 2019-05-14 RX ADMIN — GABAPENTIN 400 MG: 400 CAPSULE ORAL at 15:21

## 2019-05-14 RX ADMIN — GABAPENTIN 400 MG: 400 CAPSULE ORAL at 08:32

## 2019-05-14 RX ADMIN — INSULIN HUMAN 3 UNITS: 100 INJECTION, SOLUTION PARENTERAL at 12:29

## 2019-05-14 RX ADMIN — DULOXETINE HYDROCHLORIDE 60 MG: 60 CAPSULE, DELAYED RELEASE ORAL at 20:33

## 2019-05-14 RX ADMIN — FAMOTIDINE 20 MG: 20 TABLET ORAL at 08:33

## 2019-05-14 RX ADMIN — CLONAZEPAM 0.5 MG: 0.5 TABLET ORAL at 20:33

## 2019-05-14 RX ADMIN — TRAZODONE HYDROCHLORIDE 150 MG: 50 TABLET ORAL at 20:33

## 2019-05-14 RX ADMIN — MORPHINE SULFATE 4 MG: 4 INJECTION INTRAVENOUS at 23:44

## 2019-05-14 RX ADMIN — SODIUM CHLORIDE, POTASSIUM CHLORIDE, SODIUM LACTATE AND CALCIUM CHLORIDE 75 ML/HR: 600; 310; 30; 20 INJECTION, SOLUTION INTRAVENOUS at 17:41

## 2019-05-14 RX ADMIN — INSULIN HUMAN 3 UNITS: 100 INJECTION, SOLUTION PARENTERAL at 23:46

## 2019-05-14 RX ADMIN — SODIUM CHLORIDE, POTASSIUM CHLORIDE, SODIUM LACTATE AND CALCIUM CHLORIDE 125 ML/HR: 600; 310; 30; 20 INJECTION, SOLUTION INTRAVENOUS at 05:35

## 2019-05-14 RX ADMIN — GABAPENTIN 400 MG: 400 CAPSULE ORAL at 20:33

## 2019-05-14 RX ADMIN — MORPHINE SULFATE 4 MG: 4 INJECTION INTRAVENOUS at 17:42

## 2019-05-14 RX ADMIN — HYDROCODONE BITARTRATE AND ACETAMINOPHEN 1 TABLET: 5; 325 TABLET ORAL at 20:33

## 2019-05-14 RX ADMIN — DIVALPROEX SODIUM 500 MG: 500 TABLET, DELAYED RELEASE ORAL at 08:32

## 2019-05-14 RX ADMIN — MORPHINE SULFATE 4 MG: 4 INJECTION INTRAVENOUS at 04:17

## 2019-05-14 RX ADMIN — MORPHINE SULFATE 4 MG: 4 INJECTION INTRAVENOUS at 08:32

## 2019-05-14 RX ADMIN — DIVALPROEX SODIUM 500 MG: 500 TABLET, DELAYED RELEASE ORAL at 20:33

## 2019-05-14 RX ADMIN — HEPARIN SODIUM 5000 UNITS: 5000 INJECTION INTRAVENOUS; SUBCUTANEOUS at 20:33

## 2019-05-14 RX ADMIN — INSULIN HUMAN 3 UNITS: 100 INJECTION, SOLUTION PARENTERAL at 05:35

## 2019-05-14 RX ADMIN — INSULIN HUMAN 3 UNITS: 100 INJECTION, SOLUTION PARENTERAL at 18:41

## 2019-05-14 RX ADMIN — HYDROCODONE BITARTRATE AND ACETAMINOPHEN 1 TABLET: 5; 325 TABLET ORAL at 13:05

## 2019-05-14 RX ADMIN — HYDROCODONE BITARTRATE AND ACETAMINOPHEN 1 TABLET: 5; 325 TABLET ORAL at 04:14

## 2019-05-14 NOTE — PROGRESS NOTES
Discharge Planning Assessment  [facility]     Patient Name: Joshua Power  MRN: [id number]  Today's Date: 5/14/2019    Admit Date: 5/13/2019    Discharge Needs Assessment     Row Name 05/14/19 1415       Living Environment    Lives With  alone    Current Living Arrangements  home/apartment/condo    Primary Care Provided by  self    Provides Primary Care For  no one    Family Caregiver if Needed  parent(s)    Quality of Family Relationships  helpful;involved;supportive    Able to Return to Prior Arrangements  yes    Living Arrangement Comments  Spoke with pt in room with permission in regards to discharge planning. Pt's mother present.  Pt resides in [facility] alone. Pt's mother plans to stay with pt and assist him if needed.        Resource/Environmental Concerns    Resource/Environmental Concerns  none       Transition Planning    Patient/Family Anticipates Transition to  home with family    Patient/Family Anticipated Services at Transition      Transportation Anticipated  car, drives self;family or friend will provide       Discharge Needs Assessment    Readmission Within the Last 30 Days  no previous admission in last 30 days    Concerns to be Addressed  denies needs/concerns at this time    Equipment Currently Used at Home  bipap/cpap;glucometer Pt reports he doesn't work CPAP much as he is working with PCP to get another  sleep study.     Anticipated Changes Related to Illness  other (see comments) Post op recovery period    Equipment Needed After Discharge  bipap/cpap;glucometer    Discharge Coordination/Progress  Pt has [employer] [employer] and denies recent changes in insurance. Pt has prescription coverage and uses [facility] on [address], requests [facility] at discharge. Plan is home with pt's mother at discharge. Pt denies discharge needs. CM will cont to follow.        Discharge Plan     Row Name 05/14/19 1427       Plan    Plan  discharge plan    Patient/Family in Agreement  with Plan  yes    Plan Comments  Plan is home and his mother with assist him as needed.  Pt denies discharge needs.  CM will cont to follow.        Destination      No service coordination in this encounter.      Durable Medical Equipment      No service coordination in this encounter.      Dialysis/Infusion      No service coordination in this encounter.      Home Medical Care      No service coordination in this encounter.      Therapy      No service coordination in this encounter.      Community Resources      No service coordination in this encounter.        Expected Discharge Date and Time     Expected Discharge Date Expected Discharge Time    May 16, 2019         Demographic Summary     Row Name 05/14/19 1407       General Information    General Information Comments  Pt's PCP is Sachin Strickland       Contact Information    Permission Granted to Share Info With      Contact Information Obtained for      Contact Information Comments  Sana Power(mother):  [phone]        Functional Status     Row Name 05/14/19 1403       Functional Status    Functional Status Comments  Pt's is independent of ADLs        Psychosocial    No documentation.       Abuse/Neglect    No documentation.       Legal    No documentation.       Substance Abuse    No documentation.       Patient Forms    No documentation.           Megan Joya RN

## 2019-05-14 NOTE — PROGRESS NOTES
"General Surgery Post op Follow Up Note    Subjective:   Pain controlled. Doing okay    /80 (BP Location: Left arm, Patient Position: Lying)   Pulse 85   Temp 98.8 °F (37.1 °C)   Resp 18   Ht 175.3 cm (69\")   Wt 90.9 kg (200 lb 6.4 oz)   SpO2 95%   BMI 29.59 kg/m²     General Appearance:  in no acute distress  Abdomen: incision is clean and dry, JUDE benign    CBC  Results from last 7 days   Lab Units 05/14/19  0633   WBC 10*3/mm3 14.96*   HEMOGLOBIN g/dL 13.9   HEMATOCRIT % 40.2   PLATELETS 10*3/mm3 172       CMP/BMP  Results from last 7 days   Lab Units 05/14/19  0633   SODIUM mmol/L 137   POTASSIUM mmol/L 4.7   CHLORIDE mmol/L 101   CO2 mmol/L 27.0   BUN mg/dL 19   CREATININE mg/dL 1.02   CALCIUM mg/dL 8.6   GLUCOSE mg/dL 152*         ASSESSMENT/PLAN:    Clear liquids for today.  Mobilize.  Remove call catheter.    Marco A Dumont MD  5/14/2019  1:21 PM  "

## 2019-05-14 NOTE — PLAN OF CARE
Problem: Patient Care Overview  Goal: Plan of Care Review  Outcome: Ongoing (interventions implemented as appropriate)   05/14/19 0432   Coping/Psychosocial   Plan of Care Reviewed With patient   Plan of Care Review   Progress no change   OTHER   Outcome Summary pt rested well throughout night, pain control x 2, no c/o n/v/d, vss, incision c/d/i, ambulated x1 throughout shift, call in place, will continue to monitor.     Goal: Individualization and Mutuality  Outcome: Ongoing (interventions implemented as appropriate)    Goal: Discharge Needs Assessment  Outcome: Ongoing (interventions implemented as appropriate)      Problem: Surgery Nonspecified (Adult)  Goal: Signs and Symptoms of Listed Potential Problems Will be Absent, Minimized or Managed (Surgery Nonspecified)  Outcome: Ongoing (interventions implemented as appropriate)

## 2019-05-15 LAB
CYTO UR: NORMAL
GLUCOSE BLDC GLUCOMTR-MCNC: 175 MG/DL (ref 70–130)
GLUCOSE BLDC GLUCOMTR-MCNC: 178 MG/DL (ref 70–130)
GLUCOSE BLDC GLUCOMTR-MCNC: 182 MG/DL (ref 70–130)
GLUCOSE BLDC GLUCOMTR-MCNC: 220 MG/DL (ref 70–130)
LAB AP CASE REPORT: NORMAL
LAB AP CLINICAL INFORMATION: NORMAL
PATH REPORT.FINAL DX SPEC: NORMAL
PATH REPORT.GROSS SPEC: NORMAL

## 2019-05-15 PROCEDURE — 82962 GLUCOSE BLOOD TEST: CPT

## 2019-05-15 PROCEDURE — 63710000001 INSULIN REGULAR HUMAN PER 5 UNITS: Performed by: SURGERY

## 2019-05-15 PROCEDURE — 25010000002 MORPHINE PER 10 MG: Performed by: SURGERY

## 2019-05-15 PROCEDURE — 25010000002 HEPARIN (PORCINE) PER 1000 UNITS: Performed by: SURGERY

## 2019-05-15 PROCEDURE — 25010000002 METHYLNALTREXONE 12 MG/0.6ML SOLUTION: Performed by: SURGERY

## 2019-05-15 RX ORDER — OXYCODONE HYDROCHLORIDE AND ACETAMINOPHEN 5; 325 MG/1; MG/1
1 TABLET ORAL EVERY 6 HOURS PRN
Status: DISCONTINUED | OUTPATIENT
Start: 2019-05-15 | End: 2019-05-16

## 2019-05-15 RX ADMIN — MORPHINE SULFATE 4 MG: 4 INJECTION INTRAVENOUS at 10:54

## 2019-05-15 RX ADMIN — FAMOTIDINE 20 MG: 20 TABLET ORAL at 08:00

## 2019-05-15 RX ADMIN — INSULIN HUMAN 3 UNITS: 100 INJECTION, SOLUTION PARENTERAL at 17:41

## 2019-05-15 RX ADMIN — CLONAZEPAM 0.5 MG: 0.5 TABLET ORAL at 08:00

## 2019-05-15 RX ADMIN — GABAPENTIN 400 MG: 400 CAPSULE ORAL at 22:09

## 2019-05-15 RX ADMIN — INSULIN HUMAN 3 UNITS: 100 INJECTION, SOLUTION PARENTERAL at 05:11

## 2019-05-15 RX ADMIN — INSULIN HUMAN 5 UNITS: 100 INJECTION, SOLUTION PARENTERAL at 22:14

## 2019-05-15 RX ADMIN — HEPARIN SODIUM 5000 UNITS: 5000 INJECTION INTRAVENOUS; SUBCUTANEOUS at 22:13

## 2019-05-15 RX ADMIN — PROPRANOLOL HYDROCHLORIDE 10 MG: 10 TABLET ORAL at 08:00

## 2019-05-15 RX ADMIN — MORPHINE SULFATE 4 MG: 4 INJECTION INTRAVENOUS at 16:21

## 2019-05-15 RX ADMIN — INSULIN HUMAN 3 UNITS: 100 INJECTION, SOLUTION PARENTERAL at 13:20

## 2019-05-15 RX ADMIN — DIVALPROEX SODIUM 500 MG: 500 TABLET, DELAYED RELEASE ORAL at 08:00

## 2019-05-15 RX ADMIN — DULOXETINE HYDROCHLORIDE 60 MG: 60 CAPSULE, DELAYED RELEASE ORAL at 08:00

## 2019-05-15 RX ADMIN — CLONAZEPAM 0.5 MG: 0.5 TABLET ORAL at 22:09

## 2019-05-15 RX ADMIN — DULOXETINE HYDROCHLORIDE 60 MG: 60 CAPSULE, DELAYED RELEASE ORAL at 22:09

## 2019-05-15 RX ADMIN — OXYCODONE HYDROCHLORIDE AND ACETAMINOPHEN 1 TABLET: 5; 325 TABLET ORAL at 17:41

## 2019-05-15 RX ADMIN — TRAZODONE HYDROCHLORIDE 150 MG: 50 TABLET ORAL at 22:09

## 2019-05-15 RX ADMIN — MORPHINE SULFATE 4 MG: 4 INJECTION INTRAVENOUS at 05:11

## 2019-05-15 RX ADMIN — HEPARIN SODIUM 5000 UNITS: 5000 INJECTION INTRAVENOUS; SUBCUTANEOUS at 05:11

## 2019-05-15 RX ADMIN — GABAPENTIN 400 MG: 400 CAPSULE ORAL at 17:44

## 2019-05-15 RX ADMIN — FAMOTIDINE 20 MG: 20 TABLET ORAL at 22:09

## 2019-05-15 RX ADMIN — METHYLNALTREXONE BROMIDE 6 MG: 12 INJECTION, SOLUTION SUBCUTANEOUS at 08:00

## 2019-05-15 RX ADMIN — GABAPENTIN 400 MG: 400 CAPSULE ORAL at 08:00

## 2019-05-15 RX ADMIN — DIVALPROEX SODIUM 500 MG: 500 TABLET, DELAYED RELEASE ORAL at 22:09

## 2019-05-15 RX ADMIN — HYDROCODONE BITARTRATE AND ACETAMINOPHEN 1 TABLET: 5; 325 TABLET ORAL at 04:42

## 2019-05-15 NOTE — PROGRESS NOTES
Continued Stay Note   [facility]     Patient Name: Joshua Power  MRN: [id number]  Today's Date: 5/15/2019    Admit Date: 5/13/2019    Discharge Plan     Row Name 05/15/19 1348       Plan    Plan  discharge plan    Patient/Family in Agreement with Plan  yes    Plan Comments  SPoke with pt and pt's mother in room regarding discharge plan. Plan is home with family at discharge. Pt denies discharge needs. CM will cont to follow,        Discharge Codes    No documentation.       Expected Discharge Date and Time     Expected Discharge Date Expected Discharge Time    May 16, 2019             Megan Joya RN

## 2019-05-15 NOTE — PLAN OF CARE
Problem: Pain, Acute (Adult)  Intervention: Monitor and Manage Analgesia   05/15/19 1430   Promote Effective Bowel Elimination   Bowel Intervention adequate fluid intake promoted;ambulation promoted;diet adjusted;privacy promoted   Safety Management   Medication Review/Management medications reviewed;dosing adjusted     Intervention: Mutually Develop/Implement Acute Pain Management Plan   05/15/19 1430   Promote Oxygenation/Perfusion   Pain Management Interventions awakened for pain meds per patient request;care clustered;diversional activity provided;pillow support provided;position adjusted   Cognitive Interventions   Sensory Stimulation Regulation lighting decreased;care clustered     Intervention: Support/Optimize Psychosocial Response to Acute Pain   05/15/19 1430   Coping/Psychosocial Interventions   Supportive Measures self-care encouraged   Psychosocial Support   Family/Support System Care self-care encouraged   Interventions   Trust Relationship/Rapport care explained

## 2019-05-15 NOTE — PLAN OF CARE
Problem: Patient Care Overview  Goal: Plan of Care Review  Outcome: Ongoing (interventions implemented as appropriate)   05/15/19 0135   Coping/Psychosocial   Plan of Care Reviewed With patient   Plan of Care Review   Progress improving   OTHER   Outcome Summary pt rested well throughout night, vss, no c/o n/v/d, voiding well post catheter removal, anxious about having bowel movement, ambulated in wen multiple times, pain medication x3, will continue to monitor.     Goal: Individualization and Mutuality  Outcome: Ongoing (interventions implemented as appropriate)    Goal: Discharge Needs Assessment  Outcome: Ongoing (interventions implemented as appropriate)      Problem: Surgery Nonspecified (Adult)  Goal: Signs and Symptoms of Listed Potential Problems Will be Absent, Minimized or Managed (Surgery Nonspecified)  Outcome: Ongoing (interventions implemented as appropriate)

## 2019-05-15 NOTE — PROGRESS NOTES
"General Surgery Post op Follow Up Note    Subjective:   Feels okay. No GI function yet.    /79 (BP Location: Left leg, Patient Position: Lying)   Pulse 97   Temp 98.3 °F (36.8 °C) (Axillary)   Resp 16   Ht 175.3 cm (69\")   Wt 90.9 kg (200 lb 6.4 oz)   SpO2 90%   BMI 29.59 kg/m²     General Appearance:  in no acute distress  Abdomen: incisions are fine, JUDE benign    CBC  Results from last 7 days   Lab Units 05/14/19  0633   WBC 10*3/mm3 14.96*   HEMOGLOBIN g/dL 13.9   HEMATOCRIT % 40.2   PLATELETS 10*3/mm3 172       CMP/BMP  Results from last 7 days   Lab Units 05/14/19  0633   SODIUM mmol/L 137   POTASSIUM mmol/L 4.7   CHLORIDE mmol/L 101   CO2 mmol/L 27.0   BUN mg/dL 19   CREATININE mg/dL 1.02   CALCIUM mg/dL 8.6   GLUCOSE mg/dL 152*         ASSESSMENT/PLAN:    Full liquids.  Mobilize.  Supportive care.    Marco A Dumont MD  5/15/2019  12:17 PM  "

## 2019-05-16 LAB
ALBUMIN SERPL-MCNC: 3.4 G/DL (ref 3.5–5.2)
ALBUMIN/GLOB SERPL: 1 G/DL
ALP SERPL-CCNC: 65 U/L (ref 39–117)
ALT SERPL W P-5'-P-CCNC: 25 U/L (ref 1–41)
ANION GAP SERPL CALCULATED.3IONS-SCNC: 14 MMOL/L
AST SERPL-CCNC: 16 U/L (ref 1–40)
BILIRUB SERPL-MCNC: 0.7 MG/DL (ref 0.2–1.2)
BUN BLD-MCNC: 10 MG/DL (ref 6–20)
BUN/CREAT SERPL: 12.8 (ref 7–25)
CALCIUM SPEC-SCNC: 9.2 MG/DL (ref 8.6–10.5)
CHLORIDE SERPL-SCNC: 96 MMOL/L (ref 98–107)
CO2 SERPL-SCNC: 28 MMOL/L (ref 22–29)
CREAT BLD-MCNC: 0.78 MG/DL (ref 0.76–1.27)
DEPRECATED RDW RBC AUTO: 44.8 FL (ref 37–54)
ERYTHROCYTE [DISTWIDTH] IN BLOOD BY AUTOMATED COUNT: 12.7 % (ref 12.3–15.4)
GFR SERPL CREATININE-BSD FRML MDRD: 108 ML/MIN/1.73
GLOBULIN UR ELPH-MCNC: 3.3 GM/DL
GLUCOSE BLD-MCNC: 133 MG/DL (ref 65–99)
GLUCOSE BLDC GLUCOMTR-MCNC: 149 MG/DL (ref 70–130)
GLUCOSE BLDC GLUCOMTR-MCNC: 150 MG/DL (ref 70–130)
GLUCOSE BLDC GLUCOMTR-MCNC: 160 MG/DL (ref 70–130)
GLUCOSE BLDC GLUCOMTR-MCNC: 181 MG/DL (ref 70–130)
HCT VFR BLD AUTO: 38.7 % (ref 37.5–51)
HGB BLD-MCNC: 13.2 G/DL (ref 13–17.7)
MCH RBC QN AUTO: 32.8 PG (ref 26.6–33)
MCHC RBC AUTO-ENTMCNC: 34.1 G/DL (ref 31.5–35.7)
MCV RBC AUTO: 96.3 FL (ref 79–97)
PLATELET # BLD AUTO: 138 10*3/MM3 (ref 140–450)
PMV BLD AUTO: 10.6 FL (ref 6–12)
POTASSIUM BLD-SCNC: 3.6 MMOL/L (ref 3.5–5.2)
PROT SERPL-MCNC: 6.7 G/DL (ref 6–8.5)
RBC # BLD AUTO: 4.02 10*6/MM3 (ref 4.14–5.8)
SODIUM BLD-SCNC: 138 MMOL/L (ref 136–145)
WBC NRBC COR # BLD: 8.82 10*3/MM3 (ref 3.4–10.8)

## 2019-05-16 PROCEDURE — 85027 COMPLETE CBC AUTOMATED: CPT | Performed by: SURGERY

## 2019-05-16 PROCEDURE — 82962 GLUCOSE BLOOD TEST: CPT

## 2019-05-16 PROCEDURE — 80053 COMPREHEN METABOLIC PANEL: CPT | Performed by: SURGERY

## 2019-05-16 PROCEDURE — 25010000002 HEPARIN (PORCINE) PER 1000 UNITS: Performed by: SURGERY

## 2019-05-16 RX ORDER — PYRIDOSTIGMINE BROMIDE 60 MG/1
30 TABLET ORAL ONCE
Status: COMPLETED | OUTPATIENT
Start: 2019-05-16 | End: 2019-05-16

## 2019-05-16 RX ORDER — OXYCODONE AND ACETAMINOPHEN 7.5; 325 MG/1; MG/1
1 TABLET ORAL EVERY 6 HOURS PRN
Status: DISCONTINUED | OUTPATIENT
Start: 2019-05-16 | End: 2019-05-17 | Stop reason: HOSPADM

## 2019-05-16 RX ADMIN — FAMOTIDINE 20 MG: 20 TABLET ORAL at 21:38

## 2019-05-16 RX ADMIN — INSULIN HUMAN 3 UNITS: 100 INJECTION, SOLUTION PARENTERAL at 08:46

## 2019-05-16 RX ADMIN — HEPARIN SODIUM 5000 UNITS: 5000 INJECTION INTRAVENOUS; SUBCUTANEOUS at 05:48

## 2019-05-16 RX ADMIN — INSULIN HUMAN 3 UNITS: 100 INJECTION, SOLUTION PARENTERAL at 21:41

## 2019-05-16 RX ADMIN — CLONAZEPAM 0.5 MG: 0.5 TABLET ORAL at 07:55

## 2019-05-16 RX ADMIN — DULOXETINE HYDROCHLORIDE 60 MG: 60 CAPSULE, DELAYED RELEASE ORAL at 07:56

## 2019-05-16 RX ADMIN — PYRIDOSTIGMINE BROMIDE 30 MG: 60 TABLET ORAL at 13:48

## 2019-05-16 RX ADMIN — HEPARIN SODIUM 5000 UNITS: 5000 INJECTION INTRAVENOUS; SUBCUTANEOUS at 21:38

## 2019-05-16 RX ADMIN — OXYCODONE HYDROCHLORIDE AND ACETAMINOPHEN 1 TABLET: 7.5; 325 TABLET ORAL at 15:30

## 2019-05-16 RX ADMIN — GABAPENTIN 400 MG: 400 CAPSULE ORAL at 21:38

## 2019-05-16 RX ADMIN — DIVALPROEX SODIUM 500 MG: 500 TABLET, DELAYED RELEASE ORAL at 22:19

## 2019-05-16 RX ADMIN — INSULIN HUMAN 3 UNITS: 100 INJECTION, SOLUTION PARENTERAL at 17:40

## 2019-05-16 RX ADMIN — OXYCODONE HYDROCHLORIDE AND ACETAMINOPHEN 1 TABLET: 5; 325 TABLET ORAL at 02:29

## 2019-05-16 RX ADMIN — DULOXETINE HYDROCHLORIDE 60 MG: 60 CAPSULE, DELAYED RELEASE ORAL at 21:38

## 2019-05-16 RX ADMIN — OXYCODONE HYDROCHLORIDE AND ACETAMINOPHEN 1 TABLET: 7.5; 325 TABLET ORAL at 21:41

## 2019-05-16 RX ADMIN — PROPRANOLOL HYDROCHLORIDE 10 MG: 10 TABLET ORAL at 07:55

## 2019-05-16 RX ADMIN — HEPARIN SODIUM 5000 UNITS: 5000 INJECTION INTRAVENOUS; SUBCUTANEOUS at 13:48

## 2019-05-16 RX ADMIN — OXYCODONE HYDROCHLORIDE AND ACETAMINOPHEN 1 TABLET: 5; 325 TABLET ORAL at 10:09

## 2019-05-16 RX ADMIN — GABAPENTIN 400 MG: 400 CAPSULE ORAL at 15:25

## 2019-05-16 RX ADMIN — CLONAZEPAM 0.5 MG: 0.5 TABLET ORAL at 21:37

## 2019-05-16 RX ADMIN — FAMOTIDINE 20 MG: 20 TABLET ORAL at 07:56

## 2019-05-16 RX ADMIN — TRAZODONE HYDROCHLORIDE 150 MG: 50 TABLET ORAL at 21:37

## 2019-05-16 RX ADMIN — GABAPENTIN 400 MG: 400 CAPSULE ORAL at 07:56

## 2019-05-16 RX ADMIN — DIVALPROEX SODIUM 500 MG: 500 TABLET, DELAYED RELEASE ORAL at 07:56

## 2019-05-16 NOTE — NURSING NOTE
Dr. Dumont at the patients bedside. Verbal order given to saline lock fluids and for patient to increase  Ambulation.

## 2019-05-16 NOTE — PLAN OF CARE
Problem: Pain, Acute (Adult)  Intervention: Monitor and Manage Analgesia   05/16/19 1228   Promote Effective Bowel Elimination   Bowel Intervention adequate fluid intake promoted;ambulation promoted;chewing gum   Safety Management   Medication Review/Management medications reviewed;high risk medications identified     Intervention: Mutually Develop/Implement Acute Pain Management Plan   05/16/19 1228   Promote Oxygenation/Perfusion   Pain Management Interventions pillow support provided;position adjusted;diversional activity provided   Cognitive Interventions   Sensory Stimulation Regulation lighting decreased;quiet environment promoted     Intervention: Support/Optimize Psychosocial Response to Acute Pain   05/16/19 1228   Coping/Psychosocial Interventions   Supportive Measures active listening utilized   Psychosocial Support   Diversional Activities smartphone   Family/Support System Care self-care encouraged   Interventions   Trust Relationship/Rapport care explained

## 2019-05-16 NOTE — PROGRESS NOTES
"General Surgery Post op Follow Up Note    Subjective:   Feeling better.  No GI function yet.    /83 (BP Location: Left arm, Patient Position: Lying)   Pulse 89   Temp 97.7 °F (36.5 °C) (Oral)   Resp 18   Ht 175.3 cm (69\")   Wt 90.9 kg (200 lb 6.4 oz)   SpO2 90%   BMI 29.59 kg/m²     General Appearance:  in no acute distress  Abdomen: incision is clean and dry, JUDE benign.    CBC  Results from last 7 days   Lab Units 05/16/19  0452   WBC 10*3/mm3 8.82   HEMOGLOBIN g/dL 13.2   HEMATOCRIT % 38.7   PLATELETS 10*3/mm3 138*       CMP/BMP  Results from last 7 days   Lab Units 05/16/19  0452   SODIUM mmol/L 138   POTASSIUM mmol/L 3.6   CHLORIDE mmol/L 96*   CO2 mmol/L 28.0   BUN mg/dL 10   CREATININE mg/dL 0.78   CALCIUM mg/dL 9.2   BILIRUBIN mg/dL 0.7   ALK PHOS U/L 65   ALT (SGPT) U/L 25   AST (SGOT) U/L 16   GLUCOSE mg/dL 133*         ASSESSMENT/PLAN:    Full liquids.  Mobilize.  Mestinon times one.    Marco A Dumont MD  5/16/2019  1:16 PM  "

## 2019-05-17 VITALS
HEART RATE: 99 BPM | TEMPERATURE: 98.3 F | SYSTOLIC BLOOD PRESSURE: 118 MMHG | WEIGHT: 200.4 LBS | RESPIRATION RATE: 18 BRPM | DIASTOLIC BLOOD PRESSURE: 80 MMHG | BODY MASS INDEX: 29.68 KG/M2 | HEIGHT: 69 IN | OXYGEN SATURATION: 90 %

## 2019-05-17 LAB
GLUCOSE BLDC GLUCOMTR-MCNC: 154 MG/DL (ref 70–130)
GLUCOSE BLDC GLUCOMTR-MCNC: 173 MG/DL (ref 70–130)

## 2019-05-17 PROCEDURE — 25010000002 HEPARIN (PORCINE) PER 1000 UNITS: Performed by: SURGERY

## 2019-05-17 PROCEDURE — 25010000002 METHYLNALTREXONE 12 MG/0.6ML SOLUTION: Performed by: SURGERY

## 2019-05-17 PROCEDURE — 82962 GLUCOSE BLOOD TEST: CPT

## 2019-05-17 RX ORDER — OXYCODONE AND ACETAMINOPHEN 7.5; 325 MG/1; MG/1
1 TABLET ORAL EVERY 6 HOURS PRN
Qty: 15 TABLET | Refills: 0 | Status: SHIPPED | OUTPATIENT
Start: 2019-05-17 | End: 2019-05-26

## 2019-05-17 RX ADMIN — DULOXETINE HYDROCHLORIDE 60 MG: 60 CAPSULE, DELAYED RELEASE ORAL at 08:55

## 2019-05-17 RX ADMIN — INSULIN HUMAN 3 UNITS: 100 INJECTION, SOLUTION PARENTERAL at 08:55

## 2019-05-17 RX ADMIN — PROPRANOLOL HYDROCHLORIDE 10 MG: 10 TABLET ORAL at 08:55

## 2019-05-17 RX ADMIN — DIVALPROEX SODIUM 500 MG: 500 TABLET, DELAYED RELEASE ORAL at 08:54

## 2019-05-17 RX ADMIN — HEPARIN SODIUM 5000 UNITS: 5000 INJECTION INTRAVENOUS; SUBCUTANEOUS at 05:58

## 2019-05-17 RX ADMIN — INSULIN HUMAN 3 UNITS: 100 INJECTION, SOLUTION PARENTERAL at 13:21

## 2019-05-17 RX ADMIN — FAMOTIDINE 20 MG: 20 TABLET ORAL at 08:54

## 2019-05-17 RX ADMIN — OXYCODONE HYDROCHLORIDE AND ACETAMINOPHEN 1 TABLET: 7.5; 325 TABLET ORAL at 06:01

## 2019-05-17 RX ADMIN — METHYLNALTREXONE BROMIDE 6 MG: 12 INJECTION, SOLUTION SUBCUTANEOUS at 08:55

## 2019-05-17 RX ADMIN — CLONAZEPAM 0.5 MG: 0.5 TABLET ORAL at 08:54

## 2019-05-17 RX ADMIN — GABAPENTIN 400 MG: 400 CAPSULE ORAL at 08:55

## 2019-05-17 NOTE — PAYOR COMM NOTE
"Joshua Nielson (44 y.o. Male)     Date of Birth Social Security Number Address Home Phone MRN    1975  [address]  [address]  [facility] [facility] [id number] [phone] [id number]    Scientologist Marital Status          None        Admission Date Admission Type Admitting Provider Attending Provider Department, Room/Bed    5/13/19 Elective Marco A Dumont MD Baehler, Richard David, MD [address], [address]    Discharge Date Discharge Disposition Discharge Destination         Home or Self Care              Attending Provider:  Marco A Dumont MD    Allergies:  Nsaids    Isolation:  None   Infection:  None   Code Status:  CPR    Ht:  175.3 cm (69\")   Wt:  90.9 kg (200 lb 6.4 oz)    Admission Cmt:  None   Principal Problem:  None                Active Insurance as of 5/13/2019     Primary Coverage     Payor Plan Insurance Group Employer/Plan Group    [employer] MEDICAID [place] MED[place]ID [id number]     Payor Plan Address Payor Plan Phone Number Payor Plan Fax Number Effective Dates    [address] [address] [phone]  10/1/2016 - None Entered    [facility] [address]       Subscriber Name Subscriber Birth Date Member ID       JOSHUA NIELSON 1975 [id number]                 Emergency Contacts      (Rel.) Home Phone Work Phone Mobile Phone    Sana Nielson (Mother) -- -- [phone]               Discharge Summary      Marco A Dumont MD at 5/17/2019 11:37 AM          General Surgery Discharge Note (Dr. HELLEN Dumont)    Sachin Strickland MD    Patient Name:  Joshua Nileson  Admission Date:  5/13/2019  Discharge Date:  5/17/2019    Diagnosis/Problems:  Problem List Items Addressed This Visit     None      Visit Diagnoses     Sigmoid diverticulitis        Relevant Orders    Tissue Pathology Exam (Completed)          History & Hospital Course: 44-year-old gentleman presented for elective laparoscopic sigmoid colectomy.  Earlier this " year he had perforated sigmoid diverticular disease which was managed medically.  He improved nicely.  On 5/13/2019 he underwent a uneventful laparoscopic sigmoid colectomy.  Postoperatively he has done quite well.  His JUDE drain has been removed.  The patient is tolerating an appropriate diet, having GI function, and is ready to be discharged.  Pathology benign diverticular disease.    Temp:  [98.1 °F (36.7 °C)-98.3 °F (36.8 °C)] 98.3 °F (36.8 °C)  Heart Rate:  [84-99] 99  Resp:  [18] 18  BP: (118-132)/(80-81) 118/80    Physical: appropriate post operative examination.      Assessment:  Doing well may DC home.    Plan:    DC Home  Diet: Regular GI soft diet as tolerated  Restrictions: No heavy lifting greater than 10 lbs.  May shower as needed, no tub bathes.  Do not submerge the incisions underwater.    Prescriptions: Percocet 7.5 mg tablets #15, no refills  May resume all prior home medications.  No current facility-administered medications on file prior to encounter.      Current Outpatient Medications on File Prior to Encounter   Medication Sig Dispense Refill   • clonazePAM (KlonoPIN) 0.5 MG tablet Take 0.5 mg by mouth. TAKE 0.5MG IN AM, TAKE 1MG DAILY AT 4PM      • divalproex (DEPAKOTE) 500 MG DR tablet Take 1,000 mg by mouth 2 (Two) Times a Day.     • DULoxetine (CYMBALTA) 60 MG capsule Take 60 mg by mouth 2 (Two) Times a Day.     • ferrous gluconate 324 (37.5 Fe) MG tablet tablet Take 1 mg by mouth Daily With Breakfast.     • insulin lispro (humaLOG) 100 UNIT/ML injection Inject 30 Units under the skin into the appropriate area as directed 2 (Two) Times a Day.     • propranolol (INDERAL) 10 MG tablet Take 10 mg by mouth Daily.     • traZODone (DESYREL) 150 MG tablet Take 150 mg by mouth Every Night.          Follow up in 2 weeks at [facility], [phone].    Marco A Dumont MD,  5/17/2019 - 11:38 AM    Electronically signed by Marco A Dumont MD at 5/17/2019 11:40 AM

## 2019-05-17 NOTE — DISCHARGE SUMMARY
General Surgery Discharge Note (Dr. HELLEN Dumont)    Sachin Strickland MD    Patient Name:  Joshua Power  Admission Date:  5/13/2019  Discharge Date:  5/17/2019    Diagnosis/Problems:  Problem List Items Addressed This Visit     None      Visit Diagnoses     Sigmoid diverticulitis        Relevant Orders    Tissue Pathology Exam (Completed)          History & Hospital Course: 44-year-old gentleman presented for elective laparoscopic sigmoid colectomy.  Earlier this year he had perforated sigmoid diverticular disease which was managed medically.  He improved nicely.  On 5/13/2019 he underwent a uneventful laparoscopic sigmoid colectomy.  Postoperatively he has done quite well.  His JUDE drain has been removed.  The patient is tolerating an appropriate diet, having GI function, and is ready to be discharged.  Pathology benign diverticular disease.    Temp:  [98.1 °F (36.7 °C)-98.3 °F (36.8 °C)] 98.3 °F (36.8 °C)  Heart Rate:  [84-99] 99  Resp:  [18] 18  BP: (118-132)/(80-81) 118/80    Physical: appropriate post operative examination.      Assessment:  Doing well may DC home.    Plan:    DC Home  Diet: Regular GI soft diet as tolerated  Restrictions: No heavy lifting greater than 10 lbs.  May shower as needed, no tub bathes.  Do not submerge the incisions underwater.    Prescriptions: Percocet 7.5 mg tablets #15, no refills  May resume all prior home medications.  No current facility-administered medications on file prior to encounter.      Current Outpatient Medications on File Prior to Encounter   Medication Sig Dispense Refill   • clonazePAM (KlonoPIN) 0.5 MG tablet Take 0.5 mg by mouth. TAKE 0.5MG IN AM, TAKE 1MG DAILY AT 4PM      • divalproex (DEPAKOTE) 500 MG DR tablet Take 1,000 mg by mouth 2 (Two) Times a Day.     • DULoxetine (CYMBALTA) 60 MG capsule Take 60 mg by mouth 2 (Two) Times a Day.     • ferrous gluconate 324 (37.5 Fe) MG tablet tablet Take 1 mg by mouth Daily With Breakfast.     • insulin  lispro (humaLOG) 100 UNIT/ML injection Inject 30 Units under the skin into the appropriate area as directed 2 (Two) Times a Day.     • propranolol (INDERAL) 10 MG tablet Take 10 mg by mouth Daily.     • traZODone (DESYREL) 150 MG tablet Take 150 mg by mouth Every Night.          Follow up in 2 weeks at [facility], [phone].    Marco A Dumont MD,  5/17/2019 - 11:38 AM

## 2019-05-17 NOTE — PROGRESS NOTES
Case Management Discharge Note    Final Note: Spoke with pt and pt's mother in room regarding discharge plan.  Plan is home with his mother. Pt denies discharge needs. Family will transport pt home.     Destination      No service has been selected for the patient.      Durable Medical Equipment      No service has been selected for the patient.      Dialysis/Infusion      No service has been selected for the patient.      Home Medical Care      No service has been selected for the patient.      Therapy      No service has been selected for the patient.      Community Resources      No service has been selected for the patient.             Final Discharge Disposition Code: 01 - home or self-care

## 2019-05-18 ENCOUNTER — READMISSION MANAGEMENT (OUTPATIENT)
Dept: CALL CENTER | Facility: HOSPITAL | Age: 44
End: 2019-05-18

## 2019-05-18 NOTE — OUTREACH NOTE
Prep Survey      Responses   Facility patient discharged from?  [place]   Is patient eligible?  Yes   Discharge diagnosis                                                       elective laparoscopic sigmoid colectomy   Does the patient have one of the following disease processes/diagnoses(primary or secondary)?  General Surgery   Does the patient have Home health ordered?  No   Is there a DME ordered?  No   Prep survey completed?  Yes          Christianne Victoria RN

## 2019-05-20 NOTE — PAYOR COMM NOTE
"Valentina Guerrero RN CM  Phone [phone]  Fax [phone]      Joshua Nielson (44 y.o. Male)     Date of Birth Social Security Number Address Home Phone MRN    1975  [address]  [address]  [facility] [address] [phone] [id number]    Sabianist Marital Status          None        Admission Date Admission Type Admitting Provider Attending Provider Department, Room/Bed    5/13/19 Elective Marco A Dumont MD  [facility] 5H, S574/1    Discharge Date Discharge Disposition Discharge Destination        5/17/2019 Home or Self Care              Attending Provider:  (none)   Allergies:  Nsaids    Isolation:  None   Infection:  None   Code Status:  Prior    Ht:  175.3 cm (69\")   Wt:  90.9 kg (200 lb 6.4 oz)    Admission Cmt:  None   Principal Problem:  None                Active Insurance as of 5/13/2019     Primary Coverage     Payor Plan Insurance Group Employer/Plan Group    ANTHEM MEDICAID ANTHEM MEDICAID [id number]     Payor Plan Address Payor Plan Phone Number Payor Plan Fax Number Effective Dates    [address] [address] [phone]  10/1/2016 - None Entered    [facility] [id number]       Subscriber Name Subscriber Birth Date Member ID       JOSHUA NIELSON 1975 [id number]                 Emergency Contacts      (Rel.) Home Phone Work Phone Mobile Phone    Sana Nielson (Mother) -- -- [phone]               History & Physical      Marco A Dumont MD at 5/13/2019  9:15 AM          [facility] Pre-op    Full history and physical note from office is up to date.  See office note attached.    /87 (BP Location: Right arm, Patient Position: Lying)   Pulse 75   Temp 97.8 °F (36.6 °C) (Temporal)   Resp 18   Ht 175.3 cm (69\")   Wt 90.9 kg (200 lb 6.4 oz)   SpO2 97%   BMI 29.59 kg/m²      LAB Results:  Lab Results   Component Value Date    WBC 7.67 05/09/2019    HGB 15.2 05/09/2019    HCT 43.4 05/09/2019    " MCV 94.6 05/09/2019     05/09/2019    NEUTROABS 4.40 04/01/2019    GLUCOSE 322 (H) 05/09/2019    BUN 27 (H) 05/09/2019    CREATININE 1.03 05/09/2019    EGFRIFNONA 78 05/09/2019     05/09/2019    K 5.0 05/09/2019     05/09/2019    CO2 23.0 05/09/2019    MG 1.6 03/20/2019    CALCIUM 9.5 05/09/2019    ALBUMIN 4.48 04/01/2019    AST 28 04/01/2019    ALT 64 (H) 04/01/2019    BILITOT 0.3 04/01/2019       Cancer Staging (if applicable)  Cancer Patient: __ yes _x_no __unknown__N/A; If yes, clinical stage T:__ N:__M:__, stage group or __N/A    Riana Lopez, APRN 5/13/2019 9:15 AM       I have reviewed the above note, my prior note(s), appropriate imaging, and labs.  I have again discussed the risks and benefits of laparoscopic sigmoid colectomy possible open with the patient.  All of their questions have been answered.  They understand and wish to proceed with surgical intervention.    CBC  Results from last 7 days   Lab Units 05/09/19  0846   WBC 10*3/mm3 7.67   HEMOGLOBIN g/dL 15.2   HEMATOCRIT % 43.4   PLATELETS 10*3/mm3 193       CMP  Results from last 7 days   Lab Units 05/13/19  0939 05/09/19  0846   SODIUM mmol/L  --  138   POTASSIUM mmol/L 4.3 5.0   CHLORIDE mmol/L  --  104   CO2 mmol/L  --  23.0   BUN mg/dL  --  27*   CREATININE mg/dL  --  1.03   CALCIUM mg/dL  --  9.5   GLUCOSE mg/dL  --  322*                   Electronically signed by Marco A Dumont MD at 5/13/2019 10:02 AM   Source Note         Scan on 5/13/2019: PANCHO KENNEDY 4/16/2019 (below)            Electronically signed by Interface, Scans Incoming at 5/10/2019 11:19 AM             H&P filed by [employer], [place] at 5/10/2019  7:27 PM     Scan on 5/13/2019: PANCHO KENNEDY 04/16/2019 (below)            Electronically signed by Interface, Scans Incoming at 5/10/2019  7:27 PM     H&P filed by [employer], [place] at 5/10/2019 11:19 AM     Scan on 5/13/2019: PANCHO SURGEONS 4/16/2019 (below)             Electronically signed by Interface, Scans Incoming at 5/10/2019 11:19 AM          Operative/Procedure Notes (all)      Marco A Dumont MD at 5/13/2019  3:01 PM  Version 1 of 1       General Surgery Operative Note    Joshua Power  [id number]  1975    Date of Surgery:  5/13/2019 3:01 PM    Pre-op Diagnosis: Diverticulosis with a history of perforated diverticulitis    Post-op Diagnosis: Diverticulosis with a history of perforated diverticulitis    Procedure: Laparoscopic sigmoid colectomy                       Laparoscopic lysis of adhesions (45 minutes)                       Laparoscopic mobilization of the splenic flexure    Surgeon: Marco A Dumont MD    Assistant: SHRUTI Mackay    Anesthesia: General with Block    Fluids: 2 L crystalloid    Estimated Blood Loss: Less than 25 mL    Urine Voided: Greater than 250 mL    Specimens: Sigmoid colon     Drains: 19 Bel[place]an Lazarus drain, pelvis    Findings: Diverticular disease around the sigmoid colon with adhesions to the small bowel    Extenuating circumstances: The complexity of the case was increased secondary to the patient's significant intra-abdominal adhesions from his prior abscess secondary to his perforated diverticular disease.  Proximally 45 minutes of the case were devoted to lysis of adhesions.    Complications: None apparent    History:   44-year-old gentleman with diabetes mellitus presented with perforated diverticular disease and its associated abscess earlier this year.  His abscess resolved with conservative management with IV antibiotics.  He presents today for elective partial colectomy.     The risks and benefits of laparoscopic sigmoid colectomy with mobilization of the splenic flexure were rehashed.  Our discussion included but was not limited to: bleeding, infection, injury to adjacent viscera (spleen, stomach, small bowel, etc.), anastomotic leak, postoperative abscess formation, need for re-intervention, stoma  creation, an open operation in general, and medical issues from a cardiopulmonary and deep venous thrombosis standpoint.  All questions were answered and they understand and wish to proceed with surgical intervention.    Procedure:      After informed consent, the patient was taken to the operating room and placed in the lithotomy position.  General anesthesia was induced, bilateral TAP blocks were placed by anesthesia, a Puente catheter was placed, the abdomen/perineum were then prepped and draped in the standard sterile fashion. A timeout was observed.     I began with an infraumbilical Lemus trocar cutdown.  The peritoneum was entered sharply, bilateral Vicryl fascial sutures placed, the blunt Lemus trocar was placed intra-abdominally, and the abdomen was insufflated.  He clearly had adhesions to the anterior abdominal wall and small bowel.  At this point I placed a left and right-sided mid abdomen 5 mm trochars under direct visualization without obvious complication.  Using the laparoscopic LigaSure the adhesions to the anterior abdominal wall, small bowel, and pelvic sidewall were all freed.  The lysis of adhesions took approximately 45 minutes and increase the complexity of the case.  Once I was able to free up the sigmoid colon from the surrounding tissue I incised the white line of Toldt following this up the descending colon freeing up the splenic flexure to adequate laxity for our anastomosis.  The colon was then reflected laterally and I incised the mesocolon at the level of the sacral promontory allowing for a bloodless dissection.  I placed a 12 mm trocar just off the right anterior superior iliac spine under direct visualization.  A Penrose drain was then placed around the colorectal junction.  Using the laparoscopic LigaSure I freed this down up to the rectum circumferentially.  The right and left ureter were identified and kept free from our operative field.  I then dissected proximally along the  mesocolon dissecting the inferior mesenteric artery circumferentially.  Again I have visualized the left ureter.  A 6 the vascular load was taken across the inferior mesenteric artery.  There was meticulous hemostasis.  I then took the remaining portion of the mesentery proximally and distally with the laparoscopic LigaSure.  Using blue loads and the 60 DANYELL stapler I took this across the proximal rectum.  At this point the colon was brought out the periumbilical incision after enlarging the fascial incision to allow this to be moved.  A wound protector was placed.  The colon was transected again with a blue load.  This was passed off the specimen.  The colon aperture was measured to take a 28 circular stapler.  A pursestring suture was placed around the proximal colon after sharply removing the staple line.  Meticulous hemostasis was obtained.  The circular stapler was then placed up the patient's anus and rectum and the stapler fired.  There were 2 good distal donuts.  The anastomosis appeared intact with rigid sigmoidoscopy.  A 19 Palauan Lazarus drain was then placed into the pelvis and brought out the right-sided 5 mm port.  The 12 elevator fascial defect was closed with 0 Vicryl.  All trochars were removed under direct visualization.  The infraumbilical fascial defect was closed with interrupted 0 Vicryl.  This was irrigated with bacitracin irrigation.  The skin was then reapproximated with a 4-0 subcuticular Monocryl.  All trocar sites were closed with a running 4-0 subcuticular Monocryl Mastisol, Steri-Strips, Telfa and Tegaderm were placed on the wounds the 19 Palauan Lazarus drain was sewn in with a 2-0 nylon and placed to bulb suction.  And abdominal binder was placed on the patient.     Sterile dressings were placed on the wounds.  All lap and needle counts were correct at the end of the procedure ×2.  The patient was then transferred to the PACU.    Marco A Dumont MD     Date: 5/13/2019  Time: 3:01  "PM    Electronically signed by Marco A Dumont MD at 5/13/2019  3:17 PM          Physician Progress Notes (all)      Marco A Dumont MD at 5/16/2019  1:15 PM          General Surgery Post op Follow Up Note    Subjective:   Feeling better.  No GI function yet.    /83 (BP Location: Left arm, Patient Position: Lying)   Pulse 89   Temp 97.7 °F (36.5 °C) (Oral)   Resp 18   Ht 175.3 cm (69\")   Wt 90.9 kg (200 lb 6.4 oz)   SpO2 90%   BMI 29.59 kg/m²      General Appearance:  in no acute distress  Abdomen: incision is clean and dry, JUDE benign.    CBC  Results from last 7 days   Lab Units 05/16/19  0452   WBC 10*3/mm3 8.82   HEMOGLOBIN g/dL 13.2   HEMATOCRIT % 38.7   PLATELETS 10*3/mm3 138*       CMP/BMP  Results from last 7 days   Lab Units 05/16/19  0452   SODIUM mmol/L 138   POTASSIUM mmol/L 3.6   CHLORIDE mmol/L 96*   CO2 mmol/L 28.0   BUN mg/dL 10   CREATININE mg/dL 0.78   CALCIUM mg/dL 9.2   BILIRUBIN mg/dL 0.7   ALK PHOS U/L 65   ALT (SGPT) U/L 25   AST (SGOT) U/L 16   GLUCOSE mg/dL 133*         ASSESSMENT/PLAN:    Full liquids.  Mobilize.  Mestinon times one.    Marco A Dumont MD  5/16/2019  1:16 PM    Electronically signed by Marco A Dumont MD at 5/16/2019  1:17 PM     Marco A Dumont MD at 5/15/2019 12:17 PM          General Surgery Post op Follow Up Note    Subjective:   Feels okay. No GI function yet.    /79 (BP Location: Left leg, Patient Position: Lying)   Pulse 97   Temp 98.3 °F (36.8 °C) (Axillary)   Resp 16   Ht 175.3 cm (69\")   Wt 90.9 kg (200 lb 6.4 oz)   SpO2 90%   BMI 29.59 kg/m²      General Appearance:  in no acute distress  Abdomen: incisions are fine, JUDE benign    CBC  Results from last 7 days   Lab Units 05/14/19  0633   WBC 10*3/mm3 14.96*   HEMOGLOBIN g/dL 13.9   HEMATOCRIT % 40.2   PLATELETS 10*3/mm3 172       CMP/BMP  Results from last 7 days   Lab Units 05/14/19  0633   SODIUM mmol/L 137   POTASSIUM mmol/L 4.7   CHLORIDE mmol/L " "101   CO2 mmol/L 27.0   BUN mg/dL 19   CREATININE mg/dL 1.02   CALCIUM mg/dL 8.6   GLUCOSE mg/dL 152*         ASSESSMENT/PLAN:    Full liquids.  Mobilize.  Supportive care.    Marco A Dumont MD  5/15/2019  12:17 PM    Electronically signed by Marco A Dumont MD at 5/15/2019 12:18 PM     Marco A Dumont MD at 5/14/2019  1:21 PM          General Surgery Post op Follow Up Note    Subjective:   Pain controlled. Doing okay    /80 (BP Location: Left arm, Patient Position: Lying)   Pulse 85   Temp 98.8 °F (37.1 °C)   Resp 18   Ht 175.3 cm (69\")   Wt 90.9 kg (200 lb 6.4 oz)   SpO2 95%   BMI 29.59 kg/m²      General Appearance:  in no acute distress  Abdomen: incision is clean and dry, JUDE benign    CBC  Results from last 7 days   Lab Units 05/14/19  0633   WBC 10*3/mm3 14.96*   HEMOGLOBIN g/dL 13.9   HEMATOCRIT % 40.2   PLATELETS 10*3/mm3 172       CMP/BMP  Results from last 7 days   Lab Units 05/14/19  0633   SODIUM mmol/L 137   POTASSIUM mmol/L 4.7   CHLORIDE mmol/L 101   CO2 mmol/L 27.0   BUN mg/dL 19   CREATININE mg/dL 1.02   CALCIUM mg/dL 8.6   GLUCOSE mg/dL 152*         ASSESSMENT/PLAN:    Clear liquids for today.  Mobilize.  Remove call catheter.    Marco A Dumont MD  5/14/2019  1:21 PM    Electronically signed by Marco A Dumont MD at 5/14/2019  1:22 PM     Juli Murillo MD at 5/13/2019  6:08 PM        Night of Surgery Note    S:  No complaints.    /99 (BP Location: Left arm, Patient Position: Lying)   Pulse 81   Temp 98.2 °F (36.8 °C) (Oral)   Resp 18   Ht 175.3 cm (69\")   Wt 90.9 kg (200 lb 6.4 oz)   SpO2 94%   BMI 29.59 kg/m²      Physical Exam:  General: NAD, AAO  Abdomen: Soft, NT, mild distention.  Incisions: c/d/i.  JUDE with serosanguinous drainage.    A/P:  NOS for 44 year old male s/p laparoscopic sigmoid colectomy for diverticular disease.  Doing well.  - Continue current management      Juli Murillo MD  05/13/19  6:08 " PM      Electronically signed by Juli Murillo MD at 5/13/2019  6:09 PM       Consult Notes (all)     No notes of this type exist for this encounter.           Discharge Summary      Marco A Dumont MD at 5/17/2019 11:37 AM          General Surgery Discharge Note (Dr. HELLEN Dumont)    Sachin Strickland MD    Patient Name:  Joshua Power  Admission Date:  5/13/2019  Discharge Date:  5/17/2019    Diagnosis/Problems:  Problem List Items Addressed This Visit     None      Visit Diagnoses     Sigmoid diverticulitis        Relevant Orders    Tissue Pathology Exam (Completed)          History & Hospital Course: 44-year-old gentleman presented for elective laparoscopic sigmoid colectomy.  Earlier this year he had perforated sigmoid diverticular disease which was managed medically.  He improved nicely.  On 5/13/2019 he underwent a uneventful laparoscopic sigmoid colectomy.  Postoperatively he has done quite well.  His JUDE drain has been removed.  The patient is tolerating an appropriate diet, having GI function, and is ready to be discharged.  Pathology benign diverticular disease.    Temp:  [98.1 °F (36.7 °C)-98.3 °F (36.8 °C)] 98.3 °F (36.8 °C)  Heart Rate:  [84-99] 99  Resp:  [18] 18  BP: (118-132)/(80-81) 118/80    Physical: appropriate post operative examination.      Assessment:  Doing well may DC home.    Plan:    DC Home  Diet: Regular GI soft diet as tolerated  Restrictions: No heavy lifting greater than 10 lbs.  May shower as needed, no tub bathes.  Do not submerge the incisions underwater.    Prescriptions: Percocet 7.5 mg tablets #15, no refills  May resume all prior home medications.  No current facility-administered medications on file prior to encounter.      Current Outpatient Medications on File Prior to Encounter   Medication Sig Dispense Refill   • clonazePAM (KlonoPIN) 0.5 MG tablet Take 0.5 mg by mouth. TAKE 0.5MG IN AM, TAKE 1MG DAILY AT 4PM      • divalproex (DEPAKOTE) 500 MG   tablet Take 1,000 mg by mouth 2 (Two) Times a Day.     • DULoxetine (CYMBALTA) 60 MG capsule Take 60 mg by mouth 2 (Two) Times a Day.     • ferrous gluconate 324 (37.5 Fe) MG tablet tablet Take 1 mg by mouth Daily With Breakfast.     • insulin lispro (humaLOG) 100 UNIT/ML injection Inject 30 Units under the skin into the appropriate area as directed 2 (Two) Times a Day.     • propranolol (INDERAL) 10 MG tablet Take 10 mg by mouth Daily.     • traZODone (DESYREL) 150 MG tablet Take 150 mg by mouth Every Night.          Follow up in 2 weeks at [facility], [phone].    Marco A Dumont MD,  5/17/2019 - 11:38 AM    Electronically signed by Marco A Dumont MD at 5/17/2019 11:40 AM

## 2019-05-21 ENCOUNTER — READMISSION MANAGEMENT (OUTPATIENT)
Dept: CALL CENTER | Facility: HOSPITAL | Age: 44
End: 2019-05-21

## 2019-05-21 NOTE — OUTREACH NOTE
General Surgery Week 1 Survey      Responses   Facility patient discharged from?  [place]   Does the patient have one of the following disease processes/diagnoses(primary or secondary)?  General Surgery   Is there a successful TCM telephone encounter documented?  No   Week 1 attempt successful?  Yes   Call start time  1215   Call end time  1226   Discharge diagnosis                                                       elective laparoscopic sigmoid colectomy   Is patient permission given to speak with other caregiver?  Yes   List who call center can speak with  mother   Meds reviewed with patient/caregiver?  Yes   Is the patient having any side effects they believe may be caused by any medication additions or changes?  No   Does the patient have all medications related to this admission filled (includes all antibiotics, pain medications, etc.)  Yes   Is the patient taking all medications as directed (includes completed medication regime)?  Yes   Medication comments  Pain at worst has been 8/10 with meds 2-3/10   Does the patient have a follow up appointment scheduled with their surgeon?  Yes   Has the patient kept scheduled appointments due by today?  N/A   What is the Home health agency?   .   DME comments  Has a CPAP but does not use it as it does not fit properly per pt.    Psychosocial issues?  No   Comments  4 lap sites are without s/sx of inf. Staying hydrated but appetite is decreased.    Did the patient receive a copy of their discharge instructions?  Yes   Nursing interventions  Reviewed instructions with patient   What is the patient's perception of their health status since discharge?  Improving   Nursing interventions  Nurse provided patient education   Is the patient /caregiver able to teach back basic post-op care?  Continue use of incentive spirometry at least 1 week post discharge, Take showers only when approved by MD-sponge bathe until then, No tub bath, swimming, or hot tub until instructed by  MD, Do not remove steri-strips   Is the patient/caregiver able to teach back signs and symptoms of incisional infection?  Incisional warmth, Pus or odor from incision   Is the patient/caregiver able to teach back steps to recovery at home?  Rest and rebuild strength, gradually increase activity, Eat a well-balance diet   Is the patient/caregiver able to teach back the hierarchy of who to call/visit for symptoms/problems? PCP, Specialist, Home health nurse, Urgent Care, ED, 911  Yes   Week 1 call completed?  Yes          Simi Amato, RN

## 2019-05-30 ENCOUNTER — READMISSION MANAGEMENT (OUTPATIENT)
Dept: CALL CENTER | Facility: HOSPITAL | Age: 44
End: 2019-05-30

## 2019-05-30 NOTE — OUTREACH NOTE
General Surgery Week 2 Survey      Responses   Facility patient discharged from?  [facility]   Does the patient have one of the following disease processes/diagnoses(primary or secondary)?  General Surgery   Week 2 attempt successful?  Yes   Call start time  1429   Call end time  1433   Discharge diagnosis                                                       elective laparoscopic sigmoid colectomy   Meds reviewed with patient/caregiver?  Yes   Is the patient taking all medications as directed (includes completed medication regime)?  Yes   Medication comments  finished narcotics   Has the patient kept scheduled appointments due by today?  N/A   Comments  Going to MD on Tuesday   Has home health visited the patient within 72 hours of discharge?  N/A   Psychosocial issues?  No   What is the patient's perception of their health status since discharge?  Improving   Nursing interventions  Nurse provided patient education   Is the patient /caregiver able to teach back basic post-op care?  Take showers only when approved by MD-sponge bathe until then, No tub bath, swimming, or hot tub until instructed by MD, Keep incision areas clean,dry and protected, Lifting as instructed by MD in discharge instructions   Is the patient/caregiver able to teach back signs and symptoms of incisional infection?  Increased redness, swelling or pain at the incisonal site, Increased drainage or bleeding, Incisional warmth, Pus or odor from incision, Fever   Is the patient/caregiver able to teach back steps to recovery at home?  Set small, achievable goals for return to baseline health, Eat a well-balance diet   Is the patient/caregiver able to teach back the hierarchy of who to call/visit for symptoms/problems? PCP, Specialist, Home health nurse, Urgent Care, ED, 911  Yes   Additional teach back comments  pt seems to be doing better each day. Appetite is progressing slowly. Enc good protein in smaller meals and good po fluid intake.   Week 2  call completed?  Yes          Brigida Dunn RN

## 2019-12-24 ENCOUNTER — CONSULT (OUTPATIENT)
Dept: SLEEP MEDICINE | Facility: HOSPITAL | Age: 44
End: 2019-12-24

## 2019-12-24 VITALS
DIASTOLIC BLOOD PRESSURE: 89 MMHG | RESPIRATION RATE: 16 BRPM | OXYGEN SATURATION: 94 % | HEIGHT: 69 IN | HEART RATE: 84 BPM | WEIGHT: 209 LBS | BODY MASS INDEX: 30.96 KG/M2 | SYSTOLIC BLOOD PRESSURE: 125 MMHG

## 2019-12-24 DIAGNOSIS — E66.09 CLASS 1 OBESITY DUE TO EXCESS CALORIES WITH SERIOUS COMORBIDITY AND BODY MASS INDEX (BMI) OF 30.0 TO 30.9 IN ADULT: ICD-10-CM

## 2019-12-24 DIAGNOSIS — G47.33 OSA (OBSTRUCTIVE SLEEP APNEA): Primary | ICD-10-CM

## 2019-12-24 PROCEDURE — 99204 OFFICE O/P NEW MOD 45 MIN: CPT | Performed by: INTERNAL MEDICINE

## 2019-12-25 NOTE — PROGRESS NOTES
Subjective   Joshua Power is a 44 y.o. male is being seen for consultation today at the request of Dr. Sachin Strickland for the evaluation of snoring and obstructive sleep apnea.    History of Present Illness  Patient states he has had sleeping problems for at least 5 years.  He says he awakens exhausted in the morning.  He is tired even after sleeping 8 hours.  He reportedly had a sleep study at [facility] 3 years ago and was told that he needed to wear a CPAP mask but did not get that.  He says he is feeling worse these days.  His weight is up about 35 pounds.  He had a partial colectomy earlier this year for diverticulitis.  He says he will fall asleep if sitting quietly during the day.  He naps daily for about 2 hours.  He is sleepy even if he increases his time in bed.    He has a history of loud snoring and snores in all positions.  He does awaken with a dry mouth.  He has been told that he stops breathing at night.  He denies having any trouble breathing through his nose both day and night denies ever breaking his nose.  He says he is not rested in the morning.  He has a morning headache 3 to 4 days/week.  He denies any reflux symptoms.  He denies hypnagogue hallucinations sleep paralysis.  He has occasional kicking of his legs at night but denies it keeps him awake.  he also has some chronic back pain but does not think it keeps him awake.    He goes to bed about 10 PM.  He will sleep in 15 minutes.  He awakens 4-5 times during the night.  He thinks he gets about 8 hours of sleep but still feels tired.  He says he had some weekends he slept as much as 20 hours without feeling refreshed.  He had hypertension known for 3 to 4 years.  He has had diabetes known for 14 years.  He denies any history of varices.  He has a history of arthritis.  Allergies   Allergen Reactions   • Nsaids GI Bleeding     Gi Bleed           Current Outpatient Medications:   •  clonazePAM (KlonoPIN) 0.5 MG tablet, Take 0.5 mg by  mouth. TAKE 0.5MG IN AM, TAKE 1MG DAILY AT 4PM , Disp: , Rfl:   •  divalproex (DEPAKOTE) 500 MG DR tablet, Take 1,000 mg by mouth 2 (Two) Times a Day., Disp: , Rfl:   •  DULoxetine (CYMBALTA) 60 MG capsule, Take 60 mg by mouth 2 (Two) Times a Day., Disp: , Rfl:   •  Eszopiclone (LUNESTA PO), Take  by mouth., Disp: , Rfl:   •  ferrous gluconate 324 (37.5 Fe) MG tablet tablet, Take 1 mg by mouth Daily With Breakfast., Disp: , Rfl:   •  gabapentin (NEURONTIN) 400 MG capsule, Take 400 mg by mouth 3 (Three) Times a Day., Disp: , Rfl:   •  insulin lispro (humaLOG) 100 UNIT/ML injection, Inject 30 Units under the skin into the appropriate area as directed 2 (Two) Times a Day., Disp: , Rfl:   •  propranolol (INDERAL) 10 MG tablet, Take 10 mg by mouth Daily., Disp: , Rfl:   •  traZODone (DESYREL) 150 MG tablet, Take 50 mg by mouth Every Night., Disp: , Rfl:     Social History    Tobacco Use      Smoking status: Never Smoker      Smokeless tobacco: Never Used       Social History     Substance and Sexual Activity   Alcohol Use No       Caffeine: He avoids caffeine    Past Medical History:   Diagnosis Date   • Anemia    • Anxiety and depression    • Arthritis    • Diabetes mellitus (CMS/HCC)    • DISH (diffuse idiopathic skeletal hyperostosis)    • Diverticulitis    • Headache    • History of GI bleed    • History of hepatitis B    • History of transfusion     no reaction . [facility] 2017   • Hyperlipidemia    • Hypertension    • Injury of back    • CONSTANTIN (obstructive sleep apnea)     trying to adjust tro c-=pap  not currently using        Past Surgical History:   Procedure Laterality Date   • COLON RESECTION Left 5/13/2019    Procedure: LAPAROSCOPIC SIGMOID COLECTOMY LEFT;  Surgeon: Marco A Dumont MD;  Location: [facility];  Service: General   • COLONOSCOPY     • ENDOSCOPY     • FRACTURE SURGERY Left     hand        Family History   Problem Relation Age of Onset   • Diabetes Father    • Stroke Father    • Kidney failure  "Father    Family history is also positive for hypertension, heart disease, and cancer.    The following portions of the patient's history were reviewed and updated as appropriate: allergies, current medications, past family history, past medical history, past social history, past surgical history and problem list.    Review of Systems   Constitutional: Positive for fatigue and unexpected weight change.   Eyes: Negative.    Respiratory: Negative.    Cardiovascular: Negative.    Gastrointestinal: Negative.    Endocrine: Positive for polydipsia.   Genitourinary: Negative.    Musculoskeletal: Positive for arthralgias, back pain, joint swelling and myalgias.   Skin: Negative.    Allergic/Immunologic: Positive for environmental allergies.   Neurological: Positive for dizziness, light-headedness, numbness and headaches.   Hematological: Negative.    Psychiatric/Behavioral: The patient is nervous/anxious.    Columbia score 0/24    Objective     /89   Pulse 84   Resp 16   Ht 175.3 cm (69\")   Wt 94.8 kg (209 lb)   SpO2 94%   BMI 30.86 kg/m²      Physical Exam   Constitutional: He is oriented to person, place, and time. He appears well-developed and well-nourished.   He is obese.   HENT:   Head: Normocephalic and atraumatic.   He has nasal airway narrowing and Mallampati class III anatomy.  He has mild retrognathia.   Eyes: Pupils are equal, round, and reactive to light. EOM are normal.   Neck: Normal range of motion. Neck supple.   Cardiovascular: Normal rate, regular rhythm and normal heart sounds.   Pulmonary/Chest: Effort normal and breath sounds normal.   Abdominal: Soft. Bowel sounds are normal.   Musculoskeletal: Normal range of motion. He exhibits no edema.   Neurological: He is alert and oriented to person, place, and time.   Skin: Skin is warm and dry.   Psychiatric: He has a normal mood and affect. His behavior is normal.         Assessment/Plan   Joshua was seen today for sleeping problem.    Diagnoses and " all orders for this visit:    CONSTANTIN (obstructive sleep apnea)  -     Detailed AutoPAP Order    Class 1 obesity due to excess calories with serious comorbidity and body mass index (BMI) of 30.0 to 30.9 in adult    Patient has a history of snoring and nonrestorative sleep.  He was previously diagnosed with obstructive sleep apnea at [facility] sleep lab.  He apparently did not get a CPAP machine.  He is gained weight.  I suspect he still has significant obstructive sleep apnea.  We will get a copy of his previous study and will order him a CPAP machine.  We have also just other possible therapies including weight control, oral appliance, and surgery.  He is encouraged to lose weight.  He is encouraged to avoid alcohol and sedatives close to bedtime.  He is encouraged to practice lateral position sleep.  We have discussed the long-term consequences of untreated obstructive sleep apnea.  We will plan for him to return in 2 months and we can download his machine and make certain it is controlling his respiratory events.         Sachin Bianchi MD [facility]  Sleep Medicine  Pulmonary and Critical Care Medicine

## 2021-03-30 ENCOUNTER — IMMUNIZATION (OUTPATIENT)
Dept: VACCINE CLINIC | Facility: HOSPITAL | Age: 46
End: 2021-03-30

## 2021-03-30 PROCEDURE — 0001A: CPT | Performed by: INTERNAL MEDICINE

## 2021-03-30 PROCEDURE — 91300 HC SARSCOV02 VAC 30MCG/0.3ML IM: CPT | Performed by: INTERNAL MEDICINE

## 2021-04-20 ENCOUNTER — APPOINTMENT (OUTPATIENT)
Dept: VACCINE CLINIC | Facility: HOSPITAL | Age: 46
End: 2021-04-20

## 2021-04-20 ENCOUNTER — IMMUNIZATION (OUTPATIENT)
Dept: VACCINE CLINIC | Facility: HOSPITAL | Age: 46
End: 2021-04-20

## 2021-04-20 PROCEDURE — 91300 HC SARSCOV02 VAC 30MCG/0.3ML IM: CPT | Performed by: INTERNAL MEDICINE

## 2021-04-20 PROCEDURE — 0002A: CPT | Performed by: INTERNAL MEDICINE

## 2022-03-01 ENCOUNTER — OFFICE VISIT (OUTPATIENT)
Dept: ENDOCRINOLOGY | Facility: CLINIC | Age: 47
End: 2022-03-01

## 2022-03-01 VITALS
WEIGHT: 207 LBS | HEART RATE: 103 BPM | DIASTOLIC BLOOD PRESSURE: 90 MMHG | OXYGEN SATURATION: 100 % | SYSTOLIC BLOOD PRESSURE: 130 MMHG | HEIGHT: 70 IN | BODY MASS INDEX: 29.63 KG/M2

## 2022-03-01 DIAGNOSIS — Z79.4 TYPE 2 DIABETES MELLITUS WITH HYPERGLYCEMIA, WITH LONG-TERM CURRENT USE OF INSULIN: Primary | ICD-10-CM

## 2022-03-01 DIAGNOSIS — E11.65 TYPE 2 DIABETES MELLITUS WITH HYPERGLYCEMIA, WITH LONG-TERM CURRENT USE OF INSULIN: Primary | ICD-10-CM

## 2022-03-01 PROCEDURE — 99214 OFFICE O/P EST MOD 30 MIN: CPT | Performed by: INTERNAL MEDICINE

## 2022-03-01 RX ORDER — ATORVASTATIN CALCIUM 20 MG/1
20 TABLET, FILM COATED ORAL DAILY
Qty: 90 TABLET | Refills: 3 | Status: SHIPPED | OUTPATIENT
Start: 2022-03-01 | End: 2023-03-01

## 2022-03-01 RX ORDER — DAPAGLIFLOZIN 5 MG/1
5 TABLET, FILM COATED ORAL DAILY
Qty: 90 TABLET | Refills: 3 | Status: SHIPPED | OUTPATIENT
Start: 2022-03-01

## 2022-03-01 RX ORDER — LEVOTHYROXINE SODIUM 0.05 MG/1
50 TABLET ORAL DAILY
COMMUNITY

## 2022-03-01 RX ORDER — GABAPENTIN 800 MG/1
800 TABLET ORAL 4 TIMES DAILY
COMMUNITY
Start: 2022-02-09

## 2022-03-01 RX ORDER — OXYCODONE HYDROCHLORIDE 15 MG/1
15 TABLET, FILM COATED, EXTENDED RELEASE ORAL AS NEEDED
COMMUNITY

## 2022-03-01 RX ORDER — SEMAGLUTIDE 1.34 MG/ML
1 INJECTION, SOLUTION SUBCUTANEOUS WEEKLY
COMMUNITY
Start: 2022-02-11

## 2022-03-01 RX ORDER — TRAZODONE HYDROCHLORIDE 50 MG/1
50 TABLET ORAL NIGHTLY
COMMUNITY

## 2022-03-01 NOTE — ASSESSMENT & PLAN NOTE
Uncontrolled as evidenced by high a1c. Medication options, dietary and exercise guidelines were reviewed. We will add sglt2  Will add a statin. He used to be on one and is willing to try again

## 2022-03-01 NOTE — PROGRESS NOTES
Office Note      Date: 2022  Patient Name: Joshua Power  MRN: [id number]  : 1975    Chief Complaint   Patient presents with   • Diabetes       History of Present Illness:   Joshua Power is a 47 y.o. male who presents for Diabetes -type 2  He is seen as a new patient.  ---------------------  He was diagnosed at age 35 and reports he was started on metformin, januvia and insulin at the start.   He is currently on twice daily insulin and ozempic but prior to that  He had been on insulin and trulicity and his a1c was about 8.5  He has not used sglt2 inhibitor.  He is not sure why he is no longer on metformin. He does not recall why metformin was discontinued.  ---------------------  bg checks are done daily  --------------------  Hypoglycemia is happening 3-4 times per day   ----------------------  He has a aleyda to       Last A1c:  Hemoglobin A1C   Date Value Ref Range Status   2019 7.60 (H) 4.80 - 5.60 % Final       Subjective      Diabetic Complications:  Eyes: No- eye exam was a month ago   Kidneys: No  Feet: Yes, describe: symptoms   Heart: No  He is not on meds for bp or cholesterol    ldl was 160     Diet and Exercise:  Meals per day: 2  Minutes of exercise per week: 90 mins.    Review of Systems:   Review of Systems   Constitutional: Positive for activity change, appetite change, diaphoresis and fatigue. Negative for unexpected weight change.   Respiratory: Positive for shortness of breath.    Endocrine: Positive for polydipsia.   Musculoskeletal: Positive for arthralgias, back pain, joint swelling, myalgias and neck pain.   Neurological: Positive for dizziness, light-headedness and numbness.   Psychiatric/Behavioral: Positive for sleep disturbance.       The following portions of the patient's history were reviewed and updated as appropriate: allergies, current medications, past family history, past medical history, past social history, past surgical history and  "problem list.    Objective     Visit Vitals  /90   Pulse 103   Ht 177.8 cm (70\")   Wt 93.9 kg (207 lb)   SpO2 100%   BMI 29.70 kg/m²       Labs:    CMP  Lab Results   Component Value Date    GLUCOSE 133 (H) 05/16/2019    BUN 10 05/16/2019    CREATININE 0.78 05/16/2019    EGFRIFNONA 108 05/16/2019    BCR 12.8 05/16/2019    K 3.6 05/16/2019    CO2 28.0 05/16/2019    CALCIUM 9.2 05/16/2019    AST 16 05/16/2019    ALT 25 05/16/2019        CBC w/DIFF  Lab Results   Component Value Date    WBC 8.82 05/16/2019    RBC 4.02 (L) 05/16/2019    HGB 13.2 05/16/2019    HCT 38.7 05/16/2019    MCV 96.3 05/16/2019    MCH 32.8 05/16/2019    MCHC 34.1 05/16/2019    RDW 12.7 05/16/2019    RDWSD 44.8 05/16/2019    MPV 10.6 05/16/2019     (L) 05/16/2019    NEUTRORELPCT 71.8 05/14/2019    LYMPHORELPCT 18.2 (L) 05/14/2019    MONORELPCT 9.8 05/14/2019    EOSRELPCT 0.1 (L) 05/14/2019    BASORELPCT 0.1 05/14/2019    AUTOIGPER 0.3 05/14/2019    NEUTROABS 10.76 (H) 05/14/2019    LYMPHSABS 2.72 05/14/2019    MONOSABS 1.46 (H) 05/14/2019    EOSABS 0.01 05/14/2019    BASOSABS 0.01 05/14/2019    AUTOIGNUM 0.05 05/14/2019       Physical Exam:  Physical Exam  Vitals reviewed.   Constitutional:       Appearance: Normal appearance.   HENT:      Head: Normocephalic and atraumatic.   Eyes:      Extraocular Movements: Extraocular movements intact.   Neck:      Comments: No goiter  Cardiovascular:      Rate and Rhythm: Normal rate and regular rhythm.      Pulses:           Dorsalis pedis pulses are 1+ on the right side and 1+ on the left side.        Posterior tibial pulses are 1+ on the right side and 1+ on the left side.   Pulmonary:      Effort: Pulmonary effort is normal. No respiratory distress.   Feet:      Right foot:      Protective Sensation: 10 sites tested. 6 sites sensed.      Toenail Condition: Right toenails are long.      Left foot:      Protective Sensation: 10 sites tested. 6 sites sensed.      Toenail Condition: Left toenails " are long.      Comments: Diabetic Foot Exam Performed  Lymphadenopathy:      Cervical: No cervical adenopathy.   Neurological:      Mental Status: He is alert.   Psychiatric:         Mood and Affect: Mood normal.         Thought Content: Thought content normal.         Judgment: Judgment normal.         Assessment / Plan      Assessment & Plan:  Problem List Items Addressed This Visit        Other    Type 2 diabetes mellitus with hyperglycemia, with long-term current use of insulin (HCC) - Primary    Current Assessment & Plan     Uncontrolled as evidenced by high a1c. Medication options, dietary and exercise guidelines were reviewed. We will add sglt2  Will add a statin. He used to be on one and is willing to try again          Relevant Medications    Ozempic, 1 MG/DOSE, 4 MG/3ML solution pen-injector    Insulin Lispro Prot & Lispro (HUMALOG MIX 75/25 KWIKPEN SC)    dapagliflozin (Farxiga) 5 MG tablet tablet           Jack Perera MD   03/01/2022

## (undated) DEVICE — DRSNG TELFA PAD NONADH STR 1S 3X4IN

## (undated) DEVICE — DRAPE,UNDERBUTTOCKS,STERILE: Brand: MEDLINE

## (undated) DEVICE — ENCORE® LATEX MICRO SIZE 8, STERILE LATEX POWDER-FREE SURGICAL GLOVE: Brand: ENCORE

## (undated) DEVICE — ENDOCUT SCISSOR TIP, DISPOSABLE: Brand: RENEW

## (undated) DEVICE — ENDOPATH XCEL BLADELESS TROCARS WITH STABILITY SLEEVES: Brand: ENDOPATH XCEL

## (undated) DEVICE — TOTAL TRAY, 16FR 10ML SIL FOLEY, URN: Brand: MEDLINE

## (undated) DEVICE — 3M™ STERI-STRIP™ REINFORCED ADHESIVE SKIN CLOSURES, R1547, 1/2 IN X 4 IN (12 MM X 100 MM), 6 STRIPS/ENVELOPE: Brand: 3M™ STERI-STRIP™

## (undated) DEVICE — DRN PENRS 1/2X18IN LTX

## (undated) DEVICE — FLTR PLUMEPORT LAP W/CONN STRL

## (undated) DEVICE — ADHS LIQ MASTISOL 2/3ML

## (undated) DEVICE — TRY SKINPREP DRYPREP

## (undated) DEVICE — ECHELON FLEX POWERED PLUS LONG ARTICULATING ENDOSCOPIC LINEAR CUTTER, 60MM: Brand: ECHELON FLEX

## (undated) DEVICE — SUT VIC PLS CTD ANTIB 2/0 18IN VCP111G

## (undated) DEVICE — DRSNG SURESITE WNDW 2.38X2.75

## (undated) DEVICE — TOWEL,OR,DSP,ST,BLUE,STD,4/PK,20PK/CS: Brand: MEDLINE

## (undated) DEVICE — SAFESECURE,SECUREMENT,FOLEY CATH,STERILE: Brand: MEDLINE

## (undated) DEVICE — COVER,LIGHT HANDLE,FLX,1/PK: Brand: MEDLINE INDUSTRIES, INC.

## (undated) DEVICE — APPL CHLORAPREP W/TINT 26ML BLU

## (undated) DEVICE — TUBING, SUCTION, 1/4" X 10', STRAIGHT: Brand: MEDLINE

## (undated) DEVICE — WOUND RETRACTOR AND PROTECTOR: Brand: ALEXIS WOUND PROTECTOR-RETRACTOR

## (undated) DEVICE — [HIGH FLOW HEATED INSUFFLATOR TUBING,  DO NOT USE IF PACKAGE IS DAMAGED]

## (undated) DEVICE — ENDOPATH XCEL BLUNT TIP TROCARS WITH SMOOTH SLEEVES: Brand: ENDOPATH XCEL

## (undated) DEVICE — JELLY,LUBE,STERILE,FLIP TOP,TUBE,2-OZ: Brand: MEDLINE

## (undated) DEVICE — SPNG LAP PREWSH SFTPK 18X18IN STRL PK/5

## (undated) DEVICE — ANTIBACTERIAL UNDYED BRAIDED (POLYGLACTIN 910), SYNTHETIC ABSORBABLE SUTURE: Brand: COATED VICRYL

## (undated) DEVICE — JACKSON-PRATT 100CC BULB RESERVOIR: Brand: CARDINAL HEALTH

## (undated) DEVICE — PDS II VLT 0 107CM AG ST3: Brand: ENDOLOOP

## (undated) DEVICE — 3M™ STERI-DRAPE™ INSTRUMENT POUCH 1018: Brand: STERI-DRAPE™

## (undated) DEVICE — DRAPE, LAVH, STERILE: Brand: MEDLINE

## (undated) DEVICE — MEDI-VAC YANKAUER SUCTION HANDLE: Brand: CARDINAL HEALTH

## (undated) DEVICE — PENCL E/S HNDSWCH ROCKRBTN HOLSTR 10FT

## (undated) DEVICE — INTENDED FOR TISSUE SEPARATION, AND OTHER PROCEDURES THAT REQUIRE A SHARP SURGICAL BLADE TO PUNCTURE OR CUT.: Brand: BARD-PARKER ® STAINLESS STEEL BLADES

## (undated) DEVICE — LEX BASIC NO DRAPE: Brand: MEDLINE INDUSTRIES, INC.

## (undated) DEVICE — HARMONIC ACE +7 LAPAROSCOPIC SHEARS ADVANCED HEMOSTASIS 5MM DIAMETER 36CM SHAFT LENGTH  FOR USE WITH GRAY HAND PIECE ONLY: Brand: HARMONIC ACE